# Patient Record
Sex: FEMALE | Race: WHITE | NOT HISPANIC OR LATINO | Employment: FULL TIME | URBAN - METROPOLITAN AREA
[De-identification: names, ages, dates, MRNs, and addresses within clinical notes are randomized per-mention and may not be internally consistent; named-entity substitution may affect disease eponyms.]

---

## 2018-05-16 ENCOUNTER — RECORDS - HEALTHEAST (OUTPATIENT)
Dept: LAB | Facility: CLINIC | Age: 28
End: 2018-05-16

## 2018-05-17 LAB — BACTERIA SPEC CULT: NO GROWTH

## 2019-03-26 ENCOUNTER — RECORDS - HEALTHEAST (OUTPATIENT)
Dept: ADMINISTRATIVE | Facility: OTHER | Age: 29
End: 2019-03-26

## 2019-06-25 ENCOUNTER — RECORDS - HEALTHEAST (OUTPATIENT)
Dept: LAB | Facility: CLINIC | Age: 29
End: 2019-06-25

## 2019-06-25 LAB
HCG SERPL QL: NEGATIVE
TSH SERPL DL<=0.005 MIU/L-ACNC: 1.8 UIU/ML (ref 0.3–5)

## 2019-09-30 ENCOUNTER — RECORDS - HEALTHEAST (OUTPATIENT)
Dept: LAB | Facility: CLINIC | Age: 29
End: 2019-09-30

## 2019-09-30 LAB
FSH SERPL-ACNC: 5.4 MIU/ML
HCG SERPL QL: NEGATIVE
LH SERPL-ACNC: 14.5 MIU/ML

## 2019-10-02 LAB — TESTOST SERPL-MCNC: 50 NG/DL (ref 14–53)

## 2021-05-29 ENCOUNTER — RECORDS - HEALTHEAST (OUTPATIENT)
Dept: ADMINISTRATIVE | Facility: CLINIC | Age: 31
End: 2021-05-29

## 2021-06-01 ENCOUNTER — RECORDS - HEALTHEAST (OUTPATIENT)
Dept: ADMINISTRATIVE | Facility: CLINIC | Age: 31
End: 2021-06-01

## 2021-06-12 ENCOUNTER — RECORDS - HEALTHEAST (OUTPATIENT)
Dept: LAB | Facility: CLINIC | Age: 31
End: 2021-06-12

## 2021-06-13 LAB — BACTERIA SPEC CULT: NORMAL

## 2021-07-15 ENCOUNTER — TRANSFERRED RECORDS (OUTPATIENT)
Dept: HEALTH INFORMATION MANAGEMENT | Facility: CLINIC | Age: 31
End: 2021-07-15

## 2021-08-11 ENCOUNTER — LAB REQUISITION (OUTPATIENT)
Dept: LAB | Facility: CLINIC | Age: 31
End: 2021-08-11

## 2021-08-11 DIAGNOSIS — R35.0 FREQUENCY OF MICTURITION: ICD-10-CM

## 2021-08-11 PROCEDURE — 87086 URINE CULTURE/COLONY COUNT: CPT | Performed by: FAMILY MEDICINE

## 2021-08-13 LAB — BACTERIA UR CULT: NO GROWTH

## 2021-08-23 ENCOUNTER — ANESTHESIA EVENT (OUTPATIENT)
Dept: OBGYN | Facility: CLINIC | Age: 31
End: 2021-08-23
Payer: COMMERCIAL

## 2021-08-23 ENCOUNTER — ANESTHESIA (OUTPATIENT)
Dept: OBGYN | Facility: CLINIC | Age: 31
End: 2021-08-23
Payer: COMMERCIAL

## 2021-08-23 ENCOUNTER — TRANSFERRED RECORDS (OUTPATIENT)
Dept: HEALTH INFORMATION MANAGEMENT | Facility: CLINIC | Age: 31
End: 2021-08-23

## 2021-08-23 ENCOUNTER — HOSPITAL ENCOUNTER (OUTPATIENT)
Facility: CLINIC | Age: 31
Discharge: HOME | End: 2021-08-24
Attending: OBSTETRICS & GYNECOLOGY | Admitting: OBSTETRICS & GYNECOLOGY
Payer: COMMERCIAL

## 2021-08-23 PROBLEM — Z36.89 ENCOUNTER FOR TRIAGE IN PREGNANT PATIENT: Status: ACTIVE | Noted: 2021-08-23

## 2021-08-23 LAB
ABO/RH(D): NORMAL
ALBUMIN UR-MCNC: NEGATIVE MG/DL
ANTIBODY SCREEN: NEGATIVE
APPEARANCE UR: CLEAR
BASOPHILS # BLD AUTO: 0 10E3/UL (ref 0–0.2)
BASOPHILS NFR BLD AUTO: 0 %
BILIRUB UR QL STRIP: NEGATIVE
CLUE CELLS: ABNORMAL
COLOR UR AUTO: ABNORMAL
EOSINOPHIL # BLD AUTO: 0.2 10E3/UL (ref 0–0.7)
EOSINOPHIL NFR BLD AUTO: 2 %
ERYTHROCYTE [DISTWIDTH] IN BLOOD BY AUTOMATED COUNT: 12.5 % (ref 10–15)
GLUCOSE UR STRIP-MCNC: NEGATIVE MG/DL
HCT VFR BLD AUTO: 35.3 % (ref 35–47)
HGB BLD-MCNC: 12.1 G/DL (ref 11.7–15.7)
HGB UR QL STRIP: NEGATIVE
IMM GRANULOCYTES # BLD: 0.1 10E3/UL
IMM GRANULOCYTES NFR BLD: 1 %
KETONES UR STRIP-MCNC: 10 MG/DL
LEUKOCYTE ESTERASE UR QL STRIP: NEGATIVE
LYMPHOCYTES # BLD AUTO: 2.3 10E3/UL (ref 0.8–5.3)
LYMPHOCYTES NFR BLD AUTO: 19 %
MCH RBC QN AUTO: 30.7 PG (ref 26.5–33)
MCHC RBC AUTO-ENTMCNC: 34.3 G/DL (ref 31.5–36.5)
MCV RBC AUTO: 90 FL (ref 78–100)
MONOCYTES # BLD AUTO: 0.8 10E3/UL (ref 0–1.3)
MONOCYTES NFR BLD AUTO: 7 %
NEUTROPHILS # BLD AUTO: 8.8 10E3/UL (ref 1.6–8.3)
NEUTROPHILS NFR BLD AUTO: 71 %
NITRATE UR QL: NEGATIVE
NRBC # BLD AUTO: 0 10E3/UL
NRBC BLD AUTO-RTO: 0 /100
PH UR STRIP: 6.5 [PH] (ref 5–7)
PLATELET # BLD AUTO: 203 10E3/UL (ref 150–450)
RBC # BLD AUTO: 3.94 10E6/UL (ref 3.8–5.2)
SARS-COV-2 RNA RESP QL NAA+PROBE: NEGATIVE
SP GR UR STRIP: 1.02 (ref 1–1.03)
SPECIMEN EXPIRATION DATE: NORMAL
TRICHOMONAS, WET PREP: ABNORMAL
UROBILINOGEN UR STRIP-MCNC: NORMAL MG/DL
WBC # BLD AUTO: 12.3 10E3/UL (ref 4–11)
WBC'S/HIGH POWER FIELD, WET PREP: ABNORMAL
YEAST, WET PREP: ABNORMAL

## 2021-08-23 PROCEDURE — 370N000017 HC ANESTHESIA TECHNICAL FEE, PER MIN: Performed by: OBSTETRICS & GYNECOLOGY

## 2021-08-23 PROCEDURE — 250N000011 HC RX IP 250 OP 636: Performed by: ANESTHESIOLOGY

## 2021-08-23 PROCEDURE — 710N000010 HC RECOVERY PHASE 1, LEVEL 2, PER MIN: Performed by: OBSTETRICS & GYNECOLOGY

## 2021-08-23 PROCEDURE — 250N000011 HC RX IP 250 OP 636: Performed by: NURSE ANESTHETIST, CERTIFIED REGISTERED

## 2021-08-23 PROCEDURE — 87491 CHLMYD TRACH DNA AMP PROBE: CPT | Performed by: STUDENT IN AN ORGANIZED HEALTH CARE EDUCATION/TRAINING PROGRAM

## 2021-08-23 PROCEDURE — 999N000010 HC STATISTIC ANES STAT CODE-CRNA PER MINUTE: Performed by: OBSTETRICS & GYNECOLOGY

## 2021-08-23 PROCEDURE — 85025 COMPLETE CBC W/AUTO DIFF WBC: CPT | Performed by: STUDENT IN AN ORGANIZED HEALTH CARE EDUCATION/TRAINING PROGRAM

## 2021-08-23 PROCEDURE — 36415 COLL VENOUS BLD VENIPUNCTURE: CPT | Performed by: STUDENT IN AN ORGANIZED HEALTH CARE EDUCATION/TRAINING PROGRAM

## 2021-08-23 PROCEDURE — 86900 BLOOD TYPING SEROLOGIC ABO: CPT | Performed by: STUDENT IN AN ORGANIZED HEALTH CARE EDUCATION/TRAINING PROGRAM

## 2021-08-23 PROCEDURE — 360N000074 HC SURGERY LEVEL 1, PER MIN: Performed by: OBSTETRICS & GYNECOLOGY

## 2021-08-23 PROCEDURE — 87591 N.GONORRHOEAE DNA AMP PROB: CPT | Performed by: STUDENT IN AN ORGANIZED HEALTH CARE EDUCATION/TRAINING PROGRAM

## 2021-08-23 PROCEDURE — 87210 SMEAR WET MOUNT SALINE/INK: CPT | Performed by: STUDENT IN AN ORGANIZED HEALTH CARE EDUCATION/TRAINING PROGRAM

## 2021-08-23 PROCEDURE — 250N000011 HC RX IP 250 OP 636: Performed by: STUDENT IN AN ORGANIZED HEALTH CARE EDUCATION/TRAINING PROGRAM

## 2021-08-23 PROCEDURE — 250N000013 HC RX MED GY IP 250 OP 250 PS 637: Performed by: STUDENT IN AN ORGANIZED HEALTH CARE EDUCATION/TRAINING PROGRAM

## 2021-08-23 PROCEDURE — 258N000003 HC RX IP 258 OP 636: Performed by: NURSE ANESTHETIST, CERTIFIED REGISTERED

## 2021-08-23 PROCEDURE — 81003 URINALYSIS AUTO W/O SCOPE: CPT | Performed by: STUDENT IN AN ORGANIZED HEALTH CARE EDUCATION/TRAINING PROGRAM

## 2021-08-23 PROCEDURE — 258N000003 HC RX IP 258 OP 636: Performed by: OBSTETRICS & GYNECOLOGY

## 2021-08-23 PROCEDURE — 99205 OFFICE O/P NEW HI 60 MIN: CPT | Mod: 25 | Performed by: OBSTETRICS & GYNECOLOGY

## 2021-08-23 PROCEDURE — 59320 REVISION OF CERVIX: CPT | Mod: GC | Performed by: STUDENT IN AN ORGANIZED HEALTH CARE EDUCATION/TRAINING PROGRAM

## 2021-08-23 PROCEDURE — 87635 SARS-COV-2 COVID-19 AMP PRB: CPT | Performed by: STUDENT IN AN ORGANIZED HEALTH CARE EDUCATION/TRAINING PROGRAM

## 2021-08-23 PROCEDURE — 272N000001 HC OR GENERAL SUPPLY STERILE: Performed by: OBSTETRICS & GYNECOLOGY

## 2021-08-23 PROCEDURE — 999N000141 HC STATISTIC PRE-PROCEDURE NURSING ASSESSMENT: Performed by: OBSTETRICS & GYNECOLOGY

## 2021-08-23 RX ORDER — ONDANSETRON 4 MG/1
4 TABLET, ORALLY DISINTEGRATING ORAL EVERY 30 MIN PRN
Status: CANCELLED | OUTPATIENT
Start: 2021-08-23

## 2021-08-23 RX ORDER — CLINDAMYCIN PHOSPHATE 600 MG/50ML
600 INJECTION, SOLUTION INTRAVENOUS EVERY 8 HOURS
Status: DISCONTINUED | OUTPATIENT
Start: 2021-08-23 | End: 2021-08-23 | Stop reason: HOSPADM

## 2021-08-23 RX ORDER — SERTRALINE HYDROCHLORIDE 25 MG/1
50 TABLET, FILM COATED ORAL DAILY
COMMUNITY
End: 2021-11-11

## 2021-08-23 RX ORDER — HYDROMORPHONE HCL IN WATER/PF 6 MG/30 ML
0.2 PATIENT CONTROLLED ANALGESIA SYRINGE INTRAVENOUS EVERY 5 MIN PRN
Status: CANCELLED | OUTPATIENT
Start: 2021-08-23

## 2021-08-23 RX ORDER — ONDANSETRON 4 MG/1
4 TABLET, ORALLY DISINTEGRATING ORAL
Status: DISCONTINUED | OUTPATIENT
Start: 2021-08-23 | End: 2021-08-24 | Stop reason: HOSPADM

## 2021-08-23 RX ORDER — PRENATAL VIT/IRON FUM/FOLIC AC 27MG-0.8MG
1 TABLET ORAL DAILY
COMMUNITY
End: 2022-01-07

## 2021-08-23 RX ORDER — LIDOCAINE 40 MG/G
CREAM TOPICAL
Status: DISCONTINUED | OUTPATIENT
Start: 2021-08-23 | End: 2021-08-24 | Stop reason: HOSPADM

## 2021-08-23 RX ORDER — SODIUM CHLORIDE, SODIUM LACTATE, POTASSIUM CHLORIDE, CALCIUM CHLORIDE 600; 310; 30; 20 MG/100ML; MG/100ML; MG/100ML; MG/100ML
INJECTION, SOLUTION INTRAVENOUS CONTINUOUS
Status: CANCELLED | OUTPATIENT
Start: 2021-08-23

## 2021-08-23 RX ORDER — FENTANYL CITRATE 50 UG/ML
25 INJECTION, SOLUTION INTRAMUSCULAR; INTRAVENOUS EVERY 5 MIN PRN
Status: CANCELLED | OUTPATIENT
Start: 2021-08-23

## 2021-08-23 RX ORDER — BUPIVACAINE HYDROCHLORIDE 7.5 MG/ML
INJECTION, SOLUTION INTRASPINAL
Status: DISCONTINUED | OUTPATIENT
Start: 2021-08-23 | End: 2021-08-23

## 2021-08-23 RX ORDER — SODIUM CHLORIDE, SODIUM LACTATE, POTASSIUM CHLORIDE, CALCIUM CHLORIDE 600; 310; 30; 20 MG/100ML; MG/100ML; MG/100ML; MG/100ML
INJECTION, SOLUTION INTRAVENOUS CONTINUOUS
Status: DISCONTINUED | OUTPATIENT
Start: 2021-08-23 | End: 2021-08-24 | Stop reason: HOSPADM

## 2021-08-23 RX ORDER — INDOMETHACIN 50 MG/1
50 CAPSULE ORAL EVERY 8 HOURS
Status: COMPLETED | OUTPATIENT
Start: 2021-08-23 | End: 2021-08-24

## 2021-08-23 RX ORDER — ACETAMINOPHEN 325 MG/1
650 TABLET ORAL
Status: DISCONTINUED | OUTPATIENT
Start: 2021-08-23 | End: 2021-08-24

## 2021-08-23 RX ORDER — DOCUSATE SODIUM 100 MG/1
100 CAPSULE, LIQUID FILLED ORAL 2 TIMES DAILY
Status: DISCONTINUED | OUTPATIENT
Start: 2021-08-23 | End: 2021-08-24 | Stop reason: HOSPADM

## 2021-08-23 RX ORDER — SODIUM CHLORIDE, SODIUM LACTATE, POTASSIUM CHLORIDE, CALCIUM CHLORIDE 600; 310; 30; 20 MG/100ML; MG/100ML; MG/100ML; MG/100ML
INJECTION, SOLUTION INTRAVENOUS CONTINUOUS PRN
Status: DISCONTINUED | OUTPATIENT
Start: 2021-08-23 | End: 2021-08-23

## 2021-08-23 RX ORDER — POLYETHYLENE GLYCOL 3350 17 G/17G
17 POWDER, FOR SOLUTION ORAL DAILY
Status: DISCONTINUED | OUTPATIENT
Start: 2021-08-24 | End: 2021-08-24 | Stop reason: HOSPADM

## 2021-08-23 RX ORDER — OXYCODONE HYDROCHLORIDE 5 MG/1
5 TABLET ORAL EVERY 4 HOURS PRN
Status: CANCELLED | OUTPATIENT
Start: 2021-08-23

## 2021-08-23 RX ORDER — ONDANSETRON 2 MG/ML
4 INJECTION INTRAMUSCULAR; INTRAVENOUS EVERY 30 MIN PRN
Status: CANCELLED | OUTPATIENT
Start: 2021-08-23

## 2021-08-23 RX ORDER — FAMOTIDINE 20 MG/1
20 TABLET, FILM COATED ORAL 2 TIMES DAILY
Status: DISCONTINUED | OUTPATIENT
Start: 2021-08-23 | End: 2021-08-24 | Stop reason: HOSPADM

## 2021-08-23 RX ADMIN — PHENYLEPHRINE HYDROCHLORIDE 100 MCG: 10 INJECTION INTRAVENOUS at 21:18

## 2021-08-23 RX ADMIN — CLINDAMYCIN IN 5 PERCENT DEXTROSE 600 MG: 12 INJECTION, SOLUTION INTRAVENOUS at 21:10

## 2021-08-23 RX ADMIN — INDOMETHACIN 50 MG: 25 CAPSULE ORAL at 22:19

## 2021-08-23 RX ADMIN — SERTRALINE HYDROCHLORIDE 50 MG: 50 TABLET ORAL at 22:54

## 2021-08-23 RX ADMIN — SODIUM CHLORIDE, POTASSIUM CHLORIDE, SODIUM LACTATE AND CALCIUM CHLORIDE: 600; 310; 30; 20 INJECTION, SOLUTION INTRAVENOUS at 23:44

## 2021-08-23 RX ADMIN — SODIUM CHLORIDE, POTASSIUM CHLORIDE, SODIUM LACTATE AND CALCIUM CHLORIDE: 600; 310; 30; 20 INJECTION, SOLUTION INTRAVENOUS at 20:57

## 2021-08-23 RX ADMIN — BUPIVACAINE HYDROCHLORIDE IN DEXTROSE 1.4 ML: 7.5 INJECTION, SOLUTION SUBARACHNOID at 21:04

## 2021-08-23 RX ADMIN — ACETAMINOPHEN 650 MG: 325 TABLET, FILM COATED ORAL at 22:53

## 2021-08-23 RX ADMIN — DOCUSATE SODIUM 100 MG: 100 CAPSULE, LIQUID FILLED ORAL at 22:54

## 2021-08-23 RX ADMIN — PHENYLEPHRINE HYDROCHLORIDE 100 MCG: 10 INJECTION INTRAVENOUS at 21:07

## 2021-08-23 RX ADMIN — FAMOTIDINE 20 MG: 20 TABLET ORAL at 22:19

## 2021-08-23 RX ADMIN — SODIUM CHLORIDE, POTASSIUM CHLORIDE, SODIUM LACTATE AND CALCIUM CHLORIDE: 600; 310; 30; 20 INJECTION, SOLUTION INTRAVENOUS at 20:36

## 2021-08-23 ASSESSMENT — MIFFLIN-ST. JEOR: SCORE: 1663.47

## 2021-08-23 NOTE — H&P
History and Physical     Yue Teran MRN# [id number]   YOB: 1990 Age: 30 year old      Date of Admission: 2021    Primary care provider: Braydon Ramos      Assessment and Plan:       30 year old  at 20w4d by 10w6d US with a history of MDD/PRAMOD here with cervical insufficiency.   Pregnancy has otherwise been complicated by UTI at 9w gestation.      # Cervical insufficiency  - Plan to proceed with exam indicated cerclage today  - Continuous tocometry   - Plan post-op antibiotics and tocolysis x 24 hours.     # FWB:   - Continuous toco - no evidence of contractions  - TID Dopp tones post-op      Patient discussed with Miladys Contreras MD.     Casandra Maddox MD  Obstetrics & Gynecology, PGY-3  2021 6:28 PM      Physician Attestation   I, Miladys Contreras MD, saw this patient with the resident and agree with the resident/fellow's findings and plan of care as documented in the note.      I personally reviewed vital signs, medications, labs, imaging and EFM.    Key findings: In summary, Yue Teran's overall clinical picture is concerning for cervical insufficiency. ?We did also review the other differential diagnoses, including possibility of previable labor/miscarriage, possibility of intrauterine infection, or that the cervical shortening is a risk factor for  delivery, that may not be ameliorated with cerclage placement. ?We had a long discussion regarding the risks, benefits and alternatives of continued expectant management vs proceeding with rescue cerclage placement. Yue opted to proceed with cerclage placement today. ?Informed consent was obtained and will plan to proceed with exam indicated cerclage today.     Miladys Contreras MD  Date of Service (when I saw the patient): 21    Time Spent on this Encounter   I spent 65 minutes on the unit/floor managing the care of Yue Teran. Over 50% of my time was spent on the following:   - Counseling the patient  and/or family regarding: diagnostic results, prognosis, risks and benefits of treatment options and medical compliance  - Coordination of care with the: nurse and patient    In summary, Yue Teran's overall clinical picture is concerning for cervical insufficiency. ?We did also review the other differential diagnoses, including possibility of previable labor/miscarriage, possibility of intrauterine infection, or that the cervical shortening is a risk factor for  delivery, that may not be ameliorated with cerclage placement. ?We had a long discussion regarding the risks, benefits and alternatives of continued expectant management vs proceeding with rescue cerclage placement. Yue opted to proceed with cerclage placement today. ?Informed consent was obtained and will plan to proceed with exam indicated cerclage today.     Miladys Contreras MD            HPI:     Yue Teran is a 30 year old  at 20w4d who presents today from her routine anatomy scan for new finding of cervical dilation. Yue states her pregnancy has been thus far uncomplicated.  However, at her US today, cervical funneling was noted and on exam, Yue was found to be 1-2 cm dilated, prompting her to be transferred here for further assessment.      Denies LOF, vaginal bleeding, or contractions.  Has noted increased discharge over the past week.  She denies fever, chills, SOB, chest pain, palpitations, N/V, LE swelling/tenderness.  No concerns for headache, vision changes, RUQ or epigastric pain        OB History:    OB History    Para Term  AB Living   1 0 0 0 0 0   SAB TAB Ectopic Multiple Live Births   0 0 0 0 0      # Outcome Date GA Lbr Andres/2nd Weight Sex Delivery Anes PTL Lv   1 Current                 Prenatal Lab Results:  No results found for: ABO, RH, AS, HEPBANG, CHPCRT, GCPCRT, TREPAB, RPR, RUBELLAABIGG, HGB, HIV    GBS Status:   No results found for: GBS             Past Medical History:   No past  "medical history on file.          Past Surgical History:   No past surgical history on file.          Social History:     Social History     Tobacco Use     Smoking status: Never Smoker     Smokeless tobacco: Never Used   Substance Use Topics     Alcohol use: Never             Family History:   No family history on file.          Immunizations:     There is no immunization history on file for this patient.         Allergies:     Allergies   Allergen Reactions     Egg [Egg Shells] Unknown     Amoxicillin Rash     Cefzil [Cefprozil] Rash             Medications:     Medications Prior to Admission   Medication Sig Dispense Refill Last Dose     Prenatal Vit-Fe Fumarate-FA (PRENATAL MULTIVITAMIN W/IRON) 27-0.8 MG tablet Take 1 tablet by mouth daily   8/22/2021 at 2100     sertraline (ZOLOFT) 25 MG tablet Take 25 mg by mouth daily   8/22/2021 at 2100             Review of Systems & Physical Exam:     The Review of Systems is negative other than noted in the HPI      /72   Pulse 70   Temp 98.6  F (37  C) (Oral)   Resp 18   Ht 1.702 m (5' 7\")   Wt 90.1 kg (198 lb 9.6 oz)   BMI 31.11 kg/m    Gen: Pt AAOx3, NAD  CV: RRR, nl S1/S2, no murmurs/clicks/gallops  Lungs: CTAB, non-labored breathing  Abd: Gravid, non-tender, non-distended  Ext: No peripheral extremity edema  SSE: Cx 1 cm dilated, membranes visible at external os, cervix ~1 cm in length.     Monitoring External  FHT: present  TOCO 0 contractions in 10 minutes         Data:     Component      Latest Ref Rng & Units 8/23/2021   WBC      4.0 - 11.0 10e3/uL 12.3 (H)   RBC Count      3.80 - 5.20 10e6/uL 3.94   Hemoglobin      11.7 - 15.7 g/dL 12.1   Hematocrit      35.0 - 47.0 % 35.3   MCV      78 - 100 fL 90   MCH      26.5 - 33.0 pg 30.7   MCHC      31.5 - 36.5 g/dL 34.3   RDW      10.0 - 15.0 % 12.5   Platelet Count      150 - 450 10e3/uL 203   % Neutrophils      % 71   % Lymphocytes      % 19   % Monocytes      % 7   % Eosinophils      % 2   % Basophils     "  % 0   % Immature Granulocytes      % 1   NRBCs per 100 WBC      <1 /100 0   Absolute Neutrophils      1.6 - 8.3 10e3/uL 8.8 (H)   Absolute Lymphocytes      0.8 - 5.3 10e3/uL 2.3   Absolute Monocytes      0.0 - 1.3 10e3/uL 0.8   Absolute Eosinophils      0.0 - 0.7 10e3/uL 0.2   Absolute Basophils      0.0 - 0.2 10e3/uL 0.0   Absolute Immature Granulocytes      <=0.0 10e3/uL 0.1 (H)   Absolute NRBCs      10e3/uL 0.0   Trichomonas      Absent Absent   Yeast      Absent Absent   Clue cells      Absent Absent   WBCs/high power field      None 1+ (A)   ABO/Rh(D)       B POS   Antibody Screen      Negative Negative   SPECIMEN EXPIRATION DATE       [id number]   SARS CoV2 PCR      Negative Negative

## 2021-08-24 ENCOUNTER — HOSPITAL ENCOUNTER (OUTPATIENT)
Facility: CLINIC | Age: 31
End: 2021-08-24
Admitting: OBSTETRICS & GYNECOLOGY
Payer: COMMERCIAL

## 2021-08-24 ENCOUNTER — APPOINTMENT (OUTPATIENT)
Dept: ULTRASOUND IMAGING | Facility: CLINIC | Age: 31
End: 2021-08-24
Payer: COMMERCIAL

## 2021-08-24 VITALS
RESPIRATION RATE: 18 BRPM | DIASTOLIC BLOOD PRESSURE: 65 MMHG | TEMPERATURE: 97.6 F | BODY MASS INDEX: 31.17 KG/M2 | WEIGHT: 198.6 LBS | OXYGEN SATURATION: 100 % | HEIGHT: 67 IN | SYSTOLIC BLOOD PRESSURE: 109 MMHG | HEART RATE: 56 BPM

## 2021-08-24 DIAGNOSIS — O34.32 CERVICAL INSUFFICIENCY DURING PREGNANCY IN SECOND TRIMESTER, ANTEPARTUM: Primary | ICD-10-CM

## 2021-08-24 LAB
C TRACH DNA SPEC QL NAA+PROBE: NEGATIVE
HOLD SPECIMEN: NORMAL
HOLD SPECIMEN: NORMAL
N GONORRHOEA DNA SPEC QL NAA+PROBE: NEGATIVE

## 2021-08-24 PROCEDURE — 99214 OFFICE O/P EST MOD 30 MIN: CPT | Mod: 25 | Performed by: OBSTETRICS & GYNECOLOGY

## 2021-08-24 PROCEDURE — 250N000011 HC RX IP 250 OP 636: Performed by: STUDENT IN AN ORGANIZED HEALTH CARE EDUCATION/TRAINING PROGRAM

## 2021-08-24 PROCEDURE — 76815 OB US LIMITED FETUS(S): CPT | Mod: 26 | Performed by: OBSTETRICS & GYNECOLOGY

## 2021-08-24 PROCEDURE — 250N000013 HC RX MED GY IP 250 OP 250 PS 637: Performed by: STUDENT IN AN ORGANIZED HEALTH CARE EDUCATION/TRAINING PROGRAM

## 2021-08-24 PROCEDURE — 76817 TRANSVAGINAL US OBSTETRIC: CPT | Mod: 26 | Performed by: OBSTETRICS & GYNECOLOGY

## 2021-08-24 PROCEDURE — 76815 OB US LIMITED FETUS(S): CPT

## 2021-08-24 RX ORDER — ONDANSETRON 2 MG/ML
4 INJECTION INTRAMUSCULAR; INTRAVENOUS EVERY 6 HOURS PRN
Status: DISCONTINUED | OUTPATIENT
Start: 2021-08-24 | End: 2021-08-24 | Stop reason: HOSPADM

## 2021-08-24 RX ORDER — ACETAMINOPHEN 325 MG/1
650 TABLET ORAL EVERY 6 HOURS PRN
Status: DISCONTINUED | OUTPATIENT
Start: 2021-08-24 | End: 2021-08-24 | Stop reason: HOSPADM

## 2021-08-24 RX ORDER — CLINDAMYCIN PHOSPHATE 600 MG/50ML
600 INJECTION, SOLUTION INTRAVENOUS EVERY 8 HOURS
Status: COMPLETED | OUTPATIENT
Start: 2021-08-24 | End: 2021-08-24

## 2021-08-24 RX ADMIN — INDOMETHACIN 50 MG: 25 CAPSULE ORAL at 14:04

## 2021-08-24 RX ADMIN — CLINDAMYCIN PHOSPHATE 600 MG: 600 INJECTION, SOLUTION INTRAVENOUS at 04:48

## 2021-08-24 RX ADMIN — CLINDAMYCIN PHOSPHATE 600 MG: 600 INJECTION, SOLUTION INTRAVENOUS at 13:18

## 2021-08-24 RX ADMIN — ONDANSETRON 4 MG: 2 INJECTION INTRAMUSCULAR; INTRAVENOUS at 01:31

## 2021-08-24 RX ADMIN — ACETAMINOPHEN 650 MG: 325 TABLET, FILM COATED ORAL at 04:47

## 2021-08-24 RX ADMIN — DOCUSATE SODIUM 100 MG: 100 CAPSULE, LIQUID FILLED ORAL at 07:49

## 2021-08-24 RX ADMIN — FAMOTIDINE 20 MG: 20 TABLET ORAL at 07:48

## 2021-08-24 RX ADMIN — INDOMETHACIN 50 MG: 25 CAPSULE ORAL at 06:01

## 2021-08-24 NOTE — PLAN OF CARE
[facility] for a cerclage evaluation. No contractions noted on toco, patient denies cramping or uterine tenderness. VSS. Patient states that she has had increased discharge in the last week but denies any other symptoms. Dr. Contreras at bedside now for evaluation. Report given to KLEBER RN who assumes care of this patient at this time.

## 2021-08-24 NOTE — OP NOTE
Operative Note: Cervical Cerclage         Pre-Op Diagnosis:   1) Single intrauterine pregnancy at 20w4d by 10w6d US  2) Cervical insufficiency by physical exam indication         Post-Op Diagnosis:   1) Same         Procedure:   1) Olivas cervical cerclage, two sutures (knots at 12 and 1 o'clock positions)         Surgeons:   Attending: Miladys Contreras MD  Fellow: Ubaldo Mcgowan MD, [facility] Fellow  Resident: Casandra Maddox MD PGY3         Anesthesia:   Spinal          Estimated Blood Loss:   10 cc         Findings:   1) Cervix dilated 4 cm with exposed membranes and fetal parts visualized through amniotic sac prior to the procedure. Cervix closed following the procedure  2) Fetal heart tones confirmed by bedside ultrasound following the procedure         Specimens:   1) None         Complications:   1) None apparent          History:     Yue Teran is a 30 year old  at 20w4d by 10w6d US.  She presented for her comprehensive anatomy ultrasound and was incidentally found to have cervical funneling. She underwent speculum examination, which revealed that the cervix was dilated 1-2 cm. Given this and the concern for cervical insufficiency, she presented to L&D for evaluation for cervical cerclage. She did not display any evidence of  labor or intra-amniotic infection on presentation.  After counseling regarding these findings, the patient has opted to proceed with physical exam indicated cervical cerclage.         Details of Procedure:     After administration of spinal anesthesia the patient was placed in the dorsal lithotomy position and prepped and draped in the usual fashion. A surgical time-out was performed. The bladder was straight catheterized.  A weighted speculum was placed into the vagina and zoë retractors were used to visualize the cervix. The cervix was visually dilated 4 cm and the amnionitic sac was visualized at the level of the external os. Fetal parts were visualized through the  amniotic sac. The vagina and cervix were copiously cleansed with betadine solution. Next, a circumferential stay suture was placed at the external cervix with a #2 Ethilon. Next, a circumferential suture of #2 Ethilon was placed in the usual fashion at the cervicovaginal reflection with knot tied at 1 o'clock position after ensuring that all fetal parts had been displaced from the cervix as evidenced by palpation.  A second suture of #2 Ethilon was placed approximately 0.5 cm proximal to the first stitch and tied at the 12 o'clock position.  Both sutures were securely tied down and digital exam confirmed that the cervix was closed.  The stay suture was then removed, the cervix was examined and found to be tightly closed.     The bladder was drained of clear urine and a rectal exam confirmed that no sutures were present in the rectum.  The cervix and vaginal vault were inspected and noted to be free of injury and hemostatic.      The instruments were removed from the vagina. She tolerated her spinal anesthesia well without incident. She was subsequently transferred to the recovery room in satisfactory condition.     Sponge and needle counts were correct at the close of the case x 2.    Ubaldo Mcgowan MD  Maternal-Fetal Medicine Fellow    Physician Attestation   I was present for the entire procedure of exam indicated cerclage placement.    Miladys Contreras  Date of Service (when I saw the patient): 8/23/21

## 2021-08-24 NOTE — PROGRESS NOTES
VSS. Tolerating PO, voiding adequate amounts without difficulty. Reports occasional cramping, declines need for tylenol. OK for discharge. Discharge instructions given, patient verbalizes understanding. Discharge ambulatory.

## 2021-08-24 NOTE — PLAN OF CARE
VSS. Mild abdominal cramping and arching relieved with tylenol and heat pack. IA baseline 145, no ctx on toco since 2300. Adequate urine output. Ambulating independently. Tolerating fluids. Nausea and vomiting x 1, relieved with Zofran. Scant bleeding overnight. Will continue to monitor closely.

## 2021-08-24 NOTE — PLAN OF CARE
MD deems patient a candidate for cerclage placement after speculum exam was performed.  Swabs sent.  Consents signed and patient prepped for procedure.

## 2021-08-24 NOTE — ANESTHESIA POSTPROCEDURE EVALUATION
Patient: Yue Teran    Procedure(s):  CERCLAGE, CERVIX, VAGINAL APPROACH    Diagnosis:* No pre-op diagnosis entered *  Diagnosis Additional Information: No value filed.    Anesthesia Type:  Spinal    Note:  Disposition: Inpatient   Postop Pain Control: Uneventful            Sign Out: Well controlled pain   PONV: No   Neuro/Psych: Uneventful            Sign Out: Acceptable/Baseline neuro status   Airway/Respiratory: Uneventful            Sign Out: Acceptable/Baseline resp. status   CV/Hemodynamics: Uneventful            Sign Out: Acceptable CV status; No obvious hypovolemia; No obvious fluid overload   Other NRE: NONE   DID A NON-ROUTINE EVENT OCCUR? No           Last vitals:  Vitals Value Taken Time   /81 08/23/21 2230   Temp 36.4  C (97.6  F) 08/23/21 2200   Pulse 53 08/23/21 2240   Resp 18 08/23/21 2200   SpO2 100 % 08/23/21 2240   Vitals shown include unvalidated device data.    Electronically Signed By: Debby Sullivan MD  August 23, 2021  10:40 PM

## 2021-08-24 NOTE — DISCHARGE SUMMARY
[facility] Discharge Summary    Yue Teran MRN# [id number]   Age: 30 year old YOB: 1990     Date of Admission:  2021  Date of Discharge:  2021   Admitting Physician:  Miladys Contreras MD  Discharge Physician:  Miladys Contreras MD     Admission Diagnosis:  Single intrauterine pregnancy at 20w4d by 10w6d US  Cervical insufficiency by physical exam indication  Depression/anxiety     Discharge Diagnosis:  Same, s/p Olivas cerclage     Procedures:    Olivas cervical cerclage, two sutures (knots at 12 and 1 o'clock positions)    Consultations:    Anesthesia     Medications prior to admission:  PNV  Sertraline 50 mg     Brief History of Presentation:    Yue Teran is a 30 year old  at 20w4d by 10w6d US.  She presented for her comprehensive anatomy ultrasound and was incidentally found to have cervical funneling. She underwent speculum examination, which revealed that the cervix was dilated 1-2 cm. Given this and the concern for cervical insufficiency, she presented to L&D for evaluation for cervical cerclage. She did not display any evidence of  labor or intra-amniotic infection on presentation.  After counseling regarding these findings, the patient has opted to proceed with physical exam indicated cervical cerclage.    Hospital Course:    The patient was admitted for overnight monitoring and prophylactic tocolysis and antibiotics. She remained stable overnight without any regular contractions, LOF or columba vaginal bleeding. On HD#2 her pain was well controlled, she was voiding spontaneously, she completed her 24 hour course of postoperative prophylaxis and was deemed stable for discharge to home.     Discharge Instructions:  Call or present to labor and delivery if you experience:   -Regular painful contractions concerning for labor   -Leakage of fluid concerning for ruptured membranes   -Decreased fetal movement   -Bright red vaginal bleeding     -Headache, vision changes, upper abdominal pain, significant increase in swelling,   generalized unwell feeling    Follow up:  Follow up in [facility] in 1 week for US assessment.  Continue OB care with [facility].       Discharge Medications:  Current Discharge Medication List      CONTINUE these medications which have NOT CHANGED    Details   Prenatal Vit-Fe Fumarate-FA (PRENATAL MULTIVITAMIN W/IRON) 27-0.8 MG tablet Take 1 tablet by mouth daily      sertraline (ZOLOFT) 25 MG tablet Take 25 mg by mouth daily             Jodi Gutiérrez MD  OB/GYN PGY-4  08/24/21 2:41 PM     Physician Attestation   I, Miladys Contreras, saw and evaluated this patient prior to discharge.  I discussed the patient with the resident/fellow and agree with plan of care as documented in the note.      I personally reviewed vital signs, medications and imaging.    I personally spent 30 minutes on discharge activities.    Miladys Contreras MD  Date of Service (when I saw the patient): 08/24/21

## 2021-08-24 NOTE — PROGRESS NOTES
MFM Antepartum Progress Note    Subjective:   Ms. Teran is feeling well this morning. She denies any contractions, vaginal bleeding or cramping overnight. Did have one episode of VB right after she first stood up to use commode. Also had an episode of wetness where she thinkgs she was incontinent of urine right after procedure when bladder felt full. She has not had any ongoing LOF and no episodes of incontinence since. Denies n/v, chest pain, chills or fevers.     Objective:  Vitals:    21 0000 21 0222 21 0444 21 0600   BP: 135/77 115/53 120/59 109/65   Pulse:       Resp:   15 18   Temp:       TempSrc:       SpO2: 99% 97% 99% 100%   Weight:       Height:           I/O last 3 completed shifts:  In: 0 [P.O.:500; I.V.:1490; IV Piggyback:50]  Out: 1310 [Urine:1000; Emesis/NG output:300; Blood:10]    Gen: Resting comfortably in bed, NAD  CV: RRR, no murmurs  Resp: non labored breathing on room air   Abd: Gravid, non-tender, non-distended  Ext: non-tender, no edema    SSE: deferred - yesterday with cerclage placement visualized with 4cm     US: Cervix closed with cervical length 2.2 cm.  AF MVP 5 cm.    Fetal monitoring:   FHT: doptones appropriate   [place]: quiet, 1-2 contractions overnight     Assessment/Plan:   Yue Teran is a 30 year old  at 20w5d by 10w6d US with a history of MDD/PRAMOD here with cervical insufficiency.   Pregnancy has otherwise been complicated by UTI at 9w gestation.       # Cervical insufficiency  - S/p cerclage placement, SSE 4cm at the time of placement. Cerclage successfully placed   - Continuous tocometry   - Continue post-op antibiotics and tocolysis x 24 hours. As long as toco remains quiet and patient has no contractions/cramping and patient remains stable, she will be appropriate for discharge this afternoon.      # FWB:   - Continuous toco - no evidence of contractions  - TID Dopp tones appropriate  - MFM US performed today and was reassuring. OSCAR within  normal limits. See above      Patient seen and discussed with Miladys Contreras MD.      Page Silva MD, [facility]  OBGYN PGY-3  21 8:55 AM     Physician Attestation   I, Miladys Contreras MD, saw this patient with the resident and agree with the resident/fellow's findings and plan of care as documented in the note.      I personally reviewed vital signs, medications, labs, imaging and EFM.    Key findings: In summary, Yue Teran is a  at 20w5d admitted with cervical insufficiency s/p exam indicated cerclage POD #1.  Both maternal and fetal status are stable and reassuring with no apparent post-operative complication.  As long as Wilder post-op course continues to be uncomplicated, will plan discharge home today.  As she remains high risk for PTB, will recommend close interval surveillance with primary OB as well.  We will see her within 1 week for repeat TV US as well given advanced dilation at time of cerclage placement.      Miladys Contreras MD  Date of Service (when I saw the patient): 21    Time Spent on this Encounter   I spent 30 minutes on the unit/floor managing the care of Yue Teran. Over 50% of my time was spent on the following:   - Counseling the patient and/or family regarding: diagnostic results, prognosis and recommended follow-up  - Coordination of care with the: nurse and patient     In summary, Yue Teran is a  at 20w5d admitted with cervical insufficiency s/p exam indicated cerclage POD #1.  Both maternal and fetal status are stable and reassuring with no apparent post-operative complication.  As long as Wilder post-op course continues to be uncomplicated, will plan discharge home today.  As she remains high risk for PTB, will recommend close interval surveillance with primary OB as well.  We will see her within 1 week for repeat TV US as well given advanced dilation at time of cerclage placement.      Miladys Contreras MD

## 2021-08-24 NOTE — ANESTHESIA CARE TRANSFER NOTE
Patient: Yue Teran    Procedure(s):  CERCLAGE, CERVIX, VAGINAL APPROACH    Diagnosis: * No pre-op diagnosis entered *  Diagnosis Additional Information: No value filed.    Anesthesia Type:   Spinal     Note:    Oropharynx: spontaneously breathing  Level of Consciousness: awake  Oxygen Supplementation: room air    Independent Airway: airway patency satisfactory and stable  Dentition: dentition unchanged  Vital Signs Stable: post-procedure vital signs reviewed and stable  Report to RN Given: handoff report given  Patient transferred to: PACU  Comments: T 97.6.    Handoff Report: Identifed the Patient, Identified the Reponsible Provider, Reviewed the pertinent medical history, Discussed the surgical course, Reviewed Intra-OP anesthesia mangement and issues during anesthesia, Set expectations for post-procedure period and Allowed opportunity for questions and acknowledgement of understanding      Vitals:  Vitals Value Taken Time   BP     Temp     Pulse     Resp     SpO2         Electronically Signed By: KUSHAL Lance CRNA  August 23, 2021  9:54 PM

## 2021-08-24 NOTE — DISCHARGE INSTRUCTIONS
Discharge Instruction for Undelivered Patients      You were seen for: cervical insufficiency   We Consulted: Dr. Contreras  You had (Test or Medicine): Gonsales cerclage     Diet:   Drink 8 to 12 glasses of liquids (milk, juice, water) every day.  You may eat meals and snacks.     Activity:  Call your doctor or nurse midwife if your baby is moving less than usual.     Call your provider if you notice:  Swelling in your face or increased swelling in your hands or legs.  Headaches that are not relieved by Tylenol (acetaminophen).  Changes in your vision (blurring: seeing spots or stars.)  Nausea (sick to your stomach) and vomiting (throwing up).   Weight gain of 5 pounds or more per week.  Heartburn that doesn't go away.  Signs of bladder infection: pain when you urinate (use the toilet), need to go more often and more urgently.  The bag of benjamin (rupture of membranes) breaks, or you notice leaking in your underwear.  Bright red blood in your underwear.  Abdominal (lower belly) or stomach pain.  For first baby: Contractions (tightening) less than 5 minutes apart for one hour or more.  Second (plus) baby: Contractions (tightening) less than 10 minutes apart and getting stronger.  *If less than 34 weeks: Contractions (tightening) more than 6 times in one hour.  Increase or change in vaginal discharge (note the color and amount)    Follow-up:  Maternal Fetal Medicine clinic will call you to schedule follow up appointment.         Recovering at home  Tips for home recovery include:    You may be prescribed medicine to take at home. This may be medicine to relieve pain. It may also be medicine to prevent labor. Take all medicines as prescribed.    Take it easy for 2 to 3 days after the procedure. Plan to have others help you as needed. Unless you are instructed to do so, you don't need to stay in bed.    Don't have sex for at least 7 days after the procedure.    Ask your healthcare provider when you can return to work and  exercise.  Follow-up care  During your follow-up visit, your healthcare provider will check your healing. You can also discuss how your pregnancy is progressing. You'll be told when to schedule an appointment to have the stitch removed.   When to call the healthcare provider  Call your healthcare provider if you notice any of the following:    A fever of 100.4 F (38 C) or higher, or as advised    Pain that doesn't go away even after taking pain medicine    Contractions or stomach cramping    Fluid leaking from the vagina    Bleeding or spotting of blood from the vagina    Bad-smelling drainage from the vagina    Back or stomach pain  Laura last reviewed this educational content on 3/1/2020    9502-5236 [employer]. All rights reserved. This information is not intended as a substitute for professional medical care. Always follow your healthcare professional's instructions.

## 2021-08-24 NOTE — PROVIDER NOTIFICATION
08/24/21 0222   Provider Notification   Provider Name/Title Dr Maddox   Method of Notification At Bedside   Request Evaluate in Person   Notification Reason Maternal Vital Sign Change   pt felt dizzy while sitting on the toilet. Helped back in bed and checked VS. /53, O2 sat >94%, Pulse intermittent drops to high 40s. Pt denies feeling dizzy in bed. She is resting now.  Edit @0230- HR now in 50s, O2 sats >99%.

## 2021-08-24 NOTE — ANESTHESIA PREPROCEDURE EVALUATION
Anesthesia Pre-Procedure Evaluation    Patient: Yue Teran   MRN: [id number] : 1990        Preoperative Diagnosis: * No pre-op diagnosis entered *   Procedure : Procedure(s):  CERCLAGE, CERVIX, VAGINAL APPROACH     No past medical history on file.   No past surgical history on file.   Allergies   Allergen Reactions     Egg [Egg Shells] Unknown     Amoxicillin Rash     Cefzil [Cefprozil] Rash      Social History     Tobacco Use     Smoking status: Never Smoker     Smokeless tobacco: Never Used   Substance Use Topics     Alcohol use: Never      Wt Readings from Last 1 Encounters:   21 90.1 kg (198 lb 9.6 oz)              OUTSIDE LABS:  CBC:   Lab Results   Component Value Date    WBC 12.3 (H) 2021    HGB 12.1 2021    HCT 35.3 2021     2021     BMP: No results found for: NA, POTASSIUM, CHLORIDE, CO2, BUN, CR, GLC  COAGS: No results found for: PTT, INR, FIBR  POC:   Lab Results   Component Value Date    HCGS Negative 2019     HEPATIC: No results found for: ALBUMIN, PROTTOTAL, ALT, AST, GGT, ALKPHOS, BILITOTAL, BILIDIRECT, BAMBI  OTHER:   Lab Results   Component Value Date    TSH 1.80 2019       Anesthesia Plan    ASA Status:  2      Anesthesia Type: Spinal.              Consents    Anesthesia Plan(s) and associated risks, benefits, and realistic alternatives discussed. Questions answered and patient/representative(s) expressed understanding.     - Discussed with:  Patient         Postoperative Care            Comments:    Rescue cerclage             Debby Sullivan MD

## 2021-08-24 NOTE — ANESTHESIA PROCEDURE NOTES
Intrathecal injection Procedure Note  Pre-Procedure   Staff -        Anesthesiologist:  Debby Sullivan MD       Performed By: anesthesiologist       Location: OR       Pre-Anesthestic Checklist: patient identified, IV checked, risks and benefits discussed, informed consent, monitors and equipment checked, pre-op evaluation, at physician/surgeon's request and post-op pain management  Timeout:       Correct Patient: Yes        Correct Procedure: Yes        Correct Site: Yes        Correct Position: Yes   Procedure Documentation  Procedure: intrathecal injection       Patient Position: sitting       Skin prep: Chloraprep       Insertion Site: L3-4. (midline approach).       Needle Gauge: 25.        Needle Length (Inches): 3.5        Spinal Needle Type: Pencan       Introducer used       Introducer: 20 G       # of attempts: 1 and  # of redirects:  0    Assessment/Narrative         Paresthesias: No.       CSF fluid: clear.    Medication(s) Administered   0.75% Hyperbaric Bupivacaine (Intrathecal), 1.4 mL  Medication Administration Time: 8/23/2021 9:04 PM           adequate brachial artery and basilic vein for graft use

## 2021-08-26 ENCOUNTER — PRE VISIT (OUTPATIENT)
Dept: MATERNAL FETAL MEDICINE | Facility: CLINIC | Age: 31
End: 2021-08-26

## 2021-08-28 ENCOUNTER — HOSPITAL ENCOUNTER (OUTPATIENT)
Facility: CLINIC | Age: 31
Discharge: HOME | End: 2021-08-29
Attending: OBSTETRICS & GYNECOLOGY | Admitting: OBSTETRICS & GYNECOLOGY
Payer: COMMERCIAL

## 2021-08-28 VITALS
RESPIRATION RATE: 18 BRPM | BODY MASS INDEX: 31.08 KG/M2 | DIASTOLIC BLOOD PRESSURE: 68 MMHG | HEART RATE: 75 BPM | SYSTOLIC BLOOD PRESSURE: 118 MMHG | TEMPERATURE: 98.1 F | HEIGHT: 67 IN | WEIGHT: 198 LBS

## 2021-08-28 LAB
ALBUMIN UR-MCNC: NEGATIVE MG/DL
APPEARANCE UR: ABNORMAL
BACTERIA #/AREA URNS HPF: ABNORMAL /HPF
BILIRUB UR QL STRIP: NEGATIVE
CLUE CELLS: ABNORMAL
COLOR UR AUTO: ABNORMAL
GLUCOSE UR STRIP-MCNC: NEGATIVE MG/DL
HGB UR QL STRIP: ABNORMAL
KETONES UR STRIP-MCNC: NEGATIVE MG/DL
LEUKOCYTE ESTERASE UR QL STRIP: ABNORMAL
MUCOUS THREADS #/AREA URNS LPF: PRESENT /LPF
NITRATE UR QL: NEGATIVE
PH UR STRIP: 6.5 [PH] (ref 5–7)
RBC URINE: 1 /HPF
SP GR UR STRIP: 1.02 (ref 1–1.03)
SQUAMOUS EPITHELIAL: 9 /HPF
TRICHOMONAS, WET PREP: ABNORMAL
UROBILINOGEN UR STRIP-MCNC: NORMAL MG/DL
WBC URINE: 5 /HPF
WBC'S/HIGH POWER FIELD, WET PREP: ABNORMAL
YEAST, WET PREP: ABNORMAL

## 2021-08-28 PROCEDURE — 81001 URINALYSIS AUTO W/SCOPE: CPT | Performed by: STUDENT IN AN ORGANIZED HEALTH CARE EDUCATION/TRAINING PROGRAM

## 2021-08-28 PROCEDURE — G0463 HOSPITAL OUTPT CLINIC VISIT: HCPCS

## 2021-08-28 PROCEDURE — 87210 SMEAR WET MOUNT SALINE/INK: CPT | Performed by: STUDENT IN AN ORGANIZED HEALTH CARE EDUCATION/TRAINING PROGRAM

## 2021-08-28 PROCEDURE — 85025 COMPLETE CBC W/AUTO DIFF WBC: CPT | Performed by: STUDENT IN AN ORGANIZED HEALTH CARE EDUCATION/TRAINING PROGRAM

## 2021-08-28 PROCEDURE — 87086 URINE CULTURE/COLONY COUNT: CPT | Performed by: STUDENT IN AN ORGANIZED HEALTH CARE EDUCATION/TRAINING PROGRAM

## 2021-08-28 PROCEDURE — 36415 COLL VENOUS BLD VENIPUNCTURE: CPT | Performed by: STUDENT IN AN ORGANIZED HEALTH CARE EDUCATION/TRAINING PROGRAM

## 2021-08-28 PROCEDURE — 87491 CHLMYD TRACH DNA AMP PROBE: CPT | Performed by: STUDENT IN AN ORGANIZED HEALTH CARE EDUCATION/TRAINING PROGRAM

## 2021-08-28 RX ORDER — LIDOCAINE 40 MG/G
CREAM TOPICAL
Status: DISCONTINUED | OUTPATIENT
Start: 2021-08-28 | End: 2021-08-29 | Stop reason: HOSPADM

## 2021-08-28 RX ORDER — DOCUSATE SODIUM 100 MG/1
100 CAPSULE, LIQUID FILLED ORAL DAILY
COMMUNITY
End: 2022-01-07

## 2021-08-28 ASSESSMENT — MIFFLIN-ST. JEOR: SCORE: 1660.75

## 2021-08-29 LAB
BASOPHILS # BLD AUTO: 0.1 10E3/UL (ref 0–0.2)
BASOPHILS NFR BLD AUTO: 0 %
C TRACH DNA SPEC QL PROBE+SIG AMP: NEGATIVE
EOSINOPHIL # BLD AUTO: 0.2 10E3/UL (ref 0–0.7)
EOSINOPHIL NFR BLD AUTO: 2 %
ERYTHROCYTE [DISTWIDTH] IN BLOOD BY AUTOMATED COUNT: 12.2 % (ref 10–15)
HCT VFR BLD AUTO: 33 % (ref 35–47)
HGB BLD-MCNC: 11.4 G/DL (ref 11.7–15.7)
IMM GRANULOCYTES # BLD: 0.1 10E3/UL
IMM GRANULOCYTES NFR BLD: 1 %
LYMPHOCYTES # BLD AUTO: 2.8 10E3/UL (ref 0.8–5.3)
LYMPHOCYTES NFR BLD AUTO: 23 %
MCH RBC QN AUTO: 30.7 PG (ref 26.5–33)
MCHC RBC AUTO-ENTMCNC: 34.5 G/DL (ref 31.5–36.5)
MCV RBC AUTO: 89 FL (ref 78–100)
MONOCYTES # BLD AUTO: 1 10E3/UL (ref 0–1.3)
MONOCYTES NFR BLD AUTO: 8 %
N GONORRHOEA DNA SPEC QL NAA+PROBE: NEGATIVE
NEUTROPHILS # BLD AUTO: 8.1 10E3/UL (ref 1.6–8.3)
NEUTROPHILS NFR BLD AUTO: 66 %
NRBC # BLD AUTO: 0 10E3/UL
NRBC BLD AUTO-RTO: 0 /100
PLATELET # BLD AUTO: 204 10E3/UL (ref 150–450)
RBC # BLD AUTO: 3.71 10E6/UL (ref 3.8–5.2)
WBC # BLD AUTO: 12.3 10E3/UL (ref 4–11)

## 2021-08-29 PROCEDURE — G0463 HOSPITAL OUTPT CLINIC VISIT: HCPCS

## 2021-08-29 NOTE — PROVIDER NOTIFICATION
At bedside to do OSCAR check, unchanged from prior value. OK with lab results and quiet TOCO. Plan to discharge pt to home. Pt agreeable with plan. Verbal order accepted for discharge order.

## 2021-08-29 NOTE — DISCHARGE INSTRUCTIONS
Discharge Instruction for Undelivered Patients      You were seen for: Membrane Assessment  We Consulted: Dr Silva and Dr Contreras  You had (Test or Medicine): Speculum Exam and Labwork     Diet:   Drink 8 to 12 glasses of liquids (milk, juice, water) every day.     Activity:  Rest the pelvic area. No sex. Do not stimulate breasts or nipples.     Call your provider if you notice:  Swelling in your face or increased swelling in your hands or legs.  Headaches that are not relieved by Tylenol (acetaminophen).  Changes in your vision (blurring: seeing spots or stars.)  Nausea (sick to your stomach) and vomiting (throwing up).   Weight gain of 5 pounds or more per week.  Heartburn that doesn't go away.  Signs of bladder infection: pain when you urinate (use the toilet), need to go more often and more urgently.  The bag of benjamin (rupture of membranes) breaks, or you notice leaking in your underwear.  Bright red blood in your underwear.  Abdominal (lower belly) or stomach pain.  For first baby: Contractions (tightening) less than 5 minutes apart for one hour or more.  *If less than 34 weeks: Contractions (tightening) more than 6 times in one hour.  Increase or change in vaginal discharge (note the color and amount)    Follow-up:  As scheduled in the clinic

## 2021-08-29 NOTE — PROVIDER NOTIFICATION
Discussed plan with provider and Dr. Contreras on phone. Will await lab results. Noted 1 contraction on monitor with some irritability, pt stated felt intermittent cramping during day and did feel the noted contraction matched to monitor. Plan to continue to observe to see if contractions continue or mannie.

## 2021-08-29 NOTE — PROGRESS NOTES
Data: [facility] .   Reason for maternal/fetal assessment per patient is Rule out rupture of membranes  . Patient is a . Prenatal record reviewed.      OB History    Para Term  AB Living   1 0 0 0 0 0   SAB TAB Ectopic Multiple Live Births   0 0 0 0 0      # Outcome Date GA Lbr Andres/2nd Weight Sex Delivery Anes PTL Lv   1 Current               Medical History: History reviewed. No pertinent past medical history.. Gestational Age 21w3d. VSS. Cervix: closed.  Fetal movement present. Patient denies backache, pelvic pressure, UTI symptoms, GI problems, bloody show, vaginal bleeding, edema, headache, visual disturbances, epigastric or URQ pain, abdominal pain. Pt states a few episodes of cramping today, coupled with 4 episodes of increased discharge. Pt unsure if ROM. States color of discharge x2 is similar to betadine, otherwise clear. Support person present.  Action: Verbal consent for EFM. Triage assessment completed. Doptones completed, WNL. Uterine assessment done via TOCO, noted some irritability with 1 notable contraction. Patient education pamphlets given on fetal movement counts and when to call provider. Patient instructed to report change in fetal movement, vaginal leaking of fluid or bleeding, abdominal pain, or any concerns related to the pregnancy to her nurse/physician.   Response: Dr. Silva and Dr Contreras aware of pt. Labwork WNL and TOCO quiet. Plan per provider is discharge pt to home. Patient verbalized understanding of education and verbalized agreement with plan. Discharged ambulatory at 0130.

## 2021-08-29 NOTE — PROGRESS NOTES
"L&D Triage Progress Note     HPI: Yue Teran is a 30 year old  at 21w2d by 10w6d US, here for rule out previable PPROM. She notes over the course of the day she has had 4 discrete episodes of \"betadine colored\" discharge and some clear fluid. No ongoing leaking but did note a small amount of discharge on underwear. Did not really have contractions today. Did note one in triage which was seen on the monitor. No ongoing leaking, no urinary pain.      Pregnancy notable for:  Cervical insufficiency by physical exam indication  Depression/anxiety   Olivas cervical cerclage, two sutures (knots at 12 and 1 o'clock positions), placed on  at 20w4d    She states that she is feeling well today otherwise.  + FM/flutters. She denies fever, chills, HA, nausea, vomiting, constipation, diarrhea, and acute swelling. Did have some constipation right after procedure but that has since resolved and has had BM since cerclage placement. One yesterday.     Lab Results   Component Value Date    AS Negative 2021    CHPCRT Negative 2021    GCPCRT Negative 2021    HGB 12.1 2021       GBS Status:   No results found for: GBS          OBHX:  OB History    Para Term  AB Living   1 0 0 0 0 0   SAB TAB Ectopic Multiple Live Births   0 0 0 0 0      # Outcome Date GA Lbr Andres/2nd Weight Sex Delivery Anes PTL Lv   1 Current                MedicalHX:  History reviewed. No pertinent past medical history.    SurgicalHX:  Past Surgical History:   Procedure Laterality Date     APPENDECTOMY  2011     CERCLAGE CERVICAL N/A 2021    Procedure: CERCLAGE, CERVIX, VAGINAL APPROACH;  Surgeon: Miladys Contreras MD;  [facility]     TONSILLECTOMY  2009     WISDOM TOOTH EXTRACTION         Medications:  No current facility-administered medications on file prior to encounter.  docusate sodium (COLACE) 100 MG capsule, Take 100 mg by mouth daily  Prenatal Vit-Fe Fumarate-FA (PRENATAL MULTIVITAMIN W/IRON) " "27-0.8 MG tablet, Take 1 tablet by mouth daily  sertraline (ZOLOFT) 25 MG tablet, Take 25 mg by mouth daily        Allergies:  Allergies   Allergen Reactions     Egg [Egg Shells] Unknown     Amoxicillin Rash     Cefzil [Cefprozil] Rash       FamilyHX:    History reviewed. No pertinent family history.    SocialHX:  Social History     Socioeconomic History     Marital status:      Spouse name: None     Number of children: None     Years of education: None     Highest education level: None   Occupational History     None   Tobacco Use     Smoking status: Never Smoker     Smokeless tobacco: Never Used   Substance and Sexual Activity     Alcohol use: Never     Drug use: Never     Sexual activity: Not Currently     Partners: Male   Other Topics Concern     None   Social History Narrative     None     Social Determinants of Health     Financial Resource Strain:      Difficulty of Paying Living Expenses:    Food Insecurity:      Worried About Running Out of Food in the Last Year:      Ran Out of Food in the Last Year:    Transportation Needs:      Lack of Transportation (Medical):      Lack of Transportation (Non-Medical):    Physical Activity:      Days of Exercise per Week:      Minutes of Exercise per Session:    Stress:      Feeling of Stress :    Social Connections:      Frequency of Communication with Friends and Family:      Frequency of Social Gatherings with Friends and Family:      Attends [employer] Services:      Active Member of Clubs or Organizations:      Attends Club or Organization Meetings:      Marital Status:    Intimate Partner Violence:      Fear of Current or Ex-Partner:      Emotionally Abused:      Physically Abused:      Sexually Abused:        ROS: 10-point ROS negative except as in HPI.    Physical Exam:  Vitals:    08/28/21 2136   BP: 118/68   Pulse: 75   Resp: 18   Temp: 98.1  F (36.7  C)   TempSrc: Oral   Weight: 89.8 kg (198 lb)   Height: 1.702 m (5' 7\")     GEN: resting comfortably in " bed, NAD   CV: Regular rate, well perfused  PULM: On room air, no increased work of breathing  ABD: soft, gravid, non-tender, non-distended  EXT: trace edema  CVX:   - SSE: negative pooling, no blood in vault, no  cervix closed, sutures visualized x2  Presentation: variable by BSUS  MVP 5.23 by BSUS        Doptones :145-150  TOCO: 2-3 >1hr / uterine irritability     A/P: 30 year old  at 21w2d, here for rule out previable PPROM with Olivas cervical cerclage, two sutures (knots at 12 and 1 o'clock positions).      # Rule out previable PPROM   - Cervical os appears closed and two knots visualized and palpated. No undue tension on suture.   - Membranes: intact by BSUS, with MVP consistent with what it was last imaging,   - Consider other sources for patient's contractions/cramping/abdominal pain   - Labs: UA reflex UC, GC/C, Wet Prep, GBS collected  - Encouraged Hydration  - Discussed at this GA, if she were ruptutred that the pregnancy is non viable, but that if she has changed some we would admit her to antepartum vs manage closely in the outpatient setting  - patient has an appontment on Monday, lives about 15 min away too. appropraite for discharge to home  - her PO hysration does seem to help    Dispo: today    Patient discussed with Dr. Diane Silva MD, [facility] - PGY-3  2021,  11:45 PM

## 2021-08-30 ENCOUNTER — ANCILLARY PROCEDURE (OUTPATIENT)
Dept: ULTRASOUND IMAGING | Facility: HOSPITAL | Age: 31
End: 2021-08-30
Attending: OBSTETRICS & GYNECOLOGY
Payer: COMMERCIAL

## 2021-08-30 ENCOUNTER — OFFICE VISIT (OUTPATIENT)
Dept: MATERNAL FETAL MEDICINE | Facility: HOSPITAL | Age: 31
End: 2021-08-30
Attending: OBSTETRICS & GYNECOLOGY
Payer: COMMERCIAL

## 2021-08-30 DIAGNOSIS — O34.32 CERVICAL INSUFFICIENCY DURING PREGNANCY IN SECOND TRIMESTER, ANTEPARTUM: ICD-10-CM

## 2021-08-30 DIAGNOSIS — O35.9XX0 SUSPECTED FETAL ANOMALY, ANTEPARTUM, SINGLE OR UNSPECIFIED FETUS: ICD-10-CM

## 2021-08-30 DIAGNOSIS — O34.32 CERVICAL INSUFFICIENCY DURING PREGNANCY IN SECOND TRIMESTER, ANTEPARTUM: Primary | ICD-10-CM

## 2021-08-30 LAB — BACTERIA UR CULT: NORMAL

## 2021-08-30 PROCEDURE — 99207 PR NO CHARGE LOS: CPT | Performed by: OBSTETRICS & GYNECOLOGY

## 2021-08-30 PROCEDURE — 76811 OB US DETAILED SNGL FETUS: CPT | Mod: 26 | Performed by: OBSTETRICS & GYNECOLOGY

## 2021-08-30 PROCEDURE — 76811 OB US DETAILED SNGL FETUS: CPT

## 2021-08-30 NOTE — PROGRESS NOTES
"Please see \"Imaging\" tab under Chart Review for full details.    Ariella Coronado MD  Maternal Fetal Medicine    "

## 2021-09-02 ENCOUNTER — HOSPITAL ENCOUNTER (OUTPATIENT)
Facility: CLINIC | Age: 31
Discharge: HOME | End: 2021-09-02
Attending: OBSTETRICS & GYNECOLOGY | Admitting: OBSTETRICS & GYNECOLOGY
Payer: COMMERCIAL

## 2021-09-02 ENCOUNTER — APPOINTMENT (OUTPATIENT)
Dept: ULTRASOUND IMAGING | Facility: CLINIC | Age: 31
End: 2021-09-02
Payer: COMMERCIAL

## 2021-09-02 VITALS
RESPIRATION RATE: 18 BRPM | TEMPERATURE: 97.7 F | DIASTOLIC BLOOD PRESSURE: 67 MMHG | HEART RATE: 63 BPM | SYSTOLIC BLOOD PRESSURE: 114 MMHG

## 2021-09-02 DIAGNOSIS — Z36.89 ENCOUNTER FOR TRIAGE IN PREGNANT PATIENT: Primary | ICD-10-CM

## 2021-09-02 PROCEDURE — 93971 EXTREMITY STUDY: CPT | Mod: 26 | Performed by: RADIOLOGY

## 2021-09-02 PROCEDURE — G0463 HOSPITAL OUTPT CLINIC VISIT: HCPCS | Mod: 25

## 2021-09-02 PROCEDURE — 93971 EXTREMITY STUDY: CPT | Mod: LT

## 2021-09-02 PROCEDURE — 59025 FETAL NON-STRESS TEST: CPT

## 2021-09-02 RX ORDER — HYDROXYZINE PAMOATE 50 MG/1
50 CAPSULE ORAL 3 TIMES DAILY PRN
Qty: 20 CAPSULE | Refills: 0 | Status: SHIPPED | OUTPATIENT
Start: 2021-09-02 | End: 2021-11-11

## 2021-09-02 RX ORDER — LIDOCAINE 40 MG/G
CREAM TOPICAL
Status: DISCONTINUED | OUTPATIENT
Start: 2021-09-02 | End: 2021-09-02 | Stop reason: HOSPADM

## 2021-09-02 NOTE — PROGRESS NOTES
"L&D Triage Progress Note     HPI: Yue Teran is a 30 year old  at 22w0d by 10w6d US, here for evaluation of LLE pain and contractions x2 overnight.    Ms. Teran called in this morning as she had a contraction lasting 4 min that woke her up from sleep. She noted her abdomen was hard like the table. It then happened again about 4-5 min later and again lasted ~3-4 min. She has not experienced any more contractions since then. This was at about 0400 this AM. Minimal cramping, but that has been happening since the cecrlage placement and there is no increase in strength or frequency of this. No LOF, no leaking, no VB. Some discharge, not malodorous.  +FM. No vaginal pain. Nothing in the vagina. No concern for STI. Last BM yesterday, taking daily colace. Stools are not too hard.     She states that she is feeling well today.  She denies fever, chills, nausea, vomiting, constipation, diarrhea, dysuria, and acute swelling.    Regarding leg pain this has been an issue for about a day. Does not remember any injury or trama to the area. Looks up sxs online and was concerned - \"even though I did not have all of the symptoms\"; no warmth, no redness, no swelling on either leg. No R leg pain. No personal hx of clots. No dyspnea, chest pain or palpitations.  She is unsure if her dad has had clots, he has had \"some heart issues\".      Pregnancy notable for:  - Cervical insufficiency by physical exam   - Depression/anxiety   - Olivas cervical cerclage, two sutures (knots at 12 and 1 o'clock positions), placed on  at 20w4d  - LLE pain - dopplers neg      Lab Results   Component Value Date    AS Negative 2021    CHPCRT Negative 2021    GCPCRT Negative 2021    HGB 11.4 (L) 2021       GBS Status:   No results found for: GBS          OBHX:  OB History    Para Term  AB Living   1 0 0 0 0 0   SAB TAB Ectopic Multiple Live Births   0 0 0 0 0      # Outcome Date GA Lbr Andres/2nd Weight Sex " Delivery Anes PTL Lv   1 Current                MedicalHX:  No past medical history on file.    SurgicalHX:  Past Surgical History:   Procedure Laterality Date     APPENDECTOMY  2011     CERCLAGE CERVICAL N/A 8/23/2021    Procedure: CERCLAGE, CERVIX, VAGINAL APPROACH;  Surgeon: Miladys Contreras MD;  [facility]     TONSILLECTOMY  2009     WISDOM TOOTH EXTRACTION  2008       Medications:  No current facility-administered medications on file prior to encounter.  docusate sodium (COLACE) 100 MG capsule, Take 100 mg by mouth daily  Prenatal Vit-Fe Fumarate-FA (PRENATAL MULTIVITAMIN W/IRON) 27-0.8 MG tablet, Take 1 tablet by mouth daily  sertraline (ZOLOFT) 25 MG tablet, Take 25 mg by mouth daily        Allergies:  Allergies   Allergen Reactions     Egg [Egg Shells] Unknown     Amoxicillin Rash     Cefzil [Cefprozil] Rash       FamilyHX:    No family history on file.    SocialHX:  Social History     Socioeconomic History     Marital status:      Spouse name: Not on file     Number of children: Not on file     Years of education: Not on file     Highest education level: Not on file   Occupational History     Not on file   Tobacco Use     Smoking status: Never Smoker     Smokeless tobacco: Never Used   Substance and Sexual Activity     Alcohol use: Never     Drug use: Never     Sexual activity: Not Currently     Partners: Male   Other Topics Concern     Not on file   Social History Narrative     Not on file     Social Determinants of Health     Financial Resource Strain:      Difficulty of Paying Living Expenses:    Food Insecurity:      Worried About Running Out of Food in the Last Year:      Ran Out of Food in the Last Year:    Transportation Needs:      Lack of Transportation (Medical):      Lack of Transportation (Non-Medical):    Physical Activity:      Days of Exercise per Week:      Minutes of Exercise per Session:    Stress:      Feeling of Stress :    Social Connections:      Frequency of  Communication with Friends and Family:      Frequency of Social Gatherings with Friends and Family:      Attends Confucianist Services:      Active Member of Clubs or Organizations:      Attends Club or Organization Meetings:      Marital Status:    Intimate Partner Violence:      Fear of Current or Ex-Partner:      Emotionally Abused:      Physically Abused:      Sexually Abused:        ROS: 10-point ROS negative except as in HPI.    Physical Exam:  Vitals:    09/02/21 1243   BP: 114/67   Pulse: 63   Resp: 18   Temp: 97.7  F (36.5  C)   TempSrc: Oral     GEN: resting comfortably in bed, NAD   CV: Regular rate, well perfused. No murmur   PULM: On room air, no increased work of breathing; CTAB anterior lung fields. Sating appropriately on RA   ABD: soft, gravid, non-tender, non-distended  EXT: no edema, non tender to palpation, no erythema, no edema, negative Homans sign  CVX: deferred  Presentation: breech by US on 8/30    Doptones : 150s  TOCO: no contractions on toco     US on 8/30 at 21+4:      GENERAL EVALUATION  ---------------------------------------------------------------------------------------------------------  Cardiac activity present.  bpm.  Fetal movements present.  Presentation breech.  Placenta Placental site: posterior, No Previa, > 2 cm from internal os.  Umbilical cord 3 vessel cord.  Amniotic fluid Amount of AF: normal. MVP 4.2 cm.  IMPRESSION  ---------------------------------------------------------------------------------------------------------  1) Wallis intrauterine pregnancy at 21w 4d gestational age.  2) None of the anomalies commonly detected by ultrasound were evident in the detailed fetal anatomic survey, however some views were suboptimal, as described above.  3) Growth parameters and estimated fetal weight were consistent with established dates.  4) The amniotic fluid volume appeared normal.  5) Normal fetal activity for gestational age.  6) On transabdominal imaging the  "cervix appears closed. Cervical cerclage is noted in good position.      LE Doppler US:   HISTORY: LLE pain     TECHNIQUE:  Gray-scale evaluation with compression, spectral flow, and  color Doppler assessment of the deep venous system of the left leg  from groin to knee, and then at the ankle.     FINDINGS:  In the left lower extremity, the common femoral, femoral, popliteal  and posterior tibial veins demonstrate normal compressibility and  blood flow. Targeted scan of the area where patient has pain was  unremarkable.                                                                    IMPRESSION:  1.  No evidence left lower extremity deep venous thrombosis.     A/P: 30 year old  at 22w0d by 10w6d US here for rule out previable PTL with Olivas cervical cerclage, two sutures (knots at 12 and 1 o'clock positions) and LLE DVT.      # Rule out previable labor   - No contractions on monitor. Abdomen non-tender, no uterine tenderness.   - Last evaluated in triage () and infectious workup was negative for UTI (UCx neg), GC/Chlam, wet prep neg. CBC nl. Remains Afebrile. Pt has no new symptoms today.   - At the time, noted: \"Cervical os appears closed and two knots visualized and palpated. No undue tension on suture.\"  - Discussed we could repeat cervical exam now to check her cerclage and cervix, but pt prefers not to do exam and does not think it is necessary. She only has had two long contractions at 0400 which (coupled w LLE pain - see below) prompted her visit to triage.    - Discussed that labor contractions are usually more intermittent and do not last as long as 4 min, typically they last ~1min and slowly build up then dissipate. If they last an hr or more, she should call the careline  - Membranes: no ongoing leaking fluid  - Encouraged Hydration and taking daily colace, pt aware to do so so as not to strain w defecation w cerclage in place.  - Rx vistaril, can try tylenol, gentle stretching, hot packs. " Also dicussed importance of hydration    - lives about 15 min away; reviewd signs/sxs to call/return to triage and Yue is in touch with her symptoms and appreciate how well she expresses any new concerns  - Appropraite for discharge to home  - Patient would like to transfer her care to here - appointment scheduled for her w Dr Vasquez on 9/9 at 1300, informed pt  - Has an US appt on 9/20 with Dr Coronado     # Fetal Well-Being  - Appropriate dop tones for GA  - Patient endorses active FM    # LLE Pain  - No erythema or edema, negative Rabia's sign.   - Discussed symptomatic cares: tylenol, heat, stretching   - Counseled on hydration, increased potassium diet  - CV and Pulm exam wnl; asymptomatic for PE    Dispo: to home    Patient seen and discussed with Dr. Kaelyn Silva MD, [facility] Obstetrics and Gynecology - PGY-3  9/2/2021,  2:13 PM    I saw the patient and agree with the above physical exam, assessment and management.   Stacy Art DO FACOG  Maternal Fetal Medicine Specialist  Pager: [phone]  Mobile: [phone]

## 2021-09-02 NOTE — PLAN OF CARE
"Data: [facility] at 1218.   Reason for maternal/fetal assessment per patient is Leg Pain  . Pt c/o LLE pain, no edema or erythema. Pt also noted a few contractions overnight and occasional tightening today. Patient is a . Prenatal record reviewed.      OB History    Para Term  AB Living   1 0 0 0 0 0   SAB TAB Ectopic Multiple Live Births   0 0 0 0 0      # Outcome Date GA Lbr Andres/2nd Weight Sex Delivery Anes PTL Lv   1 Current               Medical History: No past medical history on file.. Gestational Age 22w0d. VSS. Cervix: not examined.  Fetal movement present. Patient denies pelvic pressure, UTI symptoms, GI problems, bloody show, vaginal bleeding, edema, headache, visual disturbances, epigastric or URQ pain, rupture of membranes. Support person,  Red, present.  Action: Verbal consent for EFM. Triage assessment completed. EFM applied upon arrival. Uterine assessment showed no contractions. Fetal assessment: Presumed adequate fetal oxygenation documented (see flow record). Patient education pamphlets given on fetal movement counts. Patient instructed to report change in fetal movement, vaginal leaking of fluid or bleeding, abdominal pain, or any concerns related to the pregnancy to her nurse/physician.   Response: Dr. Silva informed of patients arrival. Provider ordered LLE ultrasound and assessed patient. See ultrasound report for details, \"unremarkable,\" Plan per provider is to discharge patient home with prescription for vistaril. Patient verbalized understanding of education and verbalized agreement with plan. Discharged ambulatory at 1510.    "

## 2021-09-02 NOTE — DISCHARGE INSTRUCTIONS
"Discharge Instruction for Undelivered Patients      You were seen for: Lower left leg pain and contractions  We Consulted: Dr. Art and Dr. Silva  You had (Test or Medicine): Ultrasound of lower left extremity - Result \"Unremarkable\"     Diet:   Drink 8 to 12 glasses of liquids (milk, juice, water) every day.     Activity:  Rest the pelvic area. No sex. Do not stimulate breasts or nipples.     Call your provider if you notice:  Swelling in your face or increased swelling in your hands or legs.  Headaches that are not relieved by Tylenol (acetaminophen).  Changes in your vision (blurring: seeing spots or stars.)  Nausea (sick to your stomach) and vomiting (throwing up).   Weight gain of 5 pounds or more per week.  Heartburn that doesn't go away.  Signs of bladder infection: pain when you urinate (use the toilet), need to go more often and more urgently.  The bag of benjamin (rupture of membranes) breaks, or you notice leaking in your underwear.  Bright red blood in your underwear.  Abdominal (lower belly) or stomach pain.  *If less than 34 weeks: Contractions (tightening) more than 6 times in one hour.  Increase or change in vaginal discharge (note the color and amount)    Follow-up:  Appointment with Dr. Vasquez at [facility] on Thursday, September 9th at 1pm        "

## 2021-09-09 ENCOUNTER — OFFICE VISIT (OUTPATIENT)
Dept: OBGYN | Facility: CLINIC | Age: 31
End: 2021-09-09
Attending: OBSTETRICS & GYNECOLOGY
Payer: COMMERCIAL

## 2021-09-09 VITALS
HEART RATE: 80 BPM | DIASTOLIC BLOOD PRESSURE: 75 MMHG | RESPIRATION RATE: 18 BRPM | HEIGHT: 67 IN | WEIGHT: 203.5 LBS | SYSTOLIC BLOOD PRESSURE: 123 MMHG | BODY MASS INDEX: 31.94 KG/M2

## 2021-09-09 DIAGNOSIS — O34.30 CERVICAL INSUFFICIENCY DURING PREGNANCY, ANTEPARTUM: ICD-10-CM

## 2021-09-09 DIAGNOSIS — O09.92 HIGH-RISK PREGNANCY IN SECOND TRIMESTER: Primary | ICD-10-CM

## 2021-09-09 PROBLEM — O09.90 HRP (HIGH RISK PREGNANCY): Status: ACTIVE | Noted: 2021-09-09

## 2021-09-09 PROBLEM — Z36.89 ENCOUNTER FOR TRIAGE IN PREGNANT PATIENT: Status: RESOLVED | Noted: 2021-08-23 | Resolved: 2021-09-09

## 2021-09-09 PROCEDURE — G0463 HOSPITAL OUTPT CLINIC VISIT: HCPCS

## 2021-09-09 PROCEDURE — 99202 OFFICE O/P NEW SF 15 MIN: CPT | Performed by: OBSTETRICS & GYNECOLOGY

## 2021-09-09 ASSESSMENT — PAIN SCALES - GENERAL: PAINLEVEL: NO PAIN (0)

## 2021-09-09 ASSESSMENT — MIFFLIN-ST. JEOR: SCORE: 1685.7

## 2021-09-09 NOTE — LETTER
"2021       RE: Yue Teran  [address] [place] [address]     Dear Colleague,    Thank you for referring your patient, Yue Teran, to the [facility]. Please see a copy of my visit note below.    OB ECHO VISIT    S: 30 year old  @23w0d by 8w1d US not c/w LMP here for ECHO visit. Pregnancy complicated by cervical insufficiency s/p rescue cerclage at 20w4d- Deisy with 2 sutures. Overall doing well since cerclage placement. Last US residual cervical length 2.76cm. Denies vaginal bleeding or cramping.     Notes good fetal movement. Denies other concerns today. Transferring care to deliver at [facility].     OBHx: Primigravid  Past Medical History:   Diagnosis Date     Anxiety      Depressive disorder      Past Surgical History:   Procedure Laterality Date     APPENDECTOMY  2011     CERCLAGE CERVICAL N/A 2021    Procedure: CERCLAGE, CERVIX, VAGINAL APPROACH;  Surgeon: Miladys Contreras MD;  Location: [facility] L[facility]     CERCLAGE CERVICAL  2021     TONSILLECTOMY  2009     TONSILLECTOMY       WISDOM TOOTH EXTRACTION       O: /75 (BP Location: Left arm, Cuff Size: Adult Regular)   Pulse 80   Resp 18   Ht 1.702 m (5' 7\")   Wt 92.3 kg (203 lb 8 oz)   LMP 2021   BMI 31.87 kg/m      A/P: 30 year old  @23w0d pregnancy complicated by cervical insufficiency s/p rescue cerclage.     Reviewed prenatal care at our practice and role of learners.   Reviewed precautions to monitor for cervical insufficiency and  labor.   EOB next visit  US follow up with MFM on .   Discussed cerclage removal at 36wks.   RTC 4 weeks  SUMAYA signed today for prenatal records.     Lorena Vasquez MD    "

## 2021-09-09 NOTE — PROGRESS NOTES
"OB ECHO VISIT    S: 30 year old  @23w0d by 8w1d US not c/w LMP here for ECHO visit. Pregnancy complicated by cervical insufficiency s/p rescue cerclage at 20w4d- Deisy with 2 sutures. Overall doing well since cerclage placement. Last US residual cervical length 2.76cm. Denies vaginal bleeding or cramping.     Notes good fetal movement. Denies other concerns today. Transferring care to deliver at [facility].     OBHx: Primigravid  Past Medical History:   Diagnosis Date     Anxiety      Depressive disorder      Past Surgical History:   Procedure Laterality Date     APPENDECTOMY  2011     CERCLAGE CERVICAL N/A 2021    Procedure: CERCLAGE, CERVIX, VAGINAL APPROACH;  Surgeon: Miladys Contreras MD;  [facility]     CERCLAGE CERVICAL  2021     TONSILLECTOMY  2009     TONSILLECTOMY       WISDOM TOOTH EXTRACTION       O: /75 (BP Location: Left arm, Cuff Size: Adult Regular)   Pulse 80   Resp 18   Ht 1.702 m (5' 7\")   Wt 92.3 kg (203 lb 8 oz)   LMP 2021   BMI 31.87 kg/m      A/P: 30 year old  @23w0d pregnancy complicated by cervical insufficiency s/p rescue cerclage.     Reviewed prenatal care at our practice and role of learners.   Reviewed precautions to monitor for cervical insufficiency and  labor.   EOB next visit  US follow up with MFM on .   Discussed cerclage removal at 36wks.   RTC 4 weeks  SUMAYA signed today for prenatal records.     Lorena Vasquez MD      "

## 2021-09-20 ENCOUNTER — OFFICE VISIT (OUTPATIENT)
Dept: MATERNAL FETAL MEDICINE | Facility: HOSPITAL | Age: 31
End: 2021-09-20
Attending: OBSTETRICS & GYNECOLOGY
Payer: COMMERCIAL

## 2021-09-20 ENCOUNTER — ANCILLARY PROCEDURE (OUTPATIENT)
Dept: ULTRASOUND IMAGING | Facility: HOSPITAL | Age: 31
End: 2021-09-20
Attending: OBSTETRICS & GYNECOLOGY
Payer: COMMERCIAL

## 2021-09-20 DIAGNOSIS — O34.32 CERVICAL INSUFFICIENCY DURING PREGNANCY IN SECOND TRIMESTER, ANTEPARTUM: Primary | ICD-10-CM

## 2021-09-20 DIAGNOSIS — O35.9XX0 SUSPECTED FETAL ANOMALY, ANTEPARTUM, SINGLE OR UNSPECIFIED FETUS: ICD-10-CM

## 2021-09-20 DIAGNOSIS — O34.32 CERVICAL INSUFFICIENCY DURING PREGNANCY IN SECOND TRIMESTER, ANTEPARTUM: ICD-10-CM

## 2021-09-20 PROCEDURE — 76816 OB US FOLLOW-UP PER FETUS: CPT

## 2021-09-20 PROCEDURE — 99207 PR NO CHARGE LOS: CPT | Performed by: OBSTETRICS & GYNECOLOGY

## 2021-10-06 NOTE — PROGRESS NOTES
" 30 year old, , 27w0d,  No vaginal bleeding,  leakage of fluid, or change in vaginal discharge.  Occs mild cramping. Good fetal movement.       No HA, visual changes, RUQ or epigastric pain.     The patient presents with the following concerns:   - Had some pain this past week. Occasional lower pelvic cramp, low back pain, and \"pain in cervix.\" Mostly felt after exercise. Having some urinary urgency, no dysuria. Discussed UA/UC, pt agreeable.   - Mood- slightly anxious, but is now feeling better than right after cerclage.   - Rescue cerclage at 20 weeks. Removal planned for 36 weeks. Transferred care at 23 weeks to delivery at [facility].     PHQ-9 SCORE 10/7/2021   PHQ-9 Total Score 2     Education completed today includes breast feeding, [facility] hand out, contraception, counting movements, signs of pre-term labor, when to present to birthplace, post partum depression, GBS, getting enough iron and labor induction.    Birth preferences reviewed: likely wants an epidural.     Labor support: , maybe sister      Feeding plans :     Contraception planned:  Considering postpartum tubal. Wants to discuss with  and then review consent at next visit. States this pregnancy has been hard and they had started thinking they wouldn't be able to have kids since it took 3 years to acheive this pregnancy.     Reviewed hospital admission labs, IV placement, AMTSL. Pt agreeable    Reviewed vit K injection for baby after birth. Pt agreeable    The following labs were ordered today:       GCT, CBC w platelets, Vitamin D and Anti-treponema     Water birth consent form was not given.    Blood type:   Antibody Screen   Date Value Ref Range Status   2021 Negative Negative Final   Rhogam  was notgiven.  TDAP  wasgiven.  A/P:  Encounter Diagnoses   Name Primary?     High-risk pregnancy in second trimester Yes     Cervical insufficiency during pregnancy, antepartum      Pelvic pain in female  "     Urinary urgency      Orders Placed This Encounter   Procedures     INFLUENZA VACCINE IM >6 MO VALENT IIV4 (AFLURIA/FLUZONE)     TDAP VACCINE (Adacel, Boostrix)  [[id number]]     Glucose tolerance gest screen 1 hour     CBC with platelets     Treponema Abs w Reflex to RPR and Titer     Vitamin D Deficiency     Routine UA with Microscopic     Reinforced daily fetal movement counts.  Reviewed why/how to contact provider if headache/visual changes/RUQ or epigastric pain, decreased fetal movement, vaginal bleeding, leakage of fluid.    Discuss postpartum tubal at next visit  Continue scheduled prenatal care    KUSHAL BettencourtM

## 2021-10-07 ENCOUNTER — OFFICE VISIT (OUTPATIENT)
Dept: OBGYN | Facility: CLINIC | Age: 31
End: 2021-10-07
Attending: MIDWIFE
Payer: COMMERCIAL

## 2021-10-07 ENCOUNTER — APPOINTMENT (OUTPATIENT)
Dept: LAB | Facility: CLINIC | Age: 31
End: 2021-10-07
Attending: MIDWIFE
Payer: COMMERCIAL

## 2021-10-07 VITALS
SYSTOLIC BLOOD PRESSURE: 133 MMHG | DIASTOLIC BLOOD PRESSURE: 83 MMHG | WEIGHT: 214 LBS | HEART RATE: 60 BPM | BODY MASS INDEX: 33.52 KG/M2

## 2021-10-07 DIAGNOSIS — O09.92 HIGH-RISK PREGNANCY IN SECOND TRIMESTER: Primary | ICD-10-CM

## 2021-10-07 DIAGNOSIS — R10.2 PELVIC PAIN IN FEMALE: ICD-10-CM

## 2021-10-07 DIAGNOSIS — R39.15 URINARY URGENCY: ICD-10-CM

## 2021-10-07 DIAGNOSIS — O34.30 CERVICAL INSUFFICIENCY DURING PREGNANCY, ANTEPARTUM: ICD-10-CM

## 2021-10-07 LAB
ALBUMIN UR-MCNC: NEGATIVE MG/DL
APPEARANCE UR: CLEAR
BACTERIA #/AREA URNS HPF: ABNORMAL /HPF
BILIRUB UR QL STRIP: NEGATIVE
COLOR UR AUTO: ABNORMAL
DEPRECATED CALCIDIOL+CALCIFEROL SERPL-MC: 28 UG/L (ref 20–75)
ERYTHROCYTE [DISTWIDTH] IN BLOOD BY AUTOMATED COUNT: 12.4 % (ref 10–15)
GLUCOSE 1H P 50 G GLC PO SERPL-MCNC: 145 MG/DL (ref 70–129)
GLUCOSE UR STRIP-MCNC: 500 MG/DL
HCT VFR BLD AUTO: 35.2 % (ref 35–47)
HGB BLD-MCNC: 11.7 G/DL (ref 11.7–15.7)
HGB UR QL STRIP: NEGATIVE
KETONES UR STRIP-MCNC: NEGATIVE MG/DL
LEUKOCYTE ESTERASE UR QL STRIP: NEGATIVE
MCH RBC QN AUTO: 30.1 PG (ref 26.5–33)
MCHC RBC AUTO-ENTMCNC: 33.2 G/DL (ref 31.5–36.5)
MCV RBC AUTO: 91 FL (ref 78–100)
MUCOUS THREADS #/AREA URNS LPF: PRESENT /LPF
NITRATE UR QL: NEGATIVE
PH UR STRIP: 6.5 [PH] (ref 5–7)
PLATELET # BLD AUTO: 187 10E3/UL (ref 150–450)
RBC # BLD AUTO: 3.89 10E6/UL (ref 3.8–5.2)
RBC URINE: 0 /HPF
SP GR UR STRIP: 1.02 (ref 1–1.03)
SQUAMOUS EPITHELIAL: 2 /HPF
T PALLIDUM AB SER QL: NONREACTIVE
UROBILINOGEN UR STRIP-MCNC: NORMAL MG/DL
WBC # BLD AUTO: 9.4 10E3/UL (ref 4–11)
WBC URINE: 2 /HPF

## 2021-10-07 PROCEDURE — 90686 IIV4 VACC NO PRSV 0.5 ML IM: CPT

## 2021-10-07 PROCEDURE — 36415 COLL VENOUS BLD VENIPUNCTURE: CPT | Performed by: MIDWIFE

## 2021-10-07 PROCEDURE — 82306 VITAMIN D 25 HYDROXY: CPT | Performed by: MIDWIFE

## 2021-10-07 PROCEDURE — 86780 TREPONEMA PALLIDUM: CPT | Performed by: MIDWIFE

## 2021-10-07 PROCEDURE — 81001 URINALYSIS AUTO W/SCOPE: CPT | Performed by: MIDWIFE

## 2021-10-07 PROCEDURE — G0008 ADMIN INFLUENZA VIRUS VAC: HCPCS

## 2021-10-07 PROCEDURE — G0463 HOSPITAL OUTPT CLINIC VISIT: HCPCS

## 2021-10-07 PROCEDURE — 250N000011 HC RX IP 250 OP 636

## 2021-10-07 PROCEDURE — 90715 TDAP VACCINE 7 YRS/> IM: CPT

## 2021-10-07 PROCEDURE — 82952 GTT-ADDED SAMPLES: CPT | Performed by: MIDWIFE

## 2021-10-07 PROCEDURE — 99212 OFFICE O/P EST SF 10 MIN: CPT | Performed by: MIDWIFE

## 2021-10-07 PROCEDURE — 85027 COMPLETE CBC AUTOMATED: CPT | Performed by: MIDWIFE

## 2021-10-07 PROCEDURE — 90472 IMMUNIZATION ADMIN EACH ADD: CPT

## 2021-10-07 ASSESSMENT — PATIENT HEALTH QUESTIONNAIRE - PHQ9
SUM OF ALL RESPONSES TO PHQ QUESTIONS 1-9: 2
5. POOR APPETITE OR OVEREATING: NOT AT ALL

## 2021-10-07 ASSESSMENT — ANXIETY QUESTIONNAIRES
2. NOT BEING ABLE TO STOP OR CONTROL WORRYING: NOT AT ALL
6. BECOMING EASILY ANNOYED OR IRRITABLE: NOT AT ALL
1. FEELING NERVOUS, ANXIOUS, OR ON EDGE: NOT AT ALL
5. BEING SO RESTLESS THAT IT IS HARD TO SIT STILL: NOT AT ALL
3. WORRYING TOO MUCH ABOUT DIFFERENT THINGS: NOT AT ALL
GAD7 TOTAL SCORE: 0
7. FEELING AFRAID AS IF SOMETHING AWFUL MIGHT HAPPEN: NOT AT ALL

## 2021-10-07 NOTE — LETTER
"10/7/2021       RE: Yue Teran  [address] [place] [address]     Dear Colleague,    Thank you for referring your patient, Yue Teran, to the [facility] [place] at [facility]. Please see a copy of my visit note below.     30 year old, , 27w0d,  No vaginal bleeding,  leakage of fluid, or change in vaginal discharge.  Occs mild cramping. Good fetal movement.       No HA, visual changes, RUQ or epigastric pain.     The patient presents with the following concerns:   - Had some pain this past week. Occasional lower pelvic cramp, low back pain, and \"pain in cervix.\" Mostly felt after exercise. Having some urinary urgency, no dysuria. Discussed UA/UC, pt agreeable.   - Mood- slightly anxious, but is now feeling better than right after cerclage.   - Rescue cerclage at 20 weeks. Removal planned for 36 weeks. Transferred care at 23 weeks to delivery at [facility].     PHQ-9 SCORE 10/7/2021   PHQ-9 Total Score 2     Education completed today includes breast feeding, [facility] hand out, contraception, counting movements, signs of pre-term labor, when to present to birthplace, post partum depression, GBS, getting enough iron and labor induction.    Birth preferences reviewed: likely wants an epidural.     Labor support: , maybe sister     [place] Feeding plans :     Contraception planned:  Considering postpartum tubal. Wants to discuss with  and then review consent at next visit. States this pregnancy has been hard and they had started thinking they wouldn't be able to have kids since it took 3 years to acheive this pregnancy.     Reviewed hospital admission labs, IV placement, AMTSL. Pt agreeable    Reviewed vit K injection for baby after birth. Pt agreeable    The following labs were ordered today:       GCT, CBC w platelets, Vitamin D and Anti-treponema     Water birth consent form was not given.    Blood type: "   Antibody Screen   Date Value Ref Range Status   08/23/2021 Negative Negative Final   Rhogam  was notgiven.  TDAP  wasgiven.  A/P:  Encounter Diagnoses   Name Primary?     High-risk pregnancy in second trimester Yes     Cervical insufficiency during pregnancy, antepartum      Pelvic pain in female      Urinary urgency      Orders Placed This Encounter   Procedures     INFLUENZA VACCINE IM >6 MO VALENT IIV4 (AFLURIA/FLUZONE)     TDAP VACCINE (Adacel, Boostrix)  [[id number]]     Glucose tolerance gest screen 1 hour     CBC with platelets     Treponema Abs w Reflex to RPR and Titer     Vitamin D Deficiency     Routine UA with Microscopic     Reinforced daily fetal movement counts.  Reviewed why/how to contact provider if headache/visual changes/RUQ or epigastric pain, decreased fetal movement, vaginal bleeding, leakage of fluid.    Discuss postpartum tubal at next visit  Continue scheduled prenatal care    KUSHAL Bettencourt CNM          Records received from [facility] [phone].   Entered labs and sent to scanning

## 2021-10-07 NOTE — RESULT ENCOUNTER NOTE
Called and spoke with patient. Reviewed results and instructions for completing 3 hour GTT. Pathful message sent as well with instructions. Pt verbalized understanding and agreement, denied further questions/concerns.

## 2021-10-08 ASSESSMENT — ANXIETY QUESTIONNAIRES: GAD7 TOTAL SCORE: 0

## 2021-10-11 ENCOUNTER — LAB (OUTPATIENT)
Dept: LAB | Facility: CLINIC | Age: 31
End: 2021-10-11
Payer: COMMERCIAL

## 2021-10-11 DIAGNOSIS — O09.92 HIGH-RISK PREGNANCY IN SECOND TRIMESTER: ICD-10-CM

## 2021-10-11 LAB
GESTATIONAL GTT 1 HR POST DOSE: 182 MG/DL (ref 60–179)
GESTATIONAL GTT 2 HR POST DOSE: 132 MG/DL (ref 60–154)
GESTATIONAL GTT 3 HR POST DOSE: 111 MG/DL (ref 60–139)
GLUCOSE P FAST SERPL-MCNC: 83 MG/DL (ref 60–94)

## 2021-10-11 PROCEDURE — 36415 COLL VENOUS BLD VENIPUNCTURE: CPT

## 2021-10-11 PROCEDURE — 82947 ASSAY GLUCOSE BLOOD QUANT: CPT

## 2021-10-11 PROCEDURE — 82950 GLUCOSE TEST: CPT

## 2021-10-11 PROCEDURE — 82951 GLUCOSE TOLERANCE TEST (GTT): CPT

## 2021-10-16 ENCOUNTER — HEALTH MAINTENANCE LETTER (OUTPATIENT)
Age: 31
End: 2021-10-16

## 2021-10-18 ENCOUNTER — TELEPHONE (OUTPATIENT)
Dept: OBGYN | Facility: CLINIC | Age: 31
End: 2021-10-18

## 2021-10-18 NOTE — TELEPHONE ENCOUNTER
----- Message from KUSHAL Bettencourt CNM sent at 10/17/2021  2:04 PM CDT -----  Regarding: Call pt to notify her of lab error  Can you please call this patient to see if she is having any continued cramping or any pain with urination? The lab did the urine analysis (10/7), which did not look concerning for a UTI, but they accidentally discarded her urine instead of completing the culture as well. If she is having any symptoms she should come leave another sample.  Thanks,  Janessa

## 2021-10-18 NOTE — TELEPHONE ENCOUNTER
Tried to reach Yue, but received voicemail.  Left message to call back if ongoing symptoms for UC order.  Will need to re-collect if she is still having concerns.

## 2021-10-22 LAB
ABO (EXTERNAL): NORMAL
HEMOGLOBIN (EXTERNAL): 12.1 G/DL (ref ?–15.5)
HEPATITIS B SURFACE ANTIGEN (EXTERNAL): NONREACTIVE
HIV1+2 AB SERPL QL IA: NEGATIVE
PLATELET COUNT (EXTERNAL): 201 10E3/UL (ref 140–400)
RH (EXTERNAL): POSITIVE
RUBELLA ANTIBODY IGG (EXTERNAL): NORMAL
TREPONEMA PALLIDUM ANTIBODY (EXTERNAL): NONREACTIVE
URINE CULTURE OB NURSE INTERPRETATION (EXTERNAL RESULT): NEGATIVE
URINE CULTURE OB NURSE INTERPRETATION (EXTERNAL RESULT): NEGATIVE

## 2021-10-22 NOTE — PROGRESS NOTES
Records received from [facility] [phone].   Entered labs and sent to scanning

## 2021-10-28 ENCOUNTER — OFFICE VISIT (OUTPATIENT)
Dept: OBGYN | Facility: CLINIC | Age: 31
End: 2021-10-28
Attending: OBSTETRICS & GYNECOLOGY
Payer: COMMERCIAL

## 2021-10-28 VITALS
HEIGHT: 67 IN | SYSTOLIC BLOOD PRESSURE: 131 MMHG | BODY MASS INDEX: 34.51 KG/M2 | HEART RATE: 83 BPM | WEIGHT: 219.9 LBS | DIASTOLIC BLOOD PRESSURE: 82 MMHG

## 2021-10-28 DIAGNOSIS — R39.15 URINARY URGENCY: ICD-10-CM

## 2021-10-28 DIAGNOSIS — O09.92 HIGH-RISK PREGNANCY IN SECOND TRIMESTER: Primary | ICD-10-CM

## 2021-10-28 LAB
ALBUMIN UR-MCNC: 20 MG/DL
APPEARANCE UR: ABNORMAL
BACTERIA #/AREA URNS HPF: ABNORMAL /HPF
BILIRUB UR QL STRIP: NEGATIVE
CAOX CRY #/AREA URNS HPF: ABNORMAL /HPF
COLOR UR AUTO: YELLOW
GLUCOSE UR STRIP-MCNC: 30 MG/DL
HGB UR QL STRIP: NEGATIVE
KETONES UR STRIP-MCNC: NEGATIVE MG/DL
LEUKOCYTE ESTERASE UR QL STRIP: ABNORMAL
MUCOUS THREADS #/AREA URNS LPF: PRESENT /LPF
NITRATE UR QL: NEGATIVE
PH UR STRIP: 5.5 [PH] (ref 5–7)
RBC URINE: 7 /HPF
SP GR UR STRIP: 1.02 (ref 1–1.03)
SQUAMOUS EPITHELIAL: 15 /HPF
TRANSITIONAL EPI: 1 /HPF
UROBILINOGEN UR STRIP-MCNC: NORMAL MG/DL
WBC URINE: 25 /HPF

## 2021-10-28 PROCEDURE — G0463 HOSPITAL OUTPT CLINIC VISIT: HCPCS

## 2021-10-28 PROCEDURE — 81003 URINALYSIS AUTO W/O SCOPE: CPT | Performed by: OBSTETRICS & GYNECOLOGY

## 2021-10-28 PROCEDURE — 99214 OFFICE O/P EST MOD 30 MIN: CPT | Mod: GC | Performed by: OBSTETRICS & GYNECOLOGY

## 2021-10-28 PROCEDURE — 87086 URINE CULTURE/COLONY COUNT: CPT | Performed by: OBSTETRICS & GYNECOLOGY

## 2021-10-28 ASSESSMENT — MIFFLIN-ST. JEOR: SCORE: 1755.09

## 2021-10-28 NOTE — PROGRESS NOTES
"[facility]  [facility]   Return OB Visit    SUBJECTIVE      Started feeling round ligament pain about 3 weeks right > left side, describing this as a burning pain that radiates from her lower pubic area up the side of her stomach, most notable after you have she has been walking.      Also notes that since her cerclage placement at 20 weeks she has been leaking fluid every single time she goes to the bathroom. Feels like that has increased in recent weeks. Has been wearing panty liners every day due to how often this is occurring. Denies having any event that feels like her water has broken but unsure of what could be causing this. Denies any vaginal bleeding or spotting.    Reports her mood has improved significantly and feels like her anxiety is not as bad as several weeks ago. Appetite has been good. Denies any nausea or vomiting, or pre-eclampsia symptoms.  Reports good fetal movement.     She would like to sign federal tubal papers today. She reports her and her partner have discussed this decision       Pregnancy complicated by:  - Cervical insufficiency s/p Olivas Cerclage placement at 20w4d  - Anxiety/depression       OBJECTIVE   /82   Pulse 83   Ht 1.702 m (5' 7\")   Wt 99.7 kg (219 lb 14.4 oz)   LMP 2021   BMI 34.44 kg/m    Weight gain:     Gen: Well-appearing, NAD    Fundal Height:  30 cm  FHR: 150 bpm    SSE: Cervix closed, minimal white discharge. No bleeding, no tension viewed on stitch.     See OB Flowsheet for additional information     ASSESSMENT & PLAN   31 year old  at 30w0d  by 8w1d US, returns for routine OB visit  Today.    # Cervical Insufficiency s/p Olivas Cerclage at 20w4d  - speculum exam today given patient increased discharge. cerclage stitches visualized in correct location. Cervix closed  - Patient would like to have cerclage removed at 36w under spinal anesthesia, due to discomfort with exam in clinic- orders " placed  - provided information re: yuliya counts     # Satisfied Parity  - The permanent nature of the tubal ligation was discussed at length. The patient is certain that she wants no future children. The risks of the procedure were discussed, including: Regret, failure rate (approximately 1-2:1000 risk of pregnancy), increased risk of ectopic gestation should pregnancy occur, bleeding, infection, and injury to other organs. Other forms of non-permanent contraception (ie. IUD and implanon) and other methods for permanent sterilization (ie. Vasectomy, laparoscopic tubal ligation) were discussed. The patient was given time to ask questions and stated that they are certain they wanted to proceed with the procedure.   - Federal tubal papers were verified to be accurate and signed today (10/28/21).  - After discussing the procedure the patient was informed that if they felt uncertain about proceeding they could decline the procedure at any time.    # Anxiety/Depression   - Continue on 50mg Sertraline. Mood stable     # PNC:   - Its a Girl!   - Prenatal labs, Rh positive, Rubella immune, antibody negative, all other infectious labs wnl.  - Genetics:   - Imaging:Level II with suboptimal views of kidneys, repeat imaging has been wnl  - Immunizations: s/p flu (10/7/21)    # Delivery Planning  - Plans epidural for pain management during labor  - Feeding: plans on breast feeding  - Contraception: bilateral tubal ligation following delivery    Return to Clinic in 2 weeks    Staffed with Dr. Christina Noonan DO, MS  OBGYN Resident, PGY1  [facility]  10/28/2021 3:21 PM    Appreciate note by Dr. Noonan. Patient has been seen and examined by me with the resident, agree with above note.     Lorena Vasquez MD

## 2021-10-28 NOTE — LETTER
"10/28/2021       RE: Yue Teran  [address] [place] [address]     Dear Colleague,    Thank you for referring your patient, Yue Teran, to the [facility] [place] [facility]. Please see a copy of my visit note below.    [facility]  [facility]   Return OB Visit    SUBJECTIVE      Started feeling round ligament pain about 3 weeks right > left side, describing this as a burning pain that radiates from her lower pubic area up the side of her stomach, most notable after you have she has been walking.      Also notes that since her cerclage placement at 20 weeks she has been leaking fluid every single time she goes to the bathroom. Feels like that has increased in recent weeks. Has been wearing panty liners every day due to how often this is occurring. Denies having any event that feels like her water has broken but unsure of what could be causing this. Denies any vaginal bleeding or spotting.    Reports her mood has improved significantly and feels like her anxiety is not as bad as several weeks ago. Appetite has been good. Denies any nausea or vomiting, or pre-eclampsia symptoms.  Reports good fetal movement.     She would like to sign federal tubal papers today. She reports her and her partner have discussed this decision       Pregnancy complicated by:  - Cervical insufficiency s/p Olivas Cerclage placement at 20w4d  - Anxiety/depression       OBJECTIVE   /82   Pulse 83   Ht 1.702 m (5' 7\")   Wt 99.7 kg (219 lb 14.4 oz)   LMP 2021   BMI 34.44 kg/m    Weight gain:     Gen: Well-appearing, NAD    Fundal Height:  30 cm  FHR: 150 bpm    SSE: Cervix closed, minimal white discharge. No bleeding, no tension viewed on stitch.     See OB Flowsheet for additional information     ASSESSMENT & PLAN   31 year old  at 30w0d  by 8w1d US, returns for routine OB visit  Today.    # " Cervical Insufficiency s/p Olivas Cerclage at 20w4d  - speculum exam today given patient increased discharge. cerclage stitches visualized in correct location. Cervix closed  - Patient would like to have cerclage removed at 36w under spinal anesthesia, due to discomfort with exam in clinic- orders placed  - provided information re: yuliya morgan     # Satisfied Parity  - The permanent nature of the tubal ligation was discussed at length. The patient is certain that she wants no future children. The risks of the procedure were discussed, including: Regret, failure rate (approximately 1-2:1000 risk of pregnancy), increased risk of ectopic gestation should pregnancy occur, bleeding, infection, and injury to other organs. Other forms of non-permanent contraception (ie. IUD and implanon) and other methods for permanent sterilization (ie. Vasectomy, laparoscopic tubal ligation) were discussed. The patient was given time to ask questions and stated that they are certain they wanted to proceed with the procedure.   - Federal tubal papers were verified to be accurate and signed today (10/28/21).  - After discussing the procedure the patient was informed that if they felt uncertain about proceeding they could decline the procedure at any time.    # Anxiety/Depression   - Continue on 50mg Sertraline. Mood stable     # PNC:   - Its a Girl!   - Prenatal labs, Rh positive, Rubella immune, antibody negative, all other infectious labs wnl.  - Genetics:   - Imaging:Level II with suboptimal views of kidneys, repeat imaging has been wnl  - Immunizations: s/p flu (10/7/21)    # Delivery Planning  - Plans epidural for pain management during labor  - Feeding: plans on breast feeding  - Contraception: bilateral tubal ligation following delivery    Return to Clinic in 2 weeks    Staffed with Dr. Christina Noonan DO, MS  OBGYN Resident, PGY1  [facility]  10/28/2021 3:21 PM    Appreciate note by   Shaista. Patient has been seen and examined by me with the resident, agree with above note.     Lorena Vasquez MD

## 2021-10-29 LAB — BACTERIA UR CULT: NORMAL

## 2021-11-04 ENCOUNTER — TELEPHONE (OUTPATIENT)
Dept: OBGYN | Facility: CLINIC | Age: 31
End: 2021-11-04

## 2021-11-04 DIAGNOSIS — Z11.52 ENCOUNTER FOR SCREENING FOR COVID-19: Primary | ICD-10-CM

## 2021-11-04 NOTE — TELEPHONE ENCOUNTER
Confirmed surgery date, time and location (L&D), arrival time at 10:30a.m with nothing to eat eight hours before scheduled surgery time, clear liquids up to two hours before, COVID testing 96 hours prior.

## 2021-11-04 NOTE — TELEPHONE ENCOUNTER
----- Message from Edson Toribio sent at 11/4/2021  1:56 PM CDT -----  Regarding: COVID order  Please place COVID order, patient scheduled for surgery 12/9/21 with Dr. Lurdes Thao.  Thanks.

## 2021-11-11 ENCOUNTER — OFFICE VISIT (OUTPATIENT)
Dept: OBGYN | Facility: CLINIC | Age: 31
End: 2021-11-11
Attending: MIDWIFE
Payer: COMMERCIAL

## 2021-11-11 VITALS
DIASTOLIC BLOOD PRESSURE: 80 MMHG | HEART RATE: 109 BPM | BODY MASS INDEX: 35.4 KG/M2 | WEIGHT: 226 LBS | SYSTOLIC BLOOD PRESSURE: 136 MMHG

## 2021-11-11 DIAGNOSIS — O09.93 HIGH-RISK PREGNANCY IN THIRD TRIMESTER: Primary | ICD-10-CM

## 2021-11-11 DIAGNOSIS — O34.30 CERVICAL INSUFFICIENCY DURING PREGNANCY, ANTEPARTUM: ICD-10-CM

## 2021-11-11 PROCEDURE — 99213 OFFICE O/P EST LOW 20 MIN: CPT | Performed by: MIDWIFE

## 2021-11-11 PROCEDURE — G0463 HOSPITAL OUTPT CLINIC VISIT: HCPCS

## 2021-11-11 ASSESSMENT — PAIN SCALES - GENERAL: PAINLEVEL: NO PAIN (0)

## 2021-11-11 NOTE — PROGRESS NOTES
Subjective:      31 year old  at 32w0d presentst for a routine prenatal appointment.    No vaginal bleeding or leakage of fluid.  No contractions. Good fetal movement.       No HA, visual changes, RUQ or epigastric pain.   Patient concerns: Feeling well overall.  - Swelling in feet +2 and +1 ankles. Reviewed compression socks, elevating feet, hydration, and movement.        - Pt feeling concerned about PTL with cerclage in place. Reviewed signs of  labor in detail.   - Reviewed EOB labs with patient.   - Cerclage removal scheduled for 21  - TDAP previously received.    Objective:  Vitals:    21 1516   BP: 136/80   Pulse: 109   Weight: 102.5 kg (226 lb)   , see ob flowsheet    Assessment/Plan     Encounter Diagnoses   Name Primary?     High-risk pregnancy in third trimester Yes     Cervical insufficiency during pregnancy, antepartum      No orders of the defined types were placed in this encounter.    Orders Placed This Encounter   Medications     sertraline (ZOLOFT) 50 MG tablet     B positive  Antibody Screen   Date Value Ref Range Status   2021 Negative Negative Final   Rhogam was not given.      - Reviewed total weight gain, encouraged continued healthy diet and exercise.    - Reviewed importance of daily fetal kick count and why/how to contact provider.  - Reviewed why/how to contact provider if headache/visual changes/RUQ or epigastric pain, decreased fetal movement, vaginal bleeding, leakage of fluid or more than 4 contractions in an hour.  - Reinforced COVID 19 recommendations including covid vaccine, social distancing, hand hygiene, report any known or suspected exposure or s/s promptly. Reviewed most common symptoms of fever, cough, and shortness of breath. Discussed hospital restrictions- limit to 2 visitor, visitor must remain in room and can not switch visitors. Doulas are excluded from this restriction.      Patient education/orders or handouts today:  PTL  signs/symptoms  Plan for GBS screening at 36-37 wks.    Next visit in 2 weeks   Call prn if questions or concerns.     KUSHAL Bettencourt, MALUM

## 2021-11-11 NOTE — LETTER
2021       RE: Yue Teran  [address] [place] [address]     Dear Colleague,    Thank you for referring your patient, Yue Teran, to the [facility] [place] at [facility]. Please see a copy of my visit note below.    Subjective:      31 year old  at 32w0d presentst for a routine prenatal appointment.    No vaginal bleeding or leakage of fluid.  No contractions. Good fetal movement.       No HA, visual changes, RUQ or epigastric pain.   Patient concerns: Feeling well overall.  - Swelling in feet +2 and +1 ankles. Reviewed compression socks, elevating feet, hydration, and movement.        - Pt feeling concerned about PTL with cerclage in place. Reviewed signs of  labor in detail.   - Reviewed EOB labs with patient.   - Cerclage removal scheduled for 21  - TDAP previously received.    Objective:  Vitals:    21 1516   BP: 136/80   Pulse: 109   Weight: 102.5 kg (226 lb)   , see ob flowsheet    Assessment/Plan     Encounter Diagnoses   Name Primary?     High-risk pregnancy in third trimester Yes     Cervical insufficiency during pregnancy, antepartum      No orders of the defined types were placed in this encounter.    Orders Placed This Encounter   Medications     sertraline (ZOLOFT) 50 MG tablet     B positive  Antibody Screen   Date Value Ref Range Status   2021 Negative Negative Final   Rhogam was not given.      - Reviewed total weight gain, encouraged continued healthy diet and exercise.    - Reviewed importance of daily fetal kick count and why/how to contact provider.  - Reviewed why/how to contact provider if headache/visual changes/RUQ or epigastric pain, decreased fetal movement, vaginal bleeding, leakage of fluid or more than 4 contractions in an hour.  - Reinforced COVID 19 recommendations including covid vaccine, social distancing, hand hygiene, report any known or suspected exposure or s/s  promptly. Reviewed most common symptoms of fever, cough, and shortness of breath. Discussed hospital restrictions- limit to 2 visitor, visitor must remain in room and can not switch visitors. Doulas are excluded from this restriction.      Patient education/orders or handouts today:  PTL signs/symptoms  Plan for GBS screening at 36-37 wks.    Next visit in 2 weeks   Call prn if questions or concerns.     KUSHAL Bettencourt, MALUM

## 2021-11-13 DIAGNOSIS — Z11.59 ENCOUNTER FOR SCREENING FOR OTHER VIRAL DISEASES: ICD-10-CM

## 2021-11-23 ENCOUNTER — OFFICE VISIT (OUTPATIENT)
Dept: OBGYN | Facility: CLINIC | Age: 31
End: 2021-11-23
Attending: MIDWIFE
Payer: COMMERCIAL

## 2021-11-23 VITALS
BODY MASS INDEX: 35.87 KG/M2 | WEIGHT: 229 LBS | DIASTOLIC BLOOD PRESSURE: 83 MMHG | HEART RATE: 71 BPM | SYSTOLIC BLOOD PRESSURE: 124 MMHG

## 2021-11-23 DIAGNOSIS — O09.93 HIGH-RISK PREGNANCY IN THIRD TRIMESTER: Primary | ICD-10-CM

## 2021-11-23 PROCEDURE — 99214 OFFICE O/P EST MOD 30 MIN: CPT | Performed by: OBSTETRICS & GYNECOLOGY

## 2021-11-23 ASSESSMENT — PAIN SCALES - GENERAL: PAINLEVEL: MILD PAIN (3)

## 2021-11-23 NOTE — NURSING NOTE
Chief Complaint   Patient presents with     Prenatal Care     33 weeks 5 days     Needs refill on Zoloft

## 2021-11-23 NOTE — PROGRESS NOTES
[facility]  [facility]  Return OB Visit     SUBJECTIVE     31 year old  at 33w5d persenting for routine prenatal appointment. She has a Olivas cerclage and complains about feeling increasing pelvic pressure and pain at the site of stiches over the past 2 weeks. Denies any recent fall or trauma. Has ongoing vaginal discharge which has been present since cerclage placement.  Notes intermittent contractions, at times every 15 minutes. Reports good fetal movement. Denies vaginal bleeding.    Since her last clinic visit, her  has also noticed that she appears to be having some sleep apnea reporting that she has periods of time at night where it appears that she stops breathing/gasping for air. She has been sleeping poorly for 5-6 hrs per night. She reports CAMARILLO but no shortness of breath at rest.     Pregnancy notable for  - cervical insufficiency with Olivas cerclage x2 placed at 20w4d  - Anxiety/Depression    OBJECTIVE   /83   Pulse 71   Wt 103.9 kg (229 lb)   LMP 2021   BMI 35.87 kg/m      Weight gain:   Wt Readings from Last 5 Encounters:   21 103.9 kg (229 lb)   21 102.5 kg (226 lb)   10/28/21 99.7 kg (219 lb 14.4 oz)   10/07/21 97.1 kg (214 lb)   21 92.3 kg (203 lb 8 oz)     Gen: Well-appearing, NAD  Cervix: On speculum examination: Cerclage in place, sutures intact, no signs of tension on suture or cervical trauma. Vaginal exam confirms closed cervix.     Fundal Height:  33 cm  FHR: 143    Labs:   Microscopic wet-mount exam shows lactobacilli and squamous cells.      See OB Flowsheet for additional information     ASSESSMENT & PLAN   31 year old  at 33w5d LMP by 8w1d, PAU. She is here today with worsening cervical pain associated with cerclage.     # Cervical Insufficiency s/p Olivas Cerclage at 20w4d  - Exam today  Without evidence of tension on cerclage. Cervix is closed. Wet prep with lactobacilli and  squamous cells  -  Cerclage removal scheduled for 12/9/21a  - Provided information on indications for returning to clinic, including bleeding and increased pain  - Has removal scheduled in OR at 36 weeks    # Anxiety/ Depression  -  Sertraline 50mg medication refill provided today    # Satisfied parity  - FTP signed 10/28/21    # PNC:   - Routine labs reviewed and discussed with patient.   - Cerclage removal scheduled for 12/9  - GBS to be collected at time of cerclage removal    - Prenatal labs, Rh positive, Rubella immune, antibody negative, all other infectious labs wnl.  - Genetics:   - Imaging: Level II with sub optimal views - resolved on repeat imaging  - Immunizations:  s/p flu (10/7/21), s/p Tdap (8/23/21)    # Musculoskeletal pains of pregnancy   - Discussed with patient options for pain management including trial of Hydroxyzine.    # Delivery Planning  - Plans epidural for pain management during labor  - Feeding: plans on breast feeding  - Contraception: bilateral tubal ligation following delivery (FTP signed 10/28/21)    #Concern for Sleep Apnea   - Discussed plan to refer patient to sleep center    Return to clinic in on 12/9 for cerclage removal. Discussed Return Precautions    Staffed with Dr. Christina Orantes, MS3    Physician Attestation   I, Lorena Vasquez, was present with the medical/[place] student who participated in the service and in the documentation of the note.  I have verified the history and personally performed the physical exam and medical decision making.  I agree with the assessment and plan of care as documented in the note.      Items personally reviewed: vitals.    Lorena Vasquez MD

## 2021-11-23 NOTE — LETTER
2021       RE: Yue Teran  [address] [place] [address]     Dear Colleague,    Thank you for referring your patient, Yue Teran, to the [facility] [place] at [facility]. Please see a copy of my visit note below.    [facility]  [facility]  Return OB Visit     SUBJECTIVE     31 year old  at 33w5d persenting for routine prenatal appointment. She has a Olivas cerclage and complains about feeling increasing pelvic pressure and pain at the site of stiches over the past 2 weeks. Denies any recent fall or trauma. Has ongoing vaginal discharge which has been present since cerclage placement.  Notes intermittent contractions, at times every 15 minutes. Reports good fetal movement. Denies vaginal bleeding.    Since her last clinic visit, her  has also noticed that she appears to be having some sleep apnea reporting that she has periods of time at night where it appears that she stops breathing/gasping for air. She has been sleeping poorly for 5-6 hrs per night. She reports CAMARILLO but no shortness of breath at rest.     Pregnancy notable for  - cervical insufficiency with Olivas cerclage x2 placed at 20w4d  - Anxiety/Depression    OBJECTIVE   /83   Pulse 71   Wt 103.9 kg (229 lb)   LMP 2021   BMI 35.87 kg/m      Weight gain:   Wt Readings from Last 5 Encounters:   21 103.9 kg (229 lb)   21 102.5 kg (226 lb)   10/28/21 99.7 kg (219 lb 14.4 oz)   10/07/21 97.1 kg (214 lb)   21 92.3 kg (203 lb 8 oz)     Gen: Well-appearing, NAD  Cervix: On speculum examination: Cerclage in place, sutures intact, no signs of tension on suture or cervical trauma. Vaginal exam confirms closed cervix.     Fundal Height:  33 cm  FHR: 143    Labs:   Microscopic wet-mount exam shows lactobacilli and squamous cells.      See OB Flowsheet for additional information      ASSESSMENT & PLAN   31 year old  at 33w5d LMP by 8w1d, PAU. She is here today with worsening cervical pain associated with cerclage.     # Cervical Insufficiency s/p Olivas Cerclage at 20w4d  - Exam today  Without evidence of tension on cerclage. Cervix is closed. Wet prep with lactobacilli and squamous cells  -  Cerclage removal scheduled for 21a  - Provided information on indications for returning to clinic, including bleeding and increased pain  - Has removal scheduled in OR at 36 weeks    # Anxiety/ Depression  -  Sertraline 50mg medication refill provided today    # Satisfied parity  - FTP signed 10/28/21    # PNC:   - Routine labs reviewed and discussed with patient.   - Cerclage removal scheduled for   - GBS to be collected at time of cerclage removal    - Prenatal labs, Rh positive, Rubella immune, antibody negative, all other infectious labs wnl.  - Genetics:   - Imaging: Level II with sub optimal views - resolved on repeat imaging  - Immunizations:  s/p flu (10/7/21), s/p Tdap (21)    # Musculoskeletal pains of pregnancy   - Discussed with patient options for pain management including trial of Hydroxyzine.    # Delivery Planning  - Plans epidural for pain management during labor  - Feeding: plans on breast feeding  - Contraception: bilateral tubal ligation following delivery (FTP signed 10/28/21)    #Concern for Sleep Apnea   - Discussed plan to refer patient to sleep center    Return to clinic in on  for cerclage removal. Discussed Return Precautions    Staffed with Dr. Christina Orantes, MS3    Physician Attestation   I, Lorena Vasquez, was present with the medical/[place] student who participated in the service and in the documentation of the note.  I have verified the history and personally performed the physical exam and medical decision making.  I agree with the assessment and plan of care as documented in the note.      Items personally reviewed:  kingas.    Lorena Vasquez MD

## 2021-12-04 ENCOUNTER — HOSPITAL ENCOUNTER (OUTPATIENT)
Facility: CLINIC | Age: 31
Discharge: HOME | End: 2021-12-04
Attending: ADVANCED PRACTICE MIDWIFE | Admitting: ADVANCED PRACTICE MIDWIFE
Payer: COMMERCIAL

## 2021-12-04 VITALS
HEART RATE: 78 BPM | HEIGHT: 67 IN | WEIGHT: 223 LBS | SYSTOLIC BLOOD PRESSURE: 140 MMHG | TEMPERATURE: 98.2 F | BODY MASS INDEX: 35 KG/M2 | DIASTOLIC BLOOD PRESSURE: 81 MMHG | RESPIRATION RATE: 18 BRPM

## 2021-12-04 LAB
ALBUMIN SERPL-MCNC: 2.4 G/DL (ref 3.4–5)
ALBUMIN UR-MCNC: 20 MG/DL
ALP SERPL-CCNC: 166 U/L (ref 40–150)
ALT SERPL W P-5'-P-CCNC: 26 U/L (ref 0–50)
ANION GAP SERPL CALCULATED.3IONS-SCNC: 10 MMOL/L (ref 3–14)
APPEARANCE UR: ABNORMAL
AST SERPL W P-5'-P-CCNC: 28 U/L (ref 0–45)
BACTERIA #/AREA URNS HPF: ABNORMAL /HPF
BILIRUB SERPL-MCNC: 0.2 MG/DL (ref 0.2–1.3)
BILIRUB UR QL STRIP: NEGATIVE
BUN SERPL-MCNC: 11 MG/DL (ref 7–30)
CALCIUM SERPL-MCNC: 9.3 MG/DL (ref 8.5–10.1)
CAOX CRY #/AREA URNS HPF: ABNORMAL /HPF
CHLORIDE BLD-SCNC: 106 MMOL/L (ref 94–109)
CO2 SERPL-SCNC: 22 MMOL/L (ref 20–32)
COLOR UR AUTO: YELLOW
CREAT SERPL-MCNC: 0.6 MG/DL (ref 0.52–1.04)
CREAT UR-MCNC: 216 MG/DL
ERYTHROCYTE [DISTWIDTH] IN BLOOD BY AUTOMATED COUNT: 13.3 % (ref 10–15)
GFR SERPL CREATININE-BSD FRML MDRD: >90 ML/MIN/1.73M2
GLUCOSE BLD-MCNC: 94 MG/DL (ref 70–99)
GLUCOSE UR STRIP-MCNC: NEGATIVE MG/DL
HCT VFR BLD AUTO: 35 % (ref 35–47)
HGB BLD-MCNC: 11.9 G/DL (ref 11.7–15.7)
HGB UR QL STRIP: NEGATIVE
HOLD SPECIMEN: NORMAL
HOLD SPECIMEN: NORMAL
KETONES UR STRIP-MCNC: NEGATIVE MG/DL
LEUKOCYTE ESTERASE UR QL STRIP: NEGATIVE
MCH RBC QN AUTO: 30.1 PG (ref 26.5–33)
MCHC RBC AUTO-ENTMCNC: 34 G/DL (ref 31.5–36.5)
MCV RBC AUTO: 88 FL (ref 78–100)
MUCOUS THREADS #/AREA URNS LPF: PRESENT /LPF
NITRATE UR QL: NEGATIVE
PH UR STRIP: 5.5 [PH] (ref 5–7)
PLATELET # BLD AUTO: 151 10E3/UL (ref 150–450)
POTASSIUM BLD-SCNC: 3.4 MMOL/L (ref 3.4–5.3)
PROT SERPL-MCNC: 6 G/DL (ref 6.8–8.8)
PROT UR-MCNC: 0.25 G/L
PROT/CREAT 24H UR: 0.12 G/G CR (ref 0–0.2)
RBC # BLD AUTO: 3.96 10E6/UL (ref 3.8–5.2)
RBC URINE: 0 /HPF
SODIUM SERPL-SCNC: 138 MMOL/L (ref 133–144)
SP GR UR STRIP: 1.03 (ref 1–1.03)
SQUAMOUS EPITHELIAL: 7 /HPF
UROBILINOGEN UR STRIP-MCNC: NORMAL MG/DL
WBC # BLD AUTO: 10.6 10E3/UL (ref 4–11)
WBC URINE: 4 /HPF

## 2021-12-04 PROCEDURE — 96360 HYDRATION IV INFUSION INIT: CPT

## 2021-12-04 PROCEDURE — 85027 COMPLETE CBC AUTOMATED: CPT | Performed by: STUDENT IN AN ORGANIZED HEALTH CARE EDUCATION/TRAINING PROGRAM

## 2021-12-04 PROCEDURE — 96361 HYDRATE IV INFUSION ADD-ON: CPT

## 2021-12-04 PROCEDURE — 84156 ASSAY OF PROTEIN URINE: CPT | Performed by: STUDENT IN AN ORGANIZED HEALTH CARE EDUCATION/TRAINING PROGRAM

## 2021-12-04 PROCEDURE — G0463 HOSPITAL OUTPT CLINIC VISIT: HCPCS | Mod: 25

## 2021-12-04 PROCEDURE — 81001 URINALYSIS AUTO W/SCOPE: CPT | Performed by: STUDENT IN AN ORGANIZED HEALTH CARE EDUCATION/TRAINING PROGRAM

## 2021-12-04 PROCEDURE — 258N000003 HC RX IP 258 OP 636: Performed by: STUDENT IN AN ORGANIZED HEALTH CARE EDUCATION/TRAINING PROGRAM

## 2021-12-04 PROCEDURE — 59025 FETAL NON-STRESS TEST: CPT

## 2021-12-04 PROCEDURE — 80053 COMPREHEN METABOLIC PANEL: CPT | Performed by: STUDENT IN AN ORGANIZED HEALTH CARE EDUCATION/TRAINING PROGRAM

## 2021-12-04 RX ADMIN — SODIUM CHLORIDE, POTASSIUM CHLORIDE, SODIUM LACTATE AND CALCIUM CHLORIDE 1000 ML: 600; 310; 30; 20 INJECTION, SOLUTION INTRAVENOUS at 02:26

## 2021-12-04 ASSESSMENT — MIFFLIN-ST. JEOR: SCORE: 1769.15

## 2021-12-04 NOTE — PROGRESS NOTES
Data: [facility] the [facility] at 0113.   Reason for maternal/fetal assessment per patient is Contractions  . Patient is a . Prenatal record reviewed.      OB History    Para Term  AB Living   1 0 0 0 0 0   SAB IAB Ectopic Multiple Live Births   0 0 0 0 0      # Outcome Date GA Lbr Andres/2nd Weight Sex Delivery Anes PTL Lv   1 Current               Medical History:   Past Medical History:   Diagnosis Date     Anxiety      Depressive disorder    . Gestational Age 35w2d. VSS. Cervix: closed.  Fetal movement present. Patient denies backache, vaginal discharge,UTI symptoms, GI problems, bloody show, vaginal bleeding, edema, headache, visual disturbances, epigastric or URQ pain, rupture of membranes. Support person is present.  Action: Verbal consent for EFM. Triage assessment completed.  Fetal assessment: Presumed adequate fetal oxygenation documented (see flow record). Patient education pamphlets given. Patient instructed to report change in fetal movement, vaginal leaking of fluid or bleeding, abdominal pain, or any concerns related to the pregnancy to her nurse/physician.   Response: Dr. Morris informed of arrival and complaints. Plan per provider is discharge home. Patient verbalized understanding of education and verbalized agreement with plan. Discharged ambulatory at 0430.    IV hydration for contractions.  IV start time: 0225  IV stop time: 0400

## 2021-12-04 NOTE — DISCHARGE INSTRUCTIONS
Discharge Instruction for Undelivered Patients      You were seen for: Labor Assessment    Diet:   Drink 8 to 12 glasses of liquids (milk, juice, water) every day.     Activity:  Call your doctor or nurse midwife if your baby is moving less than usual.     Call your provider if you notice:  Swelling in your face or increased swelling in your hands or legs.  Headaches that are not relieved by Tylenol (acetaminophen).  Changes in your vision (blurring: seeing spots or stars.)  Nausea (sick to your stomach) and vomiting (throwing up).   Weight gain of 5 pounds or more per week.  Heartburn that doesn't go away.  Signs of bladder infection: pain when you urinate (use the toilet), need to go more often and more urgently.  The bag of benjamin (rupture of membranes) breaks, or you notice leaking in your underwear.  Bright red blood in your underwear.  Abdominal (lower belly) or stomach pain.  For first baby: Contractions (tightening) less than 5 minutes apart for one hour or more.  Second (plus) baby: Contractions (tightening) less than 10 minutes apart and getting stronger.      Follow-up:  As scheduled in the clinic

## 2021-12-04 NOTE — PROGRESS NOTES
"[facility] Obstetrics Triage Note  DOS: 2021      HPI:  Yue Teran is a 31 year old  at 35w2d presenting to triage with a chief complaint of pelvic pressure. Has moved around at work a lot more than usual past 2 days, also not sleeping well. Contractions were worse before arrival, 6 in 10 mins at peak. She has been feeling cerclage pressure over past several weeks.      Endorses good fetal movement.  She states that she has otherwise been feeling well.   She denies fever, N/V, chest pain, SOB, abdominal pain, vaginal bleeding, foul smelling or green/yellow vaginal discharge, dysuria.  Pregnancy otherwise complicated by:  - gonzalez cerclage in place  - desires permanent sterilization  - anxiety/depression    ROS:  Negative except as mentioned in HPI.    PMH:  Past Medical History:   Diagnosis Date     Anxiety      Depressive disorder        PSH:  Past Surgical History:   Procedure Laterality Date     APPENDECTOMY  2011     CERCLAGE CERVICAL N/A 2021    Procedure: CERCLAGE, CERVIX, VAGINAL APPROACH;  Surgeon: Miladys Contreras MD;  Location: [facility] L+D     CERCLAGE CERVICAL  2021     TONSILLECTOMY  2009     TONSILLECTOMY       WISDOM TOOTH EXTRACTION         Medications:  Current Outpatient Medications   Medication Instructions     docusate sodium (COLACE) 100 mg, Oral, DAILY     Prenatal Vit-Fe Fumarate-FA (PRENATAL MULTIVITAMIN W/IRON) 27-0.8 MG tablet 1 tablet, Oral, DAILY     sertraline (ZOLOFT) 50 mg, Oral, DAILY         Allergies:     Allergies   Allergen Reactions     Egg [Egg Shells] Unknown     Amoxicillin Rash     Cefzil [Cefprozil] Rash       Physical Exam:   Vitals:    21 0127 21 0135   BP: (!) 153/90 (!) 140/81   BP Location: Right arm    Pulse: 78    Resp: 18    Temp: 98.2  F (36.8  C)    TempSrc: Oral    Weight: 101.2 kg (223 lb)    Height: 1.702 m (5' 7\")         Gen:  resting comfortably, in NAD  CV: Regular rate, well-perfused  Pulm: nonlabored on room air, normal " inspiratory effort  Abd:  soft, gravid, non-tender, non-distended    SVE: closed, no tension on cerclage    FHT: Baseline 125, mod variability, present accels, absent decels  [place]:  1-2 contractions in 10 mins    Labs: Results for YUE TERAN (MRN [id number]) as of 2021 04:21  Sodium  138   Potassium  3.4   Chloride  106   Carbon Dioxide  22   Urea Nitrogen  11   Creatinine  0.60   GFR Estimate  >90   Calcium  9.3   Anion Gap  10   Albumin  2.4 (L)   Protein Total  6.0 (L)   Bilirubin Total  0.2   Alkaline Phosphatase  166 (H)   ALT  26   AST  28   Creatinine Urine 216    Protein Random Urine 0.25    Protein Total Urine g/gr Creatinine 0.12    Glucose  94   WBC  10.6   Hemoglobin  11.9   Hematocrit  35.0   Platelet Count  151   RBC Count  3.96   MCV  88   MCH  30.1   MCHC  34.0   RDW  13.3   Color Urine Yellow    Appearance Urine Slightly Cloudy (A)    Glucose Urine Negative    Bilirubin Urine Negative    Ketones Urine Negative    Specific Gravity Urine 1.027    pH Urine 5.5    Protein Albumin Urine 20 (A)    Urobilinogen mg/dL Normal    Nitrite Urine Negative    Blood Urine Negative    Leukocyte Esterase Urine Negative    WBC Urine 4    RBC Urine 0    Bacteria Urine Many (A)    Squamous Epithelial /HPF Urine 7 (H)    Mucus Urine Present (A)    Calcium Oxalate Many (A)        Assessment and Plan: Yue Teran is a 31 year old female  at 35w2d by 8w1d US not c/w LMP, presenting for rule out  labor, Olivas cerclage in place. Elevated blood pressures on arrival, asymptomatic.    Rule out  labor:  -SVE: closed, no tension on cerclage palpated. Declined repeat exam  -Contractions: 1-2 in 10 on toco, improved with fluids per patient  -Membranes: intact  -UA neg for nitrites, neg for leukocyte esterase    Elevated BP< 4 hours apart  -HELLP wnl, UPC 0.12  -asymptomatic  -f/u in clinic    Fetal well being:  -Appropriate for gestational age, reactive and reassuring    Prenatal care:  -Rh pos, GBS  unknown with plan to collect at time of cerclage removal, rubella immune, placenta posterior, failed GCT passed GTT    Dispo: to home with follow up for cerclage removal on 12/9, with appropriate return precautions discussed    Patient discussed with Dr Otero who is in agreement with plan    Amparo Morris MD  Obstetrics and Gynecology PGY-3  4:19 AM 12/4/2021      The patient was reviewed with Dr. Morris.  I agree with the above assessment and plan of care.     Janessa Otero MD, FACOG

## 2021-12-06 ENCOUNTER — HOSPITAL ENCOUNTER (OUTPATIENT)
Facility: CLINIC | Age: 31
Discharge: HOME | End: 2021-12-06
Attending: MIDWIFE | Admitting: MIDWIFE
Payer: COMMERCIAL

## 2021-12-06 ENCOUNTER — TELEPHONE (OUTPATIENT)
Dept: OBGYN | Facility: CLINIC | Age: 31
End: 2021-12-06
Payer: COMMERCIAL

## 2021-12-06 VITALS
DIASTOLIC BLOOD PRESSURE: 81 MMHG | TEMPERATURE: 98.2 F | HEART RATE: 75 BPM | RESPIRATION RATE: 18 BRPM | SYSTOLIC BLOOD PRESSURE: 135 MMHG

## 2021-12-06 PROBLEM — Z36.89 ENCOUNTER FOR TRIAGE IN PREGNANT PATIENT: Status: ACTIVE | Noted: 2021-12-06

## 2021-12-06 LAB — RUPTURE OF FETAL MEMBRANES BY ROM PLUS: NEGATIVE

## 2021-12-06 PROCEDURE — 84112 EVAL AMNIOTIC FLUID PROTEIN: CPT | Performed by: MIDWIFE

## 2021-12-06 PROCEDURE — 59025 FETAL NON-STRESS TEST: CPT

## 2021-12-06 PROCEDURE — G0463 HOSPITAL OUTPT CLINIC VISIT: HCPCS

## 2021-12-06 PROCEDURE — 999N000105 HC STATISTIC NO DOCUMENTATION TO SUPPORT CHARGE

## 2021-12-06 RX ORDER — LIDOCAINE 40 MG/G
CREAM TOPICAL
Status: DISCONTINUED | OUTPATIENT
Start: 2021-12-06 | End: 2021-12-06 | Stop reason: HOSPADM

## 2021-12-06 NOTE — TELEPHONE ENCOUNTER
----- Message from Vibha Issa RN sent at 2021  1:28 PM CST -----  Regardin+4 cerclage - contrx & lost mucous plug

## 2021-12-06 NOTE — TELEPHONE ENCOUNTER
Called pt to discuss s/s.  Pt states that she was see in the birthplace on Friday and given a liter of fluid.  Pt has been having contractions since being discharged.  Pt had 2 or 3 contractions that woke her from pain last night.  Pt has had minimal contractions today but the few are more painful.  This writer discussed with Dr Hahn.  Dr Hahn requested that the pt go in to the birthplace.  Pt was informed to go to the birthplace.  Pt agreed to the plan and would be getting to Birthplace around 6 pm. Discussed with Yue - ritesh RN at [facility].

## 2021-12-07 ENCOUNTER — MYC MEDICAL ADVICE (OUTPATIENT)
Dept: OBGYN | Facility: CLINIC | Age: 31
End: 2021-12-07
Payer: COMMERCIAL

## 2021-12-07 NOTE — PLAN OF CARE
Data: Patient presented to the [facility]place at 1801.   Reason for maternal/fetal assessment per patient is  Labor  . Patient is a . Prenatal record reviewed.      OB History    Para Term  AB Living   1 0 0 0 0 0   SAB IAB Ectopic Multiple Live Births   0 0 0 0 0      # Outcome Date GA Lbr Andres/2nd Weight Sex Delivery Anes PTL Lv   1 Current               Medical History:   Past Medical History:   Diagnosis Date     Anxiety      Depressive disorder    . Gestational Age 35w4d. VSS. Cervix: closed.  Fetal movement present. Patient denies cramping, backache, vaginal discharge, pelvic pressure, UTI symptoms, GI problems, bloody show, vaginal bleeding, edema, headache, visual disturbances, epigastric or URQ pain, abdominal pain, rupture of membranes. Support person present.  Action: Verbal consent for EFM. Triage assessment completed. EFM applied for NST. Uterine assessment irregular. Fetal assessment: Presumed adequate fetal oxygenation documented (see flow record). Patient education pamphlets given on fetal movement counts and ROM. Patient instructed to report change in fetal movement, vaginal leaking of fluid or bleeding, abdominal pain, or any concerns related to the pregnancy to her nurse/physician.   Response: Dr. Rico informed of arrival and pt status. Plan per provider is discharge after negative speculum and ROM+. Patient verbalized understanding of education and verbalized agreement with plan. Discharged ambulatory at .

## 2021-12-07 NOTE — PROGRESS NOTES
Obstetrics Triage Note    Yue Teran  : 1990  MRN: [id number]    CC: Contractions, leakage of fluid    HPI:  Yue Teran is a 31 year old  female at 35w4d by US who presents for evaluation of possible pre-term labor. She has had a Olivas cerclage in place since 20w4d. Patient states that she has had low level vaginal fluid leakage for an extended time during her pregnancy, but that for the last couple of days it has seemed to significantly increase. Yesterday, she had two instances of fluid leakage following urination that she estimates to be about 0.5 cup in volume. Possible that it was urine, though patient had already urinated and the leakage felt different than urination to her. Since these 2 episodes, patient has had fairly continuous but low-volume leakage requiring constant use of pad. Patient additionally believes that she lost her mucous plug yesterday afternoon and that her water broke. Patient reports contractions in increasing frequency since Friday. These have been between 40-90 seconds and they are present both with rest and activity. Denies vaginal bleeding. Reports + fetal movement.Patient having headaches that have been worse this past week, possibly 2/2 to dehydration. No changes in vision, RUQ pain or worsening edema. She denies fevers/chills, nausea/vomiting, shortness or breath, chest pain, dysuria or other systemic symptoms. Patient does not report any other complications with her pregnancy other than those listed above.     Pregnancy is complicated by:  - olivas cerclage in place () - plans removal  in OR  - desires permanent sterilization  - anxiety/depression - zoloft    OB History    Para Term  AB Living   1 0 0 0 0 0   SAB IAB Ectopic Multiple Live Births   0 0 0 0 0      # Outcome Date GA Lbr Andres/2nd Weight Sex Delivery Anes PTL Lv   1 Current                ROS:  Negative except as mentioned in HPI.    PMH:  Past Medical History:   Diagnosis  Date    Anxiety     Depressive disorder        PSHx:  Past Surgical History:   Procedure Laterality Date    APPENDECTOMY  2011    CERCLAGE CERVICAL N/A 8/23/2021    Procedure: CERCLAGE, CERVIX, VAGINAL APPROACH;  Surgeon: Miladys Contreras MD;  [facility]: [facility] L[facility]    CERCLAGE CERVICAL  08/23/2021    TONSILLECTOMY  2009    TONSILLECTOMY      WISDOM TOOTH EXTRACTION  2008       Medications:  Medications Prior to Admission   Medication Sig Dispense Refill Last Dose    docusate sodium (COLACE) 100 MG capsule Take 100 mg by mouth daily   12/5/2021 at 2300    Prenatal Vit-Fe Fumarate-FA (PRENATAL MULTIVITAMIN W/IRON) 27-0.8 MG tablet Take 1 tablet by mouth daily   12/5/2021 at 2300    sertraline (ZOLOFT) 50 MG tablet Take 1 tablet (50 mg) by mouth daily 90 tablet 3 12/5/2021 at 2300       Allergies:  Allergies   Allergen Reactions    Egg [Egg Shells] Unknown    Amoxicillin Rash    Cefzil [Cefprozil] Rash       Physical Exam:   Patient Vitals for the past 24 hrs:   BP Temp Temp src Pulse Resp   12/06/21 1815 135/81 98.2  F (36.8  C) Oral 75 18       Gen: resting comfortably, in NAD  CV: Well-perfused  Pulm: Breathing comfortably on room air  Abd: soft, gravid, non-tender, non-distended    Pelvic Exam:  Vulva: No external lesions, normal hair distribution  Vagina: Moist, pink, moderate white discharge, well rugated, no lesions  Cervix: smooth, pink, no visible lesions, cervix is visually closed, cerclage in place with no apparent tension, anterior stitch visible, no bleeding. Negative pooling.  SVE: Closed, no tension on cerclage      FHT: Baseline 125, moderate variability, + accels, No decels  [place]: <1 ctx q10 min    Labs:  Results for orders placed or performed during the hospital encounter of 12/06/21   Rupture of Fetal Membranes by ROM Plus     Status: Normal   Result Value Ref Range    Rupture of Fetal Membranes by ROM Plus Negative Negative, Invalid, Suggest Repeat    Narrative    It is recommended that the tests to  detect rupture of the amniotic membranes should not be used without other clinical assessments to make clinical patient management decision.       Assessment/Plan:   Yue Teran is a 31 year old female  at 35w4d by US with Olivas cerclage in place here for evaluation of possible  labor. As patient has closed cervix with no tension on cerclage and ROM plus negative, low concern for  labor at this time.    #Rule out  labor  #R/o PPROM  -SVE: closed, no tension on cerclage palpated  -ROM plus negative, membranes intact  -Contractions: <1 on toco    #Anxiety/Contractions  -Vistaril offered to help with anxiety with contractions; patient has some at home that she may try to help with sleep    - Dispo: to home with close follow-up and planned cerclage removal in the OR on    - Discussed labor warning signs and indication to return to care.    Staffed with Dr. Loera.    Megha Mason, MS3    I was present with the medical student who participated in the service and in the documentation of this note.  I have verified the history and personally performed the physical exam and medical decision making, and have verified the content of the note, which accurately reflect my assessment of the patient and the plan of care.    Esther Rico MD  OB/GYN, PGY-2  2021, 8:34 PM     I examined Yue Teran on 2021 with Dr. Rcio and agree with the presentation, exam and plan of care documented in this note with edits by me.   Guadalupe Loera MD MPH

## 2021-12-07 NOTE — DISCHARGE INSTRUCTIONS
Discharge Instruction for Undelivered Patients      You were seen for: Labor Assessment and Membrane Assessment  We Consulted: Dr Rico and Evaristo  You had (Test or Medicine):Fetal monitoring, Rupture of Membrane test, speculum exam and cervical exam     Diet:   Drink 8 to 12 glasses of liquids (milk, juice, water) every day.  You may eat meals and snacks.     Activity:  Rest the pelvic area. No sex. Do not stimulate breasts or nipples.  Count fetal kicks everyday (see handout)  Call your doctor or nurse midwife if your baby is moving less than usual.     Call your provider if you notice:  Swelling in your face or increased swelling in your hands or legs.  Headaches that are not relieved by Tylenol (acetaminophen).  Changes in your vision (blurring: seeing spots or stars.)  Nausea (sick to your stomach) and vomiting (throwing up).   Weight gain of 5 pounds or more per week.  Heartburn that doesn't go away.  Signs of bladder infection: pain when you urinate (use the toilet), need to go more often and more urgently.  The bag of benjamin (rupture of membranes) breaks, or you notice leaking in your underwear.  Bright red blood in your underwear.  Abdominal (lower belly) or stomach pain.  For first baby: Contractions (tightening) less than 5 minutes apart for one hour or more.  Increase or change in vaginal discharge (note the color and amount)      Follow-up:  As scheduled in the clinic

## 2021-12-07 NOTE — H&P (VIEW-ONLY)
Obstetrics Triage Note    Yue Teran  : 1990  MRN: [id number]    CC: Contractions, leakage of fluid    HPI:  Yue Teran is a 31 year old  female at 35w4d by US who presents for evaluation of possible pre-term labor. She has had a Olivas cerclage in place since 20w4d. Patient states that she has had low level vaginal fluid leakage for an extended time during her pregnancy, but that for the last couple of days it has seemed to significantly increase. Yesterday, she had two instances of fluid leakage following urination that she estimates to be about 0.5 cup in volume. Possible that it was urine, though patient had already urinated and the leakage felt different than urination to her. Since these 2 episodes, patient has had fairly continuous but low-volume leakage requiring constant use of pad. Patient additionally believes that she lost her mucous plug yesterday afternoon and that her water broke. Patient reports contractions in increasing frequency since Friday. These have been between 40-90 seconds and they are present both with rest and activity. Denies vaginal bleeding. Reports + fetal movement.Patient having headaches that have been worse this past week, possibly 2/2 to dehydration. No changes in vision, RUQ pain or worsening edema. She denies fevers/chills, nausea/vomiting, shortness or breath, chest pain, dysuria or other systemic symptoms. Patient does not report any other complications with her pregnancy other than those listed above.     Pregnancy is complicated by:  - olivas cerclage in place () - plans removal  in OR  - desires permanent sterilization  - anxiety/depression - zoloft    OB History    Para Term  AB Living   1 0 0 0 0 0   SAB IAB Ectopic Multiple Live Births   0 0 0 0 0      # Outcome Date GA Lbr Andres/2nd Weight Sex Delivery Anes PTL Lv   1 Current                ROS:  Negative except as mentioned in HPI.    PMH:  Past Medical History:   Diagnosis  Date    Anxiety     Depressive disorder        PSHx:  Past Surgical History:   Procedure Laterality Date    APPENDECTOMY  2011    CERCLAGE CERVICAL N/A 8/23/2021    Procedure: CERCLAGE, CERVIX, VAGINAL APPROACH;  Surgeon: Miladys Contreras MD;  [facility]: [facility]    CERCLAGE CERVICAL  08/23/2021    TONSILLECTOMY  2009    TONSILLECTOMY      WISDOM TOOTH EXTRACTION  2008       Medications:  Medications Prior to Admission   Medication Sig Dispense Refill Last Dose    docusate sodium (COLACE) 100 MG capsule Take 100 mg by mouth daily   12/5/2021 at 2300    Prenatal Vit-Fe Fumarate-FA (PRENATAL MULTIVITAMIN W/IRON) 27-0.8 MG tablet Take 1 tablet by mouth daily   12/5/2021 at 2300    sertraline (ZOLOFT) 50 MG tablet Take 1 tablet (50 mg) by mouth daily 90 tablet 3 12/5/2021 at 2300       Allergies:  Allergies   Allergen Reactions    Egg [Egg Shells] Unknown    Amoxicillin Rash    Cefzil [Cefprozil] Rash       Physical Exam:   Patient Vitals for the past 24 hrs:   BP Temp Temp src Pulse Resp   12/06/21 1815 135/81 98.2  F (36.8  C) Oral 75 18       Gen: resting comfortably, in NAD  CV: Well-perfused  Pulm: Breathing comfortably on room air  Abd: soft, gravid, non-tender, non-distended    Pelvic Exam:  Vulva: No external lesions, normal hair distribution  Vagina: Moist, pink, moderate white discharge, well rugated, no lesions  Cervix: smooth, pink, no visible lesions, cervix is visually closed, cerclage in place with no apparent tension, anterior stitch visible, no bleeding. Negative pooling.  SVE: Closed, no tension on cerclage      FHT: Baseline 125, moderate variability, + accels, No decels  [place]: <1 ctx q10 min    Labs:  Results for orders placed or performed during the hospital encounter of 12/06/21   Rupture of Fetal Membranes by ROM Plus     Status: Normal   Result Value Ref Range    Rupture of Fetal Membranes by ROM Plus Negative Negative, Invalid, Suggest Repeat    Narrative    It is recommended that the tests to  detect rupture of the amniotic membranes should not be used without other clinical assessments to make clinical patient management decision.       Assessment/Plan:   Yue Teran is a 31 year old female  at 35w4d by US with Olivas cerclage in place here for evaluation of possible  labor. As patient has closed cervix with no tension on cerclage and ROM plus negative, low concern for  labor at this time.    #Rule out  labor  #R/o PPROM  -SVE: closed, no tension on cerclage palpated  -ROM plus negative, membranes intact  -Contractions: <1 on toco    #Anxiety/Contractions  -Vistaril offered to help with anxiety with contractions; patient has some at home that she may try to help with sleep    - Dispo: to home with close follow-up and planned cerclage removal in the OR on    - Discussed labor warning signs and indication to return to care.    Staffed with Dr. Loera.    Megha Mason, MS3    I was present with the medical student who participated in the service and in the documentation of this note.  I have verified the history and personally performed the physical exam and medical decision making, and have verified the content of the note, which accurately reflect my assessment of the patient and the plan of care.    Esther Rico MD  OB/GYN, PGY-2  2021, 8:34 PM     I examined Yue Teran on 2021 with Dr. Rico and agree with the presentation, exam and plan of care documented in this note with edits by me.   Guadalupe Loera MD MPH

## 2021-12-07 NOTE — PROVIDER NOTIFICATION
12/06/21 1955   Provider Notification   Provider Name/Title Dr Loera/Trisha   Method of Notification At Bedside   Request Evaluate in Person   Notification Reason SVE   No sign of ROM or cervical dilation with speculum exam and SVE. Cerclage in place. Plan is to await ROM+ result and for pt to hydrate to help with ctx discomfort. Pt prefers to orally hydrate instead of IV hydration.

## 2021-12-07 NOTE — PROVIDER NOTIFICATION
21 1847   Provider Notification   Provider Name/Title Dr. Rico   Method of Notification Electronic Page   Request Evaluate in Person   Notification Reason Patient Arrived   FYI pt arrival. Pt feeling LOF since  and having irregular painful contractions.  35.4. Cerclage in place

## 2021-12-08 ENCOUNTER — ANESTHESIA EVENT (OUTPATIENT)
Dept: OBGYN | Facility: CLINIC | Age: 31
End: 2021-12-08
Payer: COMMERCIAL

## 2021-12-08 NOTE — ANESTHESIA PREPROCEDURE EVALUATION
Anesthesia Pre-Procedure Evaluation    Patient: Yue Teran   MRN: [id number] : 1990        Preoperative Diagnosis: High-risk pregnancy in second trimester [O09.92]    Procedure : Procedure(s):  REMOVAL, CERCLAGE SUTURE          Past Medical History:   Diagnosis Date     Anxiety      Depressive disorder       Past Surgical History:   Procedure Laterality Date     APPENDECTOMY  2011     CERCLAGE CERVICAL N/A 2021    Procedure: CERCLAGE, CERVIX, VAGINAL APPROACH;  Surgeon: Miladys Contreras MD;  [facility]     CERCLAGE CERVICAL  2021     TONSILLECTOMY  2009     TONSILLECTOMY       WISDOM TOOTH EXTRACTION  2008      Allergies   Allergen Reactions     Egg [Egg Shells] Unknown     Amoxicillin Rash     Cefzil [Cefprozil] Rash      Social History     Tobacco Use     Smoking status: Never Smoker     Smokeless tobacco: Never Used   Substance Use Topics     Alcohol use: Never      Wt Readings from Last 1 Encounters:   21 101.2 kg (223 lb)        Anesthesia Evaluation   Pt has had prior anesthetic. Type: Regional.    No history of anesthetic complications       ROS/MED HX  ENT/Pulmonary:  - neg pulmonary ROS     Neurologic:     (+) peripheral neuropathy,  : L sciatica to knee.   Cardiovascular:  - neg cardiovascular ROS     METS/Exercise Tolerance: >4 METS    Hematologic:  - neg hematologic  ROS     Musculoskeletal:  - neg musculoskeletal ROS     GI/Hepatic:  - neg GI/hepatic ROS     Renal/Genitourinary:  - neg Renal ROS     Endo:  - neg endo ROS     Psychiatric/Substance Use:     (+) psychiatric history anxiety and depression     Infectious Disease:  - neg infectious disease ROS     Malignancy:  - neg malignancy ROS     Other:            Physical Exam    Airway  airway exam normal      Mallampati: III   TM distance: > 3 FB   Neck ROM: full   Mouth opening: > 3 cm    Respiratory Devices and Support         Dental  no notable dental history         Cardiovascular   cardiovascular exam normal           Pulmonary   pulmonary exam normal                OUTSIDE LABS:  CBC:   Lab Results   Component Value Date    WBC 10.6 2021    WBC 9.4 10/07/2021    HGB 11.9 2021    HGB 11.7 10/07/2021    HCT 35.0 2021    HCT 35.2 10/07/2021     2021     10/07/2021     BMP:   Lab Results   Component Value Date     2021    POTASSIUM 3.4 2021    CHLORIDE 106 2021    CO2 22 2021    BUN 11 2021    CR 0.60 2021    GLC 94 2021     COAGS: No results found for: PTT, INR, FIBR  POC:   Lab Results   Component Value Date    HCGS Negative 2019     HEPATIC:   Lab Results   Component Value Date    ALBUMIN 2.4 (L) 2021    PROTTOTAL 6.0 (L) 2021    ALT 26 2021    AST 28 2021    ALKPHOS 166 (H) 2021    BILITOTAL 0.2 2021     OTHER:   Lab Results   Component Value Date    CAROLINE 9.3 2021    TSH 1.80 2019       Anesthesia Plan    ASA Status:  2   NPO Status:  ELEVATED Aspiration Risk/Unknown    Anesthesia Type: Spinal.              Consents    Anesthesia Plan(s) and associated risks, benefits, and realistic alternatives discussed. Questions answered and patient/representative(s) expressed understanding.    - Discussed: Risks, Benefits and Alternatives for BOTH SEDATION and the PROCEDURE were discussed     - Discussed with:          Postoperative Care    Pain management: Oral pain medications, IV analgesics, Multi-modal analgesia.   PONV prophylaxis: Ondansetron (or other 5HT-3)     Comments:    Other Comments: 31 year old  with Olivas cerclage, presenting today for removal.    - chloroprocaine 2.5 ml (75mg)            Johnnie Cruz MD

## 2021-12-09 ENCOUNTER — ANESTHESIA (OUTPATIENT)
Dept: OBGYN | Facility: CLINIC | Age: 31
End: 2021-12-09
Payer: COMMERCIAL

## 2021-12-09 ENCOUNTER — HOSPITAL ENCOUNTER (OUTPATIENT)
Facility: CLINIC | Age: 31
Discharge: HOME | End: 2021-12-09
Attending: OBSTETRICS & GYNECOLOGY | Admitting: OBSTETRICS & GYNECOLOGY
Payer: COMMERCIAL

## 2021-12-09 ENCOUNTER — HOSPITAL ENCOUNTER (OUTPATIENT)
Facility: CLINIC | Age: 31
End: 2021-12-09
Admitting: OBSTETRICS & GYNECOLOGY
Payer: COMMERCIAL

## 2021-12-09 VITALS
RESPIRATION RATE: 12 BRPM | TEMPERATURE: 98 F | SYSTOLIC BLOOD PRESSURE: 142 MMHG | HEART RATE: 88 BPM | DIASTOLIC BLOOD PRESSURE: 97 MMHG | OXYGEN SATURATION: 100 %

## 2021-12-09 DIAGNOSIS — O09.92 HIGH-RISK PREGNANCY IN SECOND TRIMESTER: ICD-10-CM

## 2021-12-09 LAB
ANION GAP SERPL CALCULATED.3IONS-SCNC: 5 MMOL/L (ref 3–14)
BUN SERPL-MCNC: 11 MG/DL (ref 7–30)
CALCIUM SERPL-MCNC: 8.6 MG/DL (ref 8.5–10.1)
CHLORIDE BLD-SCNC: 109 MMOL/L (ref 94–109)
CO2 SERPL-SCNC: 25 MMOL/L (ref 20–32)
CREAT SERPL-MCNC: 0.7 MG/DL (ref 0.52–1.04)
GFR SERPL CREATININE-BSD FRML MDRD: >90 ML/MIN/1.73M2
GLUCOSE BLD-MCNC: 76 MG/DL (ref 70–99)
MAGNESIUM SERPL-MCNC: 1.5 MG/DL (ref 1.6–2.3)
PHOSPHATE SERPL-MCNC: 3.9 MG/DL (ref 2.5–4.5)
POTASSIUM BLD-SCNC: 3.8 MMOL/L (ref 3.4–5.3)
SARS-COV-2 RNA RESP QL NAA+PROBE: NEGATIVE
SODIUM SERPL-SCNC: 139 MMOL/L (ref 133–144)

## 2021-12-09 PROCEDURE — 250N000009 HC RX 250

## 2021-12-09 PROCEDURE — 250N000013 HC RX MED GY IP 250 OP 250 PS 637: Performed by: STUDENT IN AN ORGANIZED HEALTH CARE EDUCATION/TRAINING PROGRAM

## 2021-12-09 PROCEDURE — 250N000011 HC RX IP 250 OP 636

## 2021-12-09 PROCEDURE — 258N000003 HC RX IP 258 OP 636

## 2021-12-09 PROCEDURE — 84100 ASSAY OF PHOSPHORUS: CPT

## 2021-12-09 PROCEDURE — 93005 ELECTROCARDIOGRAM TRACING: CPT

## 2021-12-09 PROCEDURE — 80048 BASIC METABOLIC PNL TOTAL CA: CPT

## 2021-12-09 PROCEDURE — 710N000010 HC RECOVERY PHASE 1, LEVEL 2, PER MIN: Performed by: OBSTETRICS & GYNECOLOGY

## 2021-12-09 PROCEDURE — 87653 STREP B DNA AMP PROBE: CPT | Performed by: STUDENT IN AN ORGANIZED HEALTH CARE EDUCATION/TRAINING PROGRAM

## 2021-12-09 PROCEDURE — 272N000001 HC OR GENERAL SUPPLY STERILE: Performed by: OBSTETRICS & GYNECOLOGY

## 2021-12-09 PROCEDURE — 36415 COLL VENOUS BLD VENIPUNCTURE: CPT

## 2021-12-09 PROCEDURE — 999N000141 HC STATISTIC PRE-PROCEDURE NURSING ASSESSMENT: Performed by: OBSTETRICS & GYNECOLOGY

## 2021-12-09 PROCEDURE — 360N000074 HC SURGERY LEVEL 1, PER MIN: Performed by: OBSTETRICS & GYNECOLOGY

## 2021-12-09 PROCEDURE — 59871 REMOVE CERCLAGE SUTURE: CPT | Mod: GC | Performed by: OBSTETRICS & GYNECOLOGY

## 2021-12-09 PROCEDURE — 87635 SARS-COV-2 COVID-19 AMP PRB: CPT | Performed by: OBSTETRICS & GYNECOLOGY

## 2021-12-09 PROCEDURE — 370N000017 HC ANESTHESIA TECHNICAL FEE, PER MIN: Performed by: OBSTETRICS & GYNECOLOGY

## 2021-12-09 PROCEDURE — 83735 ASSAY OF MAGNESIUM: CPT

## 2021-12-09 RX ORDER — NALOXONE HYDROCHLORIDE 0.4 MG/ML
0.2 INJECTION, SOLUTION INTRAMUSCULAR; INTRAVENOUS; SUBCUTANEOUS
Status: DISCONTINUED | OUTPATIENT
Start: 2021-12-09 | End: 2021-12-09 | Stop reason: HOSPADM

## 2021-12-09 RX ORDER — ONDANSETRON 2 MG/ML
4 INJECTION INTRAMUSCULAR; INTRAVENOUS EVERY 30 MIN PRN
Status: DISCONTINUED | OUTPATIENT
Start: 2021-12-09 | End: 2021-12-09 | Stop reason: HOSPADM

## 2021-12-09 RX ORDER — ONDANSETRON 4 MG/1
4 TABLET, ORALLY DISINTEGRATING ORAL EVERY 30 MIN PRN
Status: DISCONTINUED | OUTPATIENT
Start: 2021-12-09 | End: 2021-12-09 | Stop reason: HOSPADM

## 2021-12-09 RX ORDER — SODIUM CHLORIDE, SODIUM LACTATE, POTASSIUM CHLORIDE, CALCIUM CHLORIDE 600; 310; 30; 20 MG/100ML; MG/100ML; MG/100ML; MG/100ML
INJECTION, SOLUTION INTRAVENOUS CONTINUOUS
Status: DISCONTINUED | OUTPATIENT
Start: 2021-12-09 | End: 2021-12-09 | Stop reason: HOSPADM

## 2021-12-09 RX ORDER — NALOXONE HYDROCHLORIDE 0.4 MG/ML
0.4 INJECTION, SOLUTION INTRAMUSCULAR; INTRAVENOUS; SUBCUTANEOUS
Status: DISCONTINUED | OUTPATIENT
Start: 2021-12-09 | End: 2021-12-09 | Stop reason: HOSPADM

## 2021-12-09 RX ORDER — LIDOCAINE 40 MG/G
CREAM TOPICAL
Status: DISCONTINUED | OUTPATIENT
Start: 2021-12-09 | End: 2021-12-09 | Stop reason: HOSPADM

## 2021-12-09 RX ORDER — ACETAMINOPHEN 325 MG/1
975 TABLET ORAL ONCE
Status: COMPLETED | OUTPATIENT
Start: 2021-12-09 | End: 2021-12-09

## 2021-12-09 RX ORDER — LABETALOL HYDROCHLORIDE 5 MG/ML
10 INJECTION, SOLUTION INTRAVENOUS
Status: DISCONTINUED | OUTPATIENT
Start: 2021-12-09 | End: 2021-12-09 | Stop reason: HOSPADM

## 2021-12-09 RX ORDER — CHLOROPROCAINE HYDROCHLORIDE 30 MG/ML
INJECTION, SOLUTION EPIDURAL; INFILTRATION; INTRACAUDAL; PERINEURAL PRN
Status: DISCONTINUED | OUTPATIENT
Start: 2021-12-09 | End: 2021-12-09

## 2021-12-09 RX ORDER — EPHEDRINE SULFATE 50 MG/ML
INJECTION, SOLUTION INTRAMUSCULAR; INTRAVENOUS; SUBCUTANEOUS PRN
Status: DISCONTINUED | OUTPATIENT
Start: 2021-12-09 | End: 2021-12-09

## 2021-12-09 RX ORDER — CHLOROPROCAINE HYDROCHLORIDE 20 MG/ML
INJECTION, SOLUTION EPIDURAL; INFILTRATION; INTRACAUDAL; PERINEURAL
Status: COMPLETED | OUTPATIENT
Start: 2021-12-09 | End: 2021-12-09

## 2021-12-09 RX ORDER — OXYCODONE HYDROCHLORIDE 5 MG/1
5 TABLET ORAL EVERY 4 HOURS PRN
Status: DISCONTINUED | OUTPATIENT
Start: 2021-12-09 | End: 2021-12-09 | Stop reason: HOSPADM

## 2021-12-09 RX ORDER — FENTANYL CITRATE-0.9 % NACL/PF 10 MCG/ML
PLASTIC BAG, INJECTION (ML) INTRAVENOUS CONTINUOUS PRN
Status: DISCONTINUED | OUTPATIENT
Start: 2021-12-09 | End: 2021-12-09

## 2021-12-09 RX ORDER — MEPERIDINE HYDROCHLORIDE 25 MG/ML
12.5 INJECTION INTRAMUSCULAR; INTRAVENOUS; SUBCUTANEOUS
Status: DISCONTINUED | OUTPATIENT
Start: 2021-12-09 | End: 2021-12-09 | Stop reason: HOSPADM

## 2021-12-09 RX ADMIN — Medication 50 MCG/MIN: at 14:20

## 2021-12-09 RX ADMIN — ONDANSETRON 4 MG: 2 INJECTION INTRAMUSCULAR; INTRAVENOUS at 14:52

## 2021-12-09 RX ADMIN — CHLOROPROCAINE HYDROCHLORIDE 2.5 ML: 30 INJECTION, SOLUTION EPIDURAL; INFILTRATION; INTRACAUDAL; PERINEURAL at 14:50

## 2021-12-09 RX ADMIN — ACETAMINOPHEN 975 MG: 325 TABLET, FILM COATED ORAL at 12:36

## 2021-12-09 RX ADMIN — SODIUM CHLORIDE, POTASSIUM CHLORIDE, SODIUM LACTATE AND CALCIUM CHLORIDE: 600; 310; 30; 20 INJECTION, SOLUTION INTRAVENOUS at 12:34

## 2021-12-09 RX ADMIN — CHLOROPROCAINE HYDROCHLORIDE 75 MG: 20 INJECTION, SOLUTION EPIDURAL; INFILTRATION; INTRACAUDAL; PERINEURAL at 14:50

## 2021-12-09 RX ADMIN — Medication 10 MG: at 14:54

## 2021-12-09 NOTE — DISCHARGE INSTRUCTIONS
Discharge Instruction for Undelivered Patients      You were seen for: cerclage removal  We Consulted: Dr Thao  You had (Test or Medicine): fetal monitoring, spinal anesthesia, cerclage removal, recovery     Diet:   Drink 8 to 12 glasses of liquids (milk, juice, water) every day.  You may eat meals and snacks.     Activity:  Call your doctor or nurse midwife if your baby is moving less than usual.     Call your provider if you notice:  Swelling in your face or increased swelling in your hands or legs.  Headaches that are not relieved by Tylenol (acetaminophen).  Changes in your vision (blurring: seeing spots or stars.)  Nausea (sick to your stomach) and vomiting (throwing up).   Weight gain of 5 pounds or more per week.  Heartburn that doesn't go away.    Signs of bladder infection: pain when you urinate (use the toilet), need to go more often and more urgently.  The bag of benjamin (rupture of membranes) breaks, or you notice leaking in your underwear.  Bright red blood in your underwear.  Abdominal (lower belly) or stomach pain.  For first baby: Contractions (tightening) less than 5 minutes apart for one hour or more.  Increase or change in vaginal discharge (note the color and amount).     Follow-up:  As scheduled in the clinic

## 2021-12-09 NOTE — OR NURSING
Data: Pt to OB PACU at 1530 via cart. PIV infusing without complications, pt denies any pain, denies any nausea and vomiting.  Interventions: IV to pump, monitors and alarms on, SCD on.  Response: stable.  Plan: Patient instructed to notify RN for pain or nausea, routine post op cares.

## 2021-12-09 NOTE — ANESTHESIA CARE TRANSFER NOTE
Patient: Yue Teran    Procedure: Procedure(s):  REMOVAL, CERCLAGE SUTURE       Diagnosis: High-risk pregnancy in second trimester [O09.92]  Diagnosis Additional Information: No value filed.    Anesthesia Type:   Spinal     Note:    Oropharynx: oropharynx clear of all foreign objects and spontaneously breathing  Level of Consciousness: awake  Oxygen Supplementation: face mask  Level of Supplemental Oxygen (L/min / FiO2): 6  Independent Airway: airway patency satisfactory and stable  Dentition: dentition unchanged    Report to RN Given: handoff report given  Patient transferred to: PACU    Handoff Report: Identifed the Patient, Identified the Reponsible Provider, Reviewed the pertinent medical history, Discussed the surgical course, Reviewed Intra-OP anesthesia mangement and issues during anesthesia, Set expectations for post-procedure period and Allowed opportunity for questions and acknowledgement of understanding      Vitals:  Vitals Value Taken Time   BP     Temp     Pulse     Resp     SpO2         Electronically Signed By: Johnnie Cruz MD  December 9, 2021  3:33 PM

## 2021-12-09 NOTE — ANESTHESIA PROCEDURE NOTES
Intrathecal catheter Procedure Note    Pre-Procedure   Staff -        Performed By: with residents       Procedure performed by resident/fellow/CRNA in presence of a teaching physician.         Location: OB       Procedure Start/Stop Times: 12/9/2021 2:30 PM and 12/9/2021 2:51 PM       Pre-Anesthestic Checklist: patient identified, IV checked, risks and benefits discussed, informed consent, monitors and equipment checked, pre-op evaluation, at physician/surgeon's request and post-op pain management  Timeout:       Correct Patient: Yes        Correct Procedure: Yes        Correct Site: Yes        Correct Position: Yes   Procedure Documentation  Procedure: intrathecal catheter       Patient Position: sitting       Skin prep: Chloraprep       Insertion Site: L3-4. (midline approach).       Needle Gauge: 24.        Needle Length (Inches): 3.5        Spinal Needle Type: Pencan       Introducer used       Introducer: 20 G       # of attempts: 2 and  # of redirects:  3    Assessment/Narrative         Paresthesias: No.       Sensory Level: T6       CSF fluid: clear.    Medication(s) Administered   2% Chloroprocaine PF (Intrathecal), 75 mg  Medication Administration Time: 12/9/2021 2:50 PM

## 2021-12-09 NOTE — ANESTHESIA POSTPROCEDURE EVALUATION
Patient: Yue Teran    Procedure: Procedure(s):  REMOVAL, CERCLAGE SUTURE       Diagnosis:High-risk pregnancy in second trimester [O09.92]  Diagnosis Additional Information: No value filed.    Anesthesia Type:  Spinal    Note:  Disposition: Outpatient   Postop Pain Control: Uneventful            Sign Out: Well controlled pain   PONV: No   Neuro/Psych: Uneventful            Sign Out: Acceptable/Baseline neuro status   Airway/Respiratory: Uneventful            Sign Out: Acceptable/Baseline resp. status   CV/Hemodynamics: Uneventful            Sign Out: Acceptable CV status; No obvious hypovolemia; No obvious fluid overload   Other NRE: NONE   DID A NON-ROUTINE EVENT OCCUR? No           Last vitals:  Vitals Value Taken Time   /97 12/09/21 1635   Temp     Pulse 88 12/09/21 1637   Resp 12 12/09/21 1637   SpO2 89 % 12/09/21 1636   Vitals shown include unvalidated device data.    Electronically Signed By: Ariella Simms MD  December 9, 2021  4:45 PM

## 2021-12-09 NOTE — OP NOTE
OBGYN OPERATIVE NOTE    Date of Service: 2021  Patient Name: Yue Teran  MRN: [id number]      Pre-operative diagnosis:  1. Cervical insufficiency w/ rescue Olivas cerclage at 20w     Post-operative diagnosis:  1. same as above - s/p cerclage removal     Procedure:   Olivas cerclage removal      Surgeon: Lurdes Thao MD  Assistants:   - Kathi Suazo MD PGY3     Anesthesia: Spinal     EBL: 5 mL  Urine: Bladder not drained      Specimens:   1. GBS collection     Complications: None     Findings: normal appearing external female genitalia, on bimanual exam, cervix with 2 stitches, intact.  Cervix hemostatic at end of procedure.  Cervix 3/80/-2 after cerclage removed.     Indications: Yue Teran is a 31 year old  at 36w0d who was found to have cervical insufficiency and received rescue Olivas cerclage at 20 weeks presented today for cerclage removal. The patient's questions were answered, understanding confirmed, and the patient signed written informed consent.    Procedure details: Spinal anesthesia was administered. GBS swab collected. Patient was prepped and draped in the usual fashion.  Speculum placed, anterior of the cervix grasped with ring forceps.  Suture of the knot was grasped with a packing forceps and stitches were cut and removed easily and completely. All instruments and were accounted for x2. Dr. Thao was present for the entire procedure. The patient tolerated the procedure well and transferred to the PACU in stable condition.    Kathi Suazo MD MSc  OBGYN Resident, PGY3  2021, 3:36 PM    I was present and scrubbed throughout the procedure,  I agree with the note above  Lurdes Thao MD

## 2021-12-10 LAB
GP B STREP DNA SPEC QL NAA+PROBE: NEGATIVE
PATIENT PENICILLIN, AMOXICILLIN, CEPHALOSPORINS ALLERGY: YES

## 2021-12-15 ENCOUNTER — MYC MEDICAL ADVICE (OUTPATIENT)
Dept: OBGYN | Facility: CLINIC | Age: 31
End: 2021-12-15
Payer: COMMERCIAL

## 2021-12-15 NOTE — TELEPHONE ENCOUNTER
Sent pt message via My Chart with kick count info. Pt stated that baby still moving but not as much. This writer called the pt to discuss fetal movements. Pt was advised to sit and put feet up and try to feel baby movements.  Pt was told that any movement in 1 hour would count towards a movement.  There should be 10 movements within 1 hour.  If 10 movements were not felt then drink something sweet or cold and try for another hour for 10 movements.  Pt verbalized understanding and agreed to the plan.

## 2021-12-16 ENCOUNTER — HOSPITAL ENCOUNTER (INPATIENT)
Facility: CLINIC | Age: 31
LOS: 5 days | Discharge: HOME HEALTH | End: 2021-12-21
Attending: OBSTETRICS & GYNECOLOGY | Admitting: OBSTETRICS & GYNECOLOGY
Payer: COMMERCIAL

## 2021-12-16 ENCOUNTER — OFFICE VISIT (OUTPATIENT)
Dept: OBGYN | Facility: CLINIC | Age: 31
End: 2021-12-16
Attending: MIDWIFE
Payer: COMMERCIAL

## 2021-12-16 VITALS
HEART RATE: 98 BPM | DIASTOLIC BLOOD PRESSURE: 103 MMHG | HEIGHT: 67 IN | BODY MASS INDEX: 39.18 KG/M2 | WEIGHT: 249.6 LBS | SYSTOLIC BLOOD PRESSURE: 154 MMHG

## 2021-12-16 DIAGNOSIS — O09.93 HIGH-RISK PREGNANCY IN THIRD TRIMESTER: Primary | ICD-10-CM

## 2021-12-16 DIAGNOSIS — Z30.2 STERILIZATION: ICD-10-CM

## 2021-12-16 DIAGNOSIS — O13.3 GESTATIONAL HYPERTENSION, THIRD TRIMESTER: ICD-10-CM

## 2021-12-16 DIAGNOSIS — O34.30 CERVICAL INSUFFICIENCY DURING PREGNANCY, ANTEPARTUM: ICD-10-CM

## 2021-12-16 PROBLEM — O13.9 GESTATIONAL HYPERTENSION: Status: ACTIVE | Noted: 2021-12-16

## 2021-12-16 LAB
ABO/RH(D): NORMAL
ALT SERPL W P-5'-P-CCNC: 29 U/L (ref 0–50)
ANTIBODY SCREEN: NEGATIVE
AST SERPL W P-5'-P-CCNC: 29 U/L (ref 0–45)
CREAT SERPL-MCNC: 0.6 MG/DL (ref 0.52–1.04)
CREAT UR-MCNC: 142 MG/DL
ERYTHROCYTE [DISTWIDTH] IN BLOOD BY AUTOMATED COUNT: 13.4 % (ref 10–15)
GFR SERPL CREATININE-BSD FRML MDRD: >90 ML/MIN/1.73M2
HCT VFR BLD AUTO: 33.2 % (ref 35–47)
HGB BLD-MCNC: 11.6 G/DL (ref 11.7–15.7)
MCH RBC QN AUTO: 30.1 PG (ref 26.5–33)
MCHC RBC AUTO-ENTMCNC: 34.9 G/DL (ref 31.5–36.5)
MCV RBC AUTO: 86 FL (ref 78–100)
PLATELET # BLD AUTO: 165 10E3/UL (ref 150–450)
PROT UR-MCNC: 0.25 G/L
PROT/CREAT 24H UR: 0.18 G/G CR (ref 0–0.2)
RBC # BLD AUTO: 3.85 10E6/UL (ref 3.8–5.2)
SARS-COV-2 RNA RESP QL NAA+PROBE: NEGATIVE
SPECIMEN EXPIRATION DATE: NORMAL
WBC # BLD AUTO: 10.1 10E3/UL (ref 4–11)

## 2021-12-16 PROCEDURE — 86901 BLOOD TYPING SEROLOGIC RH(D): CPT | Performed by: STUDENT IN AN ORGANIZED HEALTH CARE EDUCATION/TRAINING PROGRAM

## 2021-12-16 PROCEDURE — U0005 INFEC AGEN DETEC AMPLI PROBE: HCPCS | Performed by: STUDENT IN AN ORGANIZED HEALTH CARE EDUCATION/TRAINING PROGRAM

## 2021-12-16 PROCEDURE — 86850 RBC ANTIBODY SCREEN: CPT | Performed by: STUDENT IN AN ORGANIZED HEALTH CARE EDUCATION/TRAINING PROGRAM

## 2021-12-16 PROCEDURE — 250N000009 HC RX 250: Performed by: STUDENT IN AN ORGANIZED HEALTH CARE EDUCATION/TRAINING PROGRAM

## 2021-12-16 PROCEDURE — 250N000011 HC RX IP 250 OP 636: Performed by: STUDENT IN AN ORGANIZED HEALTH CARE EDUCATION/TRAINING PROGRAM

## 2021-12-16 PROCEDURE — 86780 TREPONEMA PALLIDUM: CPT | Performed by: STUDENT IN AN ORGANIZED HEALTH CARE EDUCATION/TRAINING PROGRAM

## 2021-12-16 PROCEDURE — 250N000013 HC RX MED GY IP 250 OP 250 PS 637: Performed by: STUDENT IN AN ORGANIZED HEALTH CARE EDUCATION/TRAINING PROGRAM

## 2021-12-16 PROCEDURE — 84450 TRANSFERASE (AST) (SGOT): CPT | Performed by: STUDENT IN AN ORGANIZED HEALTH CARE EDUCATION/TRAINING PROGRAM

## 2021-12-16 PROCEDURE — 84460 ALANINE AMINO (ALT) (SGPT): CPT | Performed by: STUDENT IN AN ORGANIZED HEALTH CARE EDUCATION/TRAINING PROGRAM

## 2021-12-16 PROCEDURE — 85014 HEMATOCRIT: CPT | Performed by: STUDENT IN AN ORGANIZED HEALTH CARE EDUCATION/TRAINING PROGRAM

## 2021-12-16 PROCEDURE — G0463 HOSPITAL OUTPT CLINIC VISIT: HCPCS

## 2021-12-16 PROCEDURE — 258N000003 HC RX IP 258 OP 636: Performed by: STUDENT IN AN ORGANIZED HEALTH CARE EDUCATION/TRAINING PROGRAM

## 2021-12-16 PROCEDURE — 120N000002 HC R&B MED SURG/OB UMMC

## 2021-12-16 PROCEDURE — 84156 ASSAY OF PROTEIN URINE: CPT | Performed by: STUDENT IN AN ORGANIZED HEALTH CARE EDUCATION/TRAINING PROGRAM

## 2021-12-16 PROCEDURE — 250N000013 HC RX MED GY IP 250 OP 250 PS 637

## 2021-12-16 PROCEDURE — 99212 OFFICE O/P EST SF 10 MIN: CPT | Performed by: MIDWIFE

## 2021-12-16 PROCEDURE — 82565 ASSAY OF CREATININE: CPT | Performed by: STUDENT IN AN ORGANIZED HEALTH CARE EDUCATION/TRAINING PROGRAM

## 2021-12-16 RX ORDER — LORAZEPAM 2 MG/ML
2 INJECTION INTRAMUSCULAR
Status: DISCONTINUED | OUTPATIENT
Start: 2021-12-16 | End: 2021-12-21 | Stop reason: HOSPADM

## 2021-12-16 RX ORDER — METOCLOPRAMIDE 10 MG/1
10 TABLET ORAL EVERY 6 HOURS PRN
Status: DISCONTINUED | OUTPATIENT
Start: 2021-12-16 | End: 2021-12-17

## 2021-12-16 RX ORDER — ONDANSETRON 4 MG/1
4 TABLET, ORALLY DISINTEGRATING ORAL EVERY 6 HOURS PRN
Status: DISCONTINUED | OUTPATIENT
Start: 2021-12-16 | End: 2021-12-17

## 2021-12-16 RX ORDER — MISOPROSTOL 200 UG/1
400 TABLET ORAL
Status: DISCONTINUED | OUTPATIENT
Start: 2021-12-16 | End: 2021-12-17

## 2021-12-16 RX ORDER — CARBOPROST TROMETHAMINE 250 UG/ML
250 INJECTION, SOLUTION INTRAMUSCULAR
Status: DISCONTINUED | OUTPATIENT
Start: 2021-12-16 | End: 2021-12-17

## 2021-12-16 RX ORDER — IBUPROFEN 600 MG/1
600 TABLET, FILM COATED ORAL
Status: DISCONTINUED | OUTPATIENT
Start: 2021-12-16 | End: 2021-12-17

## 2021-12-16 RX ORDER — LIDOCAINE 40 MG/G
CREAM TOPICAL
Status: DISCONTINUED | OUTPATIENT
Start: 2021-12-16 | End: 2021-12-17

## 2021-12-16 RX ORDER — TERBUTALINE SULFATE 1 MG/ML
0.25 INJECTION, SOLUTION SUBCUTANEOUS
Status: DISCONTINUED | OUTPATIENT
Start: 2021-12-16 | End: 2021-12-17

## 2021-12-16 RX ORDER — METOCLOPRAMIDE HYDROCHLORIDE 5 MG/ML
10 INJECTION INTRAMUSCULAR; INTRAVENOUS EVERY 6 HOURS PRN
Status: DISCONTINUED | OUTPATIENT
Start: 2021-12-16 | End: 2021-12-16

## 2021-12-16 RX ORDER — NALOXONE HYDROCHLORIDE 0.4 MG/ML
0.4 INJECTION, SOLUTION INTRAMUSCULAR; INTRAVENOUS; SUBCUTANEOUS
Status: DISCONTINUED | OUTPATIENT
Start: 2021-12-16 | End: 2021-12-17

## 2021-12-16 RX ORDER — CALCIUM GLUCONATE 94 MG/ML
1 INJECTION, SOLUTION INTRAVENOUS
Status: DISCONTINUED | OUTPATIENT
Start: 2021-12-16 | End: 2021-12-21 | Stop reason: HOSPADM

## 2021-12-16 RX ORDER — PROCHLORPERAZINE 25 MG
25 SUPPOSITORY, RECTAL RECTAL EVERY 12 HOURS PRN
Status: DISCONTINUED | OUTPATIENT
Start: 2021-12-16 | End: 2021-12-17

## 2021-12-16 RX ORDER — OXYTOCIN 10 [USP'U]/ML
10 INJECTION, SOLUTION INTRAMUSCULAR; INTRAVENOUS
Status: DISCONTINUED | OUTPATIENT
Start: 2021-12-16 | End: 2021-12-17

## 2021-12-16 RX ORDER — KETOROLAC TROMETHAMINE 30 MG/ML
30 INJECTION, SOLUTION INTRAMUSCULAR; INTRAVENOUS
Status: DISCONTINUED | OUTPATIENT
Start: 2021-12-16 | End: 2021-12-17

## 2021-12-16 RX ORDER — OXYTOCIN/0.9 % SODIUM CHLORIDE 30/500 ML
340 PLASTIC BAG, INJECTION (ML) INTRAVENOUS CONTINUOUS PRN
Status: COMPLETED | OUTPATIENT
Start: 2021-12-16 | End: 2021-12-17

## 2021-12-16 RX ORDER — DIPHENHYDRAMINE HCL 25 MG
25 CAPSULE ORAL EVERY 6 HOURS PRN
Status: DISCONTINUED | OUTPATIENT
Start: 2021-12-16 | End: 2021-12-17

## 2021-12-16 RX ORDER — ACETAMINOPHEN 325 MG/1
650 TABLET ORAL EVERY 4 HOURS PRN
Status: DISCONTINUED | OUTPATIENT
Start: 2021-12-16 | End: 2021-12-17

## 2021-12-16 RX ORDER — MAGNESIUM SULFATE IN WATER 40 MG/ML
2 INJECTION, SOLUTION INTRAVENOUS CONTINUOUS
Status: DISPENSED | OUTPATIENT
Start: 2021-12-16 | End: 2021-12-18

## 2021-12-16 RX ORDER — MISOPROSTOL 200 UG/1
800 TABLET ORAL
Status: DISCONTINUED | OUTPATIENT
Start: 2021-12-16 | End: 2021-12-17

## 2021-12-16 RX ORDER — FENTANYL CITRATE 50 UG/ML
50-100 INJECTION, SOLUTION INTRAMUSCULAR; INTRAVENOUS
Status: DISCONTINUED | OUTPATIENT
Start: 2021-12-16 | End: 2021-12-17

## 2021-12-16 RX ORDER — SODIUM CHLORIDE, SODIUM LACTATE, POTASSIUM CHLORIDE, CALCIUM CHLORIDE 600; 310; 30; 20 MG/100ML; MG/100ML; MG/100ML; MG/100ML
INJECTION, SOLUTION INTRAVENOUS CONTINUOUS
Status: DISCONTINUED | OUTPATIENT
Start: 2021-12-16 | End: 2021-12-16

## 2021-12-16 RX ORDER — DIPHENHYDRAMINE HYDROCHLORIDE 50 MG/ML
25 INJECTION INTRAMUSCULAR; INTRAVENOUS EVERY 6 HOURS PRN
Status: DISCONTINUED | OUTPATIENT
Start: 2021-12-16 | End: 2021-12-17

## 2021-12-16 RX ORDER — METOCLOPRAMIDE HYDROCHLORIDE 5 MG/ML
10 INJECTION INTRAMUSCULAR; INTRAVENOUS EVERY 6 HOURS PRN
Status: DISCONTINUED | OUTPATIENT
Start: 2021-12-16 | End: 2021-12-17

## 2021-12-16 RX ORDER — SODIUM CHLORIDE, SODIUM LACTATE, POTASSIUM CHLORIDE, CALCIUM CHLORIDE 600; 310; 30; 20 MG/100ML; MG/100ML; MG/100ML; MG/100ML
10-125 INJECTION, SOLUTION INTRAVENOUS CONTINUOUS
Status: DISCONTINUED | OUTPATIENT
Start: 2021-12-16 | End: 2021-12-21 | Stop reason: HOSPADM

## 2021-12-16 RX ORDER — LABETALOL HYDROCHLORIDE 5 MG/ML
20-80 INJECTION, SOLUTION INTRAVENOUS EVERY 10 MIN PRN
Status: DISCONTINUED | OUTPATIENT
Start: 2021-12-16 | End: 2021-12-21 | Stop reason: HOSPADM

## 2021-12-16 RX ORDER — NIFEDIPINE 10 MG/1
CAPSULE ORAL
Status: COMPLETED
Start: 2021-12-16 | End: 2021-12-16

## 2021-12-16 RX ORDER — OXYTOCIN/0.9 % SODIUM CHLORIDE 30/500 ML
100-340 PLASTIC BAG, INJECTION (ML) INTRAVENOUS CONTINUOUS PRN
Status: DISCONTINUED | OUTPATIENT
Start: 2021-12-16 | End: 2021-12-17

## 2021-12-16 RX ORDER — METOCLOPRAMIDE 10 MG/1
10 TABLET ORAL EVERY 6 HOURS PRN
Status: DISCONTINUED | OUTPATIENT
Start: 2021-12-16 | End: 2021-12-16

## 2021-12-16 RX ORDER — TRANEXAMIC ACID 10 MG/ML
1 INJECTION, SOLUTION INTRAVENOUS EVERY 30 MIN PRN
Status: DISCONTINUED | OUTPATIENT
Start: 2021-12-16 | End: 2021-12-17

## 2021-12-16 RX ORDER — METHYLERGONOVINE MALEATE 0.2 MG/ML
200 INJECTION INTRAVENOUS
Status: DISCONTINUED | OUTPATIENT
Start: 2021-12-16 | End: 2021-12-17

## 2021-12-16 RX ORDER — HYDRALAZINE HYDROCHLORIDE 20 MG/ML
10 INJECTION INTRAMUSCULAR; INTRAVENOUS
Status: DISCONTINUED | OUTPATIENT
Start: 2021-12-16 | End: 2021-12-21 | Stop reason: HOSPADM

## 2021-12-16 RX ORDER — NALOXONE HYDROCHLORIDE 0.4 MG/ML
0.2 INJECTION, SOLUTION INTRAMUSCULAR; INTRAVENOUS; SUBCUTANEOUS
Status: DISCONTINUED | OUTPATIENT
Start: 2021-12-16 | End: 2021-12-17

## 2021-12-16 RX ORDER — LIDOCAINE 40 MG/G
CREAM TOPICAL
Status: DISCONTINUED | OUTPATIENT
Start: 2021-12-16 | End: 2021-12-21 | Stop reason: HOSPADM

## 2021-12-16 RX ORDER — MAGNESIUM SULFATE HEPTAHYDRATE 40 MG/ML
2 INJECTION, SOLUTION INTRAVENOUS
Status: DISCONTINUED | OUTPATIENT
Start: 2021-12-16 | End: 2021-12-21 | Stop reason: HOSPADM

## 2021-12-16 RX ORDER — PROCHLORPERAZINE MALEATE 10 MG
10 TABLET ORAL EVERY 6 HOURS PRN
Status: DISCONTINUED | OUTPATIENT
Start: 2021-12-16 | End: 2021-12-17

## 2021-12-16 RX ORDER — CITRIC ACID/SODIUM CITRATE 334-500MG
30 SOLUTION, ORAL ORAL ONCE
Status: DISCONTINUED | OUTPATIENT
Start: 2021-12-16 | End: 2021-12-17

## 2021-12-16 RX ORDER — SODIUM CHLORIDE, SODIUM LACTATE, POTASSIUM CHLORIDE, CALCIUM CHLORIDE 600; 310; 30; 20 MG/100ML; MG/100ML; MG/100ML; MG/100ML
INJECTION, SOLUTION INTRAVENOUS CONTINUOUS PRN
Status: DISCONTINUED | OUTPATIENT
Start: 2021-12-16 | End: 2021-12-17

## 2021-12-16 RX ORDER — OXYTOCIN/0.9 % SODIUM CHLORIDE 30/500 ML
1-24 PLASTIC BAG, INJECTION (ML) INTRAVENOUS CONTINUOUS
Status: DISCONTINUED | OUTPATIENT
Start: 2021-12-16 | End: 2021-12-17

## 2021-12-16 RX ORDER — MAGNESIUM SULFATE HEPTAHYDRATE 40 MG/ML
4 INJECTION, SOLUTION INTRAVENOUS
Status: DISCONTINUED | OUTPATIENT
Start: 2021-12-16 | End: 2021-12-21 | Stop reason: HOSPADM

## 2021-12-16 RX ORDER — MAGNESIUM SULFATE HEPTAHYDRATE 500 MG/ML
10 INJECTION, SOLUTION INTRAMUSCULAR; INTRAVENOUS
Status: DISCONTINUED | OUTPATIENT
Start: 2021-12-16 | End: 2021-12-21 | Stop reason: HOSPADM

## 2021-12-16 RX ORDER — NIFEDIPINE 10 MG/1
10-20 CAPSULE ORAL
Status: DISCONTINUED | OUTPATIENT
Start: 2021-12-16 | End: 2021-12-21 | Stop reason: HOSPADM

## 2021-12-16 RX ORDER — ONDANSETRON 2 MG/ML
4 INJECTION INTRAMUSCULAR; INTRAVENOUS EVERY 6 HOURS PRN
Status: DISCONTINUED | OUTPATIENT
Start: 2021-12-16 | End: 2021-12-17

## 2021-12-16 RX ORDER — MAGNESIUM SULFATE HEPTAHYDRATE 40 MG/ML
4 INJECTION, SOLUTION INTRAVENOUS ONCE
Status: COMPLETED | OUTPATIENT
Start: 2021-12-16 | End: 2021-12-16

## 2021-12-16 RX ADMIN — DIPHENHYDRAMINE HYDROCHLORIDE 25 MG: 25 CAPSULE ORAL at 23:07

## 2021-12-16 RX ADMIN — NIFEDIPINE 10 MG: 10 CAPSULE ORAL at 16:58

## 2021-12-16 RX ADMIN — Medication 2 MILLI-UNITS/MIN: at 17:52

## 2021-12-16 RX ADMIN — MAGNESIUM SULFATE HEPTAHYDRATE 4 G: 40 INJECTION, SOLUTION INTRAVENOUS at 17:52

## 2021-12-16 RX ADMIN — LABETALOL HYDROCHLORIDE 20 MG: 5 INJECTION, SOLUTION INTRAVENOUS at 20:31

## 2021-12-16 RX ADMIN — SERTRALINE HYDROCHLORIDE 50 MG: 50 TABLET ORAL at 22:19

## 2021-12-16 RX ADMIN — ACETAMINOPHEN 650 MG: 325 TABLET, FILM COATED ORAL at 20:19

## 2021-12-16 RX ADMIN — METOCLOPRAMIDE 10 MG: 10 TABLET ORAL at 23:07

## 2021-12-16 RX ADMIN — SODIUM CHLORIDE, POTASSIUM CHLORIDE, SODIUM LACTATE AND CALCIUM CHLORIDE: 600; 310; 30; 20 INJECTION, SOLUTION INTRAVENOUS at 17:49

## 2021-12-16 RX ADMIN — MAGNESIUM SULFATE HEPTAHYDRATE 2 G/HR: 40 INJECTION, SOLUTION INTRAVENOUS at 18:23

## 2021-12-16 ASSESSMENT — MIFFLIN-ST. JEOR
SCORE: 1889.93
SCORE: 1887.09
SCORE: 1794.67

## 2021-12-16 ASSESSMENT — PAIN SCALES - GENERAL: PAINLEVEL: NO PAIN (0)

## 2021-12-16 NOTE — H&P
Ob/Gyn History and Physical   2021  Yue Teran  [id number]      HPI: Yue Teran is a 31 year old  at 37w0d by 8w1d US who presents from clinic for elevated BP.     She had a clinic appt today, BP was severe range so she was sent over for evaluation. She is feeling well. She has had an intermittent headache over the past few weeks that have improved with caffeine and rest. Denies vision changes. She is feeling quite swollen in her legs, some in her hands and face too. She denies current headache, vision changes, chest pain, shortness of breath, fever, chills, nausea, vomiting or other systemic complaints. She denies vaginal bleeding or loss of fluid and is feeling normal fetal movement.     Her pregnancy has been complicated by:  - s/p exam indicated Olivas cerclage, removed 21  - desires permanent sterilization  - anxiety, depression  - gHTN, now with preE w/SF    ROS: No headaches, vision changes, nausea, vomiting, fevers, chills, chest pain, SOB, abdominal pain, constipation, diarrhea, dysuria, changes in vaginal discharge or edema in extremities noted.     OBHX:   OB History    Para Term  AB Living   1 0 0 0 0 0   SAB IAB Ectopic Multiple Live Births   0 0 0 0 0      # Outcome Date GA Lbr Andres/2nd Weight Sex Delivery Anes PTL Lv   1 Current                Past Medical History:   Diagnosis Date    Anxiety     Depressive disorder        Past Surgical History:   Procedure Laterality Date    APPENDECTOMY  2011    BONE CYST EXCISION      CERCLAGE CERVICAL N/A 2021    Procedure: CERCLAGE, CERVIX, VAGINAL APPROACH;  Surgeon: Miladys Contreras MD;  [facility]    CERCLAGE CERVICAL  2021    REMOVE CERCLAGE N/A 2021    Procedure: REMOVAL, CERCLAGE SUTURE;  Surgeon: Lurdes Taho MD;  [facility]: [facility]    TONSILLECTOMY  2009    TONSILLECTOMY      WISDOM TOOTH EXTRACTION         Medications:   No current facility-administered medications on file prior  to encounter.  docusate sodium (COLACE) 100 MG capsule, Take 100 mg by mouth daily  Prenatal Vit-Fe Fumarate-FA (PRENATAL MULTIVITAMIN W/IRON) 27-0.8 MG tablet, Take 1 tablet by mouth daily  sertraline (ZOLOFT) 50 MG tablet, Take 1 tablet (50 mg) by mouth daily        Allergies   Allergen Reactions    Egg [Egg Shells] Unknown    Amoxicillin Rash    Cefzil [Cefprozil] Rash       History reviewed. No pertinent family history.    SocialHX:   Social History     Tobacco Use    Smoking status: Never Smoker    Smokeless tobacco: Never Used   Substance Use Topics    Alcohol use: Never    Drug use: Never       ROS: 10-point ROS negative except as indicated in HPI.    Physical Exam:    Vitals:    12/16/21 1935 12/16/21 1951 12/16/21 2008   BP: (!) 158/96 (!) 145/78 (!) 168/101   Resp:      Temp:      SpO2:  100%        General: alert, oriented female, resting in bed in NAD  CV: regular rate and rhythm  Lungs: clear bilaterally, no crackles or wheezes  Abdomen: soft, gravid, non-tender, EFW 7#.  Extremities: bilateral lower extremities non-tender with 3+ pitting edema to knees. 2+ reflexes bicep and patellar, 1 beat of clonus bilaterally    SVE: 3/80/-2  Membranes: intact    Presentation: cephalic by BSUS    FHT: baseline 140, moderate variability, + accelerations, no decelerations  [place]: no contractions in 10 minutes.     Prenatal Labs:   Lab Results   Component Value Date    AS Negative 12/16/2021    CHPCRT Negative 08/23/2021    GCPCRT Negative 08/23/2021    HGB 11.6 (L) 12/16/2021       GBS Status:   No results found for: GBS      Labs:   Results for orders placed or performed during the hospital encounter of 12/16/21 (from the past 24 hour(s))   CBC with platelets   Result Value Ref Range    WBC Count 10.1 4.0 - 11.0 10e3/uL    RBC Count 3.85 3.80 - 5.20 10e6/uL    Hemoglobin 11.6 (L) 11.7 - 15.7 g/dL    Hematocrit 33.2 (L) 35.0 - 47.0 %    MCV 86 78 - 100 fL    MCH 30.1 26.5 - 33.0 pg    MCHC 34.9 31.5 - 36.5 g/dL    RDW  13.4 10.0 - 15.0 %    Platelet Count 165 150 - 450 10e3/uL   AST   Result Value Ref Range    AST 29 0 - 45 U/L   ALT   Result Value Ref Range    ALT 29 0 - 50 U/L   Creatinine   Result Value Ref Range    Creatinine 0.60 0.52 - 1.04 mg/dL    GFR Estimate >90 >60 mL/min/1.73m2   ABO/Rh type and screen    Narrative    The following orders were created for panel order ABO/Rh type and screen.  Procedure                               Abnormality         Status                     ---------                               -----------         ------                     Adult Type and Screen[[id number]]                            Final result                 Please view results for these tests on the individual orders.   Adult Type and Screen   Result Value Ref Range    ABO/RH(D) B POS     Antibody Screen Negative Negative    SPECIMEN EXPIRATION DATE [id number]        Assessment/Plan: 31 year old  at 37w0d by 8w1d US, here for elevated BP in clinic. Pregnancy complicated by: s/p Olivas cerclage    # Pre-eclampsia w/severe features  - Serial BP monitoring   - IV Antihypertensives prn for sustained severe range blood pressures (>160/>110), she required PO nifedipine 10mg IR on arrival  - Labs: HELLP labs and UPC pending  - Daily weights, strict I&Os  - Magnesium: 4g loading dose, followed by 2g per hour   - Mg checks q4h   - Will continue for 24hrs post-delivery  - discussed that this is an indication for delivery at this time, will proceed with IOL as below    # Induction of Labor  - Admit for IOL in the setting of preE w/SF as above  - Cervix favorable, Bishops score 8   - Given above Bishops score, will plan to start pitocin  - Membranes: intact  - discussed methods of induction including pitocin and AROM  - Labs: CBC, T&S, RPR  - GBS negative; Antibiotics not indicated.  - Pain Control: Per patient request; Discussed options, desires epidural  - PPH Meds: all medications in the room, avoid pitocin    # anxiety,  depression  - continue PTA sertraline    # satisfied parity  - planning PPTL  - FTP signed 10/28/21    # Fetal well-being  - Category 1 FHT  - continuous fetal monitoring  - intrauterine resuscitation PRN    Patient discussed with Dr. Vasquez.    Dong Tucker MD  OB/GYN PGY-2  12/16/2021 6:25 PM    Appreciate note by Dr. Tucker. Patient has been seen and examined by me separate from the resident, agree with above note.     Lorena Vasquez MD  8:20 PM

## 2021-12-16 NOTE — PROGRESS NOTES
Subjective:     31 year old  at 37w0d presents for a routine prenatal appointment.  No vaginal bleeding,  leakage of fluid, or change in vaginal discharge.  Worsening contractions, about 7 mins apart at times. Very tired.     Patient concerns: Feeling well overall.   - 20lb weight gain in past week. Does feel more swollen.   - Having some headaches on and off for past week. This morning it was worse than ever but improved after caffeine. No visual changes, RUQ or epigastric pain.     - Fetal movement feel not as strong as usual, feels like little bumps instead of big kicks and turns. Called yesterday reporting some movement but not as much.       - Cerclage removed 21, more pelvic pressure since then.   - Reviewed GBS negative. Recent hgb 11.9.     Objective:  Vitals:    21 1519 21 1520 21 1521 21 1527   BP: (!) 163/91 (!) 162/105 (!) 154/103    Pulse:       Weight:    113.2 kg (249 lb 9.6 oz)   Height:        See OB flowsheet    Assessment/Plan     Encounter Diagnoses   Name Primary?     High-risk pregnancy in third trimester Yes     Cervical insufficiency during pregnancy, antepartum      No orders of the defined types were placed in this encounter.    Reviewed blood pressures indicate hypertensive disorder of pregnancy. Discussed that delivery is indicated, but plan of care will be determined after more Bps and labs are completed. Transport called immediately [facility].     Notified [facility] that patient was on the way. Pt has seen MDs and CNMs. Given severe range blood pressures now, patient may be better served transferring to MD care on admission.     KUSHAL Bettencourt CNM

## 2021-12-16 NOTE — LETTER
2021       RE: Yue Teran  [address] [place] [address]     Dear Colleague,    Thank you for referring your patient, Yue Teran, to the [facility] [place] at [facility]. Please see a copy of my visit note below.    Subjective:     31 year old  at 37w0d presents for a routine prenatal appointment.  No vaginal bleeding,  leakage of fluid, or change in vaginal discharge.  Worsening contractions, about 7 mins apart at times. Very tired.     Patient concerns: Feeling well overall.   - 20lb weight gain in past week. Does feel more swollen.   - Having some headaches on and off for past week. This morning it was worse than ever but improved after caffeine. No visual changes, RUQ or epigastric pain.     - Fetal movement feel not as strong as usual, feels like little bumps instead of big kicks and turns. Called yesterday reporting some movement but not as much.       - Cerclage removed 21, more pelvic pressure since then.   - Reviewed GBS negative. Recent hgb 11.9.     Objective:  Vitals:    21 1519 21 1520 21 1521 21 1527   BP: (!) 163/91 (!) 162/105 (!) 154/103    Pulse:       Weight:    113.2 kg (249 lb 9.6 oz)   Height:        See OB flowsheet    Assessment/Plan     Encounter Diagnoses   Name Primary?     High-risk pregnancy in third trimester Yes     Cervical insufficiency during pregnancy, antepartum      No orders of the defined types were placed in this encounter.    Reviewed blood pressures indicate hypertensive disorder of pregnancy. Discussed that delivery is indicated, but plan of care will be determined after more Bps and labs are completed. Transport called immediately [facility].     Notified [facility] that patient was on the way. Pt has seen MDs and CNMs. Given severe range blood pressures now, patient may be better served transferring to MD care  on admission.     KUSHAL Bettencourt CNM

## 2021-12-17 ENCOUNTER — ANESTHESIA (OUTPATIENT)
Dept: OBGYN | Facility: CLINIC | Age: 31
End: 2021-12-17
Payer: COMMERCIAL

## 2021-12-17 ENCOUNTER — ANESTHESIA EVENT (OUTPATIENT)
Dept: OBGYN | Facility: CLINIC | Age: 31
End: 2021-12-17
Payer: COMMERCIAL

## 2021-12-17 LAB
ALT SERPL W P-5'-P-CCNC: 28 U/L (ref 0–50)
AST SERPL W P-5'-P-CCNC: 27 U/L (ref 0–45)
CREAT SERPL-MCNC: 0.6 MG/DL (ref 0.52–1.04)
GFR SERPL CREATININE-BSD FRML MDRD: >90 ML/MIN/1.73M2
HGB BLD-MCNC: 11.7 G/DL (ref 11.7–15.7)
PLATELET # BLD AUTO: 182 10E3/UL (ref 150–450)
T PALLIDUM AB SER QL: NONREACTIVE

## 2021-12-17 PROCEDURE — 120N000002 HC R&B MED SURG/OB UMMC

## 2021-12-17 PROCEDURE — 250N000009 HC RX 250: Performed by: STUDENT IN AN ORGANIZED HEALTH CARE EDUCATION/TRAINING PROGRAM

## 2021-12-17 PROCEDURE — 85018 HEMOGLOBIN: CPT | Performed by: STUDENT IN AN ORGANIZED HEALTH CARE EDUCATION/TRAINING PROGRAM

## 2021-12-17 PROCEDURE — 250N000011 HC RX IP 250 OP 636: Performed by: STUDENT IN AN ORGANIZED HEALTH CARE EDUCATION/TRAINING PROGRAM

## 2021-12-17 PROCEDURE — 82565 ASSAY OF CREATININE: CPT | Performed by: STUDENT IN AN ORGANIZED HEALTH CARE EDUCATION/TRAINING PROGRAM

## 2021-12-17 PROCEDURE — 0UQMXZZ REPAIR VULVA, EXTERNAL APPROACH: ICD-10-PCS | Performed by: OBSTETRICS & GYNECOLOGY

## 2021-12-17 PROCEDURE — 250N000011 HC RX IP 250 OP 636: Performed by: ANESTHESIOLOGY

## 2021-12-17 PROCEDURE — 250N000013 HC RX MED GY IP 250 OP 250 PS 637: Performed by: STUDENT IN AN ORGANIZED HEALTH CARE EDUCATION/TRAINING PROGRAM

## 2021-12-17 PROCEDURE — 00HU33Z INSERTION OF INFUSION DEVICE INTO SPINAL CANAL, PERCUTANEOUS APPROACH: ICD-10-PCS | Performed by: ANESTHESIOLOGY

## 2021-12-17 PROCEDURE — 84460 ALANINE AMINO (ALT) (SGPT): CPT | Performed by: STUDENT IN AN ORGANIZED HEALTH CARE EDUCATION/TRAINING PROGRAM

## 2021-12-17 PROCEDURE — 3E0R3BZ INTRODUCTION OF ANESTHETIC AGENT INTO SPINAL CANAL, PERCUTANEOUS APPROACH: ICD-10-PCS | Performed by: ANESTHESIOLOGY

## 2021-12-17 PROCEDURE — 36415 COLL VENOUS BLD VENIPUNCTURE: CPT | Performed by: STUDENT IN AN ORGANIZED HEALTH CARE EDUCATION/TRAINING PROGRAM

## 2021-12-17 PROCEDURE — 258N000003 HC RX IP 258 OP 636: Performed by: STUDENT IN AN ORGANIZED HEALTH CARE EDUCATION/TRAINING PROGRAM

## 2021-12-17 PROCEDURE — 0HQ9XZZ REPAIR PERINEUM SKIN, EXTERNAL APPROACH: ICD-10-PCS | Performed by: OBSTETRICS & GYNECOLOGY

## 2021-12-17 PROCEDURE — 84450 TRANSFERASE (AST) (SGOT): CPT | Performed by: STUDENT IN AN ORGANIZED HEALTH CARE EDUCATION/TRAINING PROGRAM

## 2021-12-17 PROCEDURE — 10907ZC DRAINAGE OF AMNIOTIC FLUID, THERAPEUTIC FROM PRODUCTS OF CONCEPTION, VIA NATURAL OR ARTIFICIAL OPENING: ICD-10-PCS | Performed by: OBSTETRICS & GYNECOLOGY

## 2021-12-17 PROCEDURE — 3E033VJ INTRODUCTION OF OTHER HORMONE INTO PERIPHERAL VEIN, PERCUTANEOUS APPROACH: ICD-10-PCS | Performed by: OBSTETRICS & GYNECOLOGY

## 2021-12-17 PROCEDURE — 85049 AUTOMATED PLATELET COUNT: CPT | Performed by: STUDENT IN AN ORGANIZED HEALTH CARE EDUCATION/TRAINING PROGRAM

## 2021-12-17 PROCEDURE — 250N000011 HC RX IP 250 OP 636

## 2021-12-17 PROCEDURE — 370N000003 HC ANESTHESIA WARD SERVICE

## 2021-12-17 PROCEDURE — 722N000001 HC LABOR CARE VAGINAL DELIVERY SINGLE

## 2021-12-17 PROCEDURE — 250N000009 HC RX 250: Performed by: ANESTHESIOLOGY

## 2021-12-17 PROCEDURE — 59410 OBSTETRICAL CARE: CPT | Mod: GC | Performed by: OBSTETRICS & GYNECOLOGY

## 2021-12-17 RX ORDER — BUPIVACAINE HYDROCHLORIDE 2.5 MG/ML
10 INJECTION, SOLUTION EPIDURAL; INFILTRATION; INTRACAUDAL ONCE
Status: DISCONTINUED | OUTPATIENT
Start: 2021-12-17 | End: 2021-12-17

## 2021-12-17 RX ORDER — NALBUPHINE HYDROCHLORIDE 10 MG/ML
2.5-5 INJECTION, SOLUTION INTRAMUSCULAR; INTRAVENOUS; SUBCUTANEOUS EVERY 6 HOURS PRN
Status: DISCONTINUED | OUTPATIENT
Start: 2021-12-17 | End: 2021-12-17

## 2021-12-17 RX ORDER — OXYTOCIN/0.9 % SODIUM CHLORIDE 30/500 ML
340 PLASTIC BAG, INJECTION (ML) INTRAVENOUS CONTINUOUS PRN
Status: DISCONTINUED | OUTPATIENT
Start: 2021-12-17 | End: 2021-12-21 | Stop reason: HOSPADM

## 2021-12-17 RX ORDER — FENTANYL/BUPIVACAINE/NS/PF 2-1250MCG
PLASTIC BAG, INJECTION (ML) INJECTION CONTINUOUS PRN
Status: DISCONTINUED | OUTPATIENT
Start: 2021-12-17 | End: 2021-12-17

## 2021-12-17 RX ORDER — OXYTOCIN 10 [USP'U]/ML
INJECTION, SOLUTION INTRAMUSCULAR; INTRAVENOUS
Status: DISCONTINUED
Start: 2021-12-17 | End: 2021-12-17 | Stop reason: HOSPADM

## 2021-12-17 RX ORDER — MISOPROSTOL 200 UG/1
800 TABLET ORAL
Status: DISCONTINUED | OUTPATIENT
Start: 2021-12-17 | End: 2021-12-21 | Stop reason: HOSPADM

## 2021-12-17 RX ORDER — ACETAMINOPHEN 325 MG/1
975 TABLET ORAL ONCE
Status: COMPLETED | OUTPATIENT
Start: 2021-12-17 | End: 2021-12-18

## 2021-12-17 RX ORDER — TRANEXAMIC ACID 10 MG/ML
1 INJECTION, SOLUTION INTRAVENOUS EVERY 30 MIN PRN
Status: DISCONTINUED | OUTPATIENT
Start: 2021-12-17 | End: 2021-12-21 | Stop reason: HOSPADM

## 2021-12-17 RX ORDER — MISOPROSTOL 200 UG/1
400 TABLET ORAL
Status: DISCONTINUED | OUTPATIENT
Start: 2021-12-17 | End: 2021-12-21 | Stop reason: HOSPADM

## 2021-12-17 RX ORDER — LIDOCAINE HYDROCHLORIDE 10 MG/ML
INJECTION, SOLUTION EPIDURAL; INFILTRATION; INTRACAUDAL; PERINEURAL
Status: DISCONTINUED
Start: 2021-12-17 | End: 2021-12-17 | Stop reason: HOSPADM

## 2021-12-17 RX ORDER — IBUPROFEN 800 MG/1
800 TABLET, FILM COATED ORAL EVERY 6 HOURS PRN
Status: DISCONTINUED | OUTPATIENT
Start: 2021-12-17 | End: 2021-12-21 | Stop reason: HOSPADM

## 2021-12-17 RX ORDER — DOCUSATE SODIUM 100 MG/1
100 CAPSULE, LIQUID FILLED ORAL DAILY
Status: DISCONTINUED | OUTPATIENT
Start: 2021-12-17 | End: 2021-12-21 | Stop reason: HOSPADM

## 2021-12-17 RX ORDER — MODIFIED LANOLIN
OINTMENT (GRAM) TOPICAL
Status: DISCONTINUED | OUTPATIENT
Start: 2021-12-17 | End: 2021-12-21 | Stop reason: HOSPADM

## 2021-12-17 RX ORDER — METHYLERGONOVINE MALEATE 0.2 MG/ML
200 INJECTION INTRAVENOUS
Status: DISCONTINUED | OUTPATIENT
Start: 2021-12-17 | End: 2021-12-21 | Stop reason: HOSPADM

## 2021-12-17 RX ORDER — KETOROLAC TROMETHAMINE 30 MG/ML
INJECTION, SOLUTION INTRAMUSCULAR; INTRAVENOUS
Status: COMPLETED
Start: 2021-12-17 | End: 2021-12-17

## 2021-12-17 RX ORDER — OXYTOCIN 10 [USP'U]/ML
10 INJECTION, SOLUTION INTRAMUSCULAR; INTRAVENOUS
Status: DISCONTINUED | OUTPATIENT
Start: 2021-12-17 | End: 2021-12-21 | Stop reason: HOSPADM

## 2021-12-17 RX ORDER — ACETAMINOPHEN 325 MG/1
650 TABLET ORAL EVERY 4 HOURS PRN
Status: DISCONTINUED | OUTPATIENT
Start: 2021-12-17 | End: 2021-12-21 | Stop reason: HOSPADM

## 2021-12-17 RX ORDER — CARBOPROST TROMETHAMINE 250 UG/ML
250 INJECTION, SOLUTION INTRAMUSCULAR
Status: DISCONTINUED | OUTPATIENT
Start: 2021-12-17 | End: 2021-12-21 | Stop reason: HOSPADM

## 2021-12-17 RX ORDER — LIDOCAINE HYDROCHLORIDE AND EPINEPHRINE 15; 5 MG/ML; UG/ML
INJECTION, SOLUTION EPIDURAL PRN
Status: DISCONTINUED | OUTPATIENT
Start: 2021-12-17 | End: 2021-12-17

## 2021-12-17 RX ORDER — BISACODYL 10 MG
10 SUPPOSITORY, RECTAL RECTAL DAILY PRN
Status: DISCONTINUED | OUTPATIENT
Start: 2021-12-17 | End: 2021-12-21 | Stop reason: HOSPADM

## 2021-12-17 RX ORDER — SODIUM CHLORIDE, SODIUM LACTATE, POTASSIUM CHLORIDE, CALCIUM CHLORIDE 600; 310; 30; 20 MG/100ML; MG/100ML; MG/100ML; MG/100ML
INJECTION, SOLUTION INTRAVENOUS CONTINUOUS
Status: DISCONTINUED | OUTPATIENT
Start: 2021-12-17 | End: 2021-12-18

## 2021-12-17 RX ORDER — MISOPROSTOL 200 UG/1
TABLET ORAL
Status: DISCONTINUED
Start: 2021-12-17 | End: 2021-12-17 | Stop reason: HOSPADM

## 2021-12-17 RX ORDER — HYDROCORTISONE 2.5 %
CREAM (GRAM) TOPICAL 3 TIMES DAILY PRN
Status: DISCONTINUED | OUTPATIENT
Start: 2021-12-17 | End: 2021-12-21 | Stop reason: HOSPADM

## 2021-12-17 RX ORDER — FENTANYL CITRATE-0.9 % NACL/PF 10 MCG/ML
100 PLASTIC BAG, INJECTION (ML) INTRAVENOUS EVERY 5 MIN PRN
Status: DISCONTINUED | OUTPATIENT
Start: 2021-12-17 | End: 2021-12-17

## 2021-12-17 RX ORDER — CITRIC ACID/SODIUM CITRATE 334-500MG
30 SOLUTION, ORAL ORAL ONCE
Status: COMPLETED | OUTPATIENT
Start: 2021-12-17 | End: 2021-12-18

## 2021-12-17 RX ORDER — FENTANYL/ROPIVACAINE/NS/PF 2MCG/ML-.1
PLASTIC BAG, INJECTION (ML) EPIDURAL
Status: DISCONTINUED
Start: 2021-12-17 | End: 2021-12-17 | Stop reason: HOSPADM

## 2021-12-17 RX ADMIN — Medication 340 ML/HR: at 09:34

## 2021-12-17 RX ADMIN — SODIUM CHLORIDE, POTASSIUM CHLORIDE, SODIUM LACTATE AND CALCIUM CHLORIDE 75 ML/HR: 600; 310; 30; 20 INJECTION, SOLUTION INTRAVENOUS at 20:44

## 2021-12-17 RX ADMIN — LABETALOL HYDROCHLORIDE 20 MG: 5 INJECTION, SOLUTION INTRAVENOUS at 12:23

## 2021-12-17 RX ADMIN — Medication: at 01:57

## 2021-12-17 RX ADMIN — SERTRALINE HYDROCHLORIDE 50 MG: 50 TABLET ORAL at 21:08

## 2021-12-17 RX ADMIN — MAGNESIUM SULFATE HEPTAHYDRATE 2 G/HR: 40 INJECTION, SOLUTION INTRAVENOUS at 14:23

## 2021-12-17 RX ADMIN — Medication: at 08:13

## 2021-12-17 RX ADMIN — LIDOCAINE HYDROCHLORIDE 10 ML: 10 INJECTION, SOLUTION EPIDURAL; INFILTRATION; INTRACAUDAL; PERINEURAL at 09:37

## 2021-12-17 RX ADMIN — ACETAMINOPHEN 650 MG: 325 TABLET, FILM COATED ORAL at 00:53

## 2021-12-17 RX ADMIN — IBUPROFEN 800 MG: 800 TABLET ORAL at 23:02

## 2021-12-17 RX ADMIN — DOCUSATE SODIUM 100 MG: 100 CAPSULE, LIQUID FILLED ORAL at 16:48

## 2021-12-17 RX ADMIN — Medication: at 08:16

## 2021-12-17 RX ADMIN — SODIUM CHLORIDE, POTASSIUM CHLORIDE, SODIUM LACTATE AND CALCIUM CHLORIDE: 600; 310; 30; 20 INJECTION, SOLUTION INTRAVENOUS at 01:48

## 2021-12-17 RX ADMIN — ACETAMINOPHEN 650 MG: 325 TABLET, FILM COATED ORAL at 20:49

## 2021-12-17 RX ADMIN — LABETALOL HYDROCHLORIDE 40 MG: 5 INJECTION, SOLUTION INTRAVENOUS at 12:46

## 2021-12-17 RX ADMIN — Medication 10 ML/HR: at 01:36

## 2021-12-17 RX ADMIN — KETOROLAC TROMETHAMINE 30 MG: 30 INJECTION, SOLUTION INTRAMUSCULAR; INTRAVENOUS at 10:53

## 2021-12-17 RX ADMIN — LIDOCAINE HYDROCHLORIDE,EPINEPHRINE BITARTRATE 3 ML: 15; .005 INJECTION, SOLUTION EPIDURAL; INFILTRATION; INTRACAUDAL; PERINEURAL at 01:36

## 2021-12-17 RX ADMIN — ACETAMINOPHEN 650 MG: 325 TABLET, FILM COATED ORAL at 08:53

## 2021-12-17 RX ADMIN — ACETAMINOPHEN 650 MG: 325 TABLET, FILM COATED ORAL at 04:52

## 2021-12-17 RX ADMIN — ACETAMINOPHEN 650 MG: 325 TABLET, FILM COATED ORAL at 16:47

## 2021-12-17 RX ADMIN — Medication 6 ML: at 01:36

## 2021-12-17 RX ADMIN — IBUPROFEN 800 MG: 800 TABLET ORAL at 16:48

## 2021-12-17 NOTE — PLAN OF CARE
VSS ex for HTN (re: flowsheets). MD paged, IV labetalol given X2 with pressures decreasing.     Postpartum assessments WDL. Fundus firm at U with light bleeding. Pt on mag, clonus absent and reflexes +2, denies any symptoms. Strict I&O. Bonding well with baby. Plans to exclusively breastfeed, has pump at home already. Pain management includes ibuprofen and tylenol, pt complaining of some cramping and soreness. , Red, at bedside and supportive. Continue per POC

## 2021-12-17 NOTE — PROGRESS NOTES
"OBGYN Progress Note    S: Pushing, comfortable with an epidural.     O:   BP (!) 144/88 (BP Location: Left arm)   Temp 97.4  F (36.3  C) (Oral)   Resp 16   Ht 1.702 m (5' 7\")   Wt 112.9 kg (249 lb)   LMP 2021   SpO2 97%   BMI 39.00 kg/m      Gen: NAD  SVE: complete/+3, MILE  FHT: 125/moderate/+accels/+one isolated variable decel with contraction  [place]: q2-3m    A/P:    at 37w1d here for IOL 2/2 pre-e with SF.    Pushing well with good progress and good effort. FHT category 2 but overall reassuring with excellent variability, no concern for acidemia at this time. Anticipate .     Shirin Hahn MD, MSCI    [facility]/OBGYN  "

## 2021-12-17 NOTE — ANESTHESIA PROCEDURE NOTES
Epidural catheter Procedure Note    Pre-Procedure   Staff -        Anesthesiologist:  Adali Gonzales MD       Performed By: anesthesiologist       Location: OB       Procedure Start/Stop Times: 12/17/2021 1:30 AM and 12/17/2021 2:02 AM       Pre-Anesthestic Checklist: patient identified, IV checked, risks and benefits discussed, informed consent, monitors and equipment checked, pre-op evaluation, at physician/surgeon's request and post-op pain management  Timeout:       Correct Patient: Yes        Correct Procedure: Yes        Correct Site: Yes        Correct Position: Yes   Procedure Documentation  Procedure: epidural catheter       Patient Position: sitting       Skin prep: Chloraprep       Insertion Site: L3-4. (midline approach).       Technique: LORT saline        Needle Type: People[place]ubey needle       Needle Gauge: 17.        Needle Length (Inches): 3.5        Catheter: 19 G.         Catheter threaded easily.         4 cm epidural space.         Threaded 12 cm at skin.         # of attempts: 1 and  # of redirects:  0    Assessment/Narrative         Paresthesias: No.      Test dose of 3 mL at 01:40 CST.         Test dose not negative, 3 minutes after injection, for signs of intravascular, subdural, or intrathecal injection.       Insertion/Infusion Method: LORT saline       No aspiration negative for Heme or CSF via Epidural Catheter.    Medication(s) Administered   0.125% Bupivacaine + 2 mcg/mL Fentanyl via CADD (Epidural), 10 mL  Medication Administration Time: 12/17/2021 1:44 AM

## 2021-12-17 NOTE — PLAN OF CARE
Pt arrived from clinic for IOL for GHTN. Pt reports have severe range BP in clinic. HA intermittently for the past few weeks that improve with caffeine and rest. Denies vision changes, RUQ, chest pain, shortness of breath, N/V, LOF and vaginal bleeding. Has significant swelling in her legs and feet and some swelling in her hands. Feeling baby move a little less than a couple weeks ago but is still feeling her move daily. Pt leaving urine sample and will sit for 10 minutes prior to checking vitals. Paged Dr. Tucker to inform of patient arrival and to come to bedside to discuss IOL.

## 2021-12-17 NOTE — PROGRESS NOTES
Data: Yue Teran transferred to 7142 via wheelchair at 1145. Baby transferred via parent's arms.  Action: Receiving unit notified of transfer: Yes. Patient and family notified of room change. Report given to Morena RODRIGUEZ at 1130. Belongings sent to receiving unit. Accompanied by Registered Nurse. Oriented patient to surroundings. Call light within reach. ID bands double-checked with receiving RN.  Response: Patient tolerated transfer and is stable.

## 2021-12-17 NOTE — PROGRESS NOTES
Labor Note    S: Comfortable with epidural. Denies headache.   O:   Vitals:    21 0156 21 0200 21 0202   BP: 125/67 126/65 (!) 149/82   Resp: 18 16 18   Temp:      SpO2:   97%     FHT: 135 baseline, moderate variability, accels present, decels absent, cat 1  TOCO: Ctx Q3-5min  SVe: 4/90/-1 AROM performed clear fluid    A/P: 31 year old  @37w1d IOL for preeclampsia with SF  IOL: On pitocin @18 mu/min, continue to titrate for adequate labor  Pre-e: On magnesium, s/p PO nifedipine and IV labetalol x1. Continue to monitor closely.   GBS: Negative  Anticipate NVSD    Lorena Vasquez MD

## 2021-12-17 NOTE — PROGRESS NOTES
[facility]  Labor Progress/Magnesium Check Note    Subjective:  Patient is doing well. Denies headache, vision changes, chest pain, SOB, RUQ pain, or worsening edema. She endorses edema that has increased in the past couple weeks and is somewhat painful when she moves her legs.    Objective:   Patient Vitals for the past 8 hrs:   BP Temp Temp src Resp   21 1839 (!) 147/92 -- -- 18   21 1823 (!) 163/97 -- -- 18   21 1817 (!) 143/86 -- -- 16   21 1812 (!) 153/87 -- -- 16   21 1807 (!) 156/88 -- -- 16   21 1802 (!) 149/85 -- -- 18   21 1753 (!) 146/84 -- -- --   21 1743 (!) 144/83 -- -- --   21 1733 138/80 -- -- 16   21 1724 134/78 -- -- 16   21 1712 (!) 172/101 -- -- 18   21 1652 (!) 165/89 -- -- 18   21 1637 (!) 166/94 98.4  F (36.9  C) Oral 18   21 1604 (!) 157/96 -- -- 18       Gen: appears well, NAD  Resp: CTAB  CV: RRR with no murmurs  Abdomen: soft, gravid, non-tender  Ext: warm, well perfused, 2+ edema  Neuro: 2+ biceps and brachioradialis reflexes    SVE: deferred  Membranes: intact    FHT: Baseline 130, mod variability, + accelerations, no decelerations  [place]: 1-2 contractions in 10 minutes    Assessment/Plan:  Ms. Yue Teran is a 31 year old  at 37w0d by 8w1d US admitted for IOL for preE with SF. Pregnancy c/b exam-indicated [place] cerclage (removed ) and anxiety/depression. She desires a postpartum tubal ligation and signed FTP on 10/28.    # Labor  - Cervical ripening: 3/80/-2, Sp[place] score 8 on admission  - Augmentation   - IV pitocin, currently at 4 mu/min, continue to titrate per protocol  - Membranes: intact  - Pain: per maternal request. Plans epidural in active labor  - Anticipate     # Pre-eclampsia with SF (BP)  - Serial BP monitoring  - IV Antihypertensives prn for sustained severe range blood pressures (>160/>110)   - s/p PO nifedipine 10 mg IR (9963)  - Labs: HELLP  labs wnl, UPC 0.25  - Daily weights, strict I&Os   - UOP adequate, 450 mL out since admission  - Magnesium: 4g loading dose (1752) > 2g per hour until 24 hours postpartum   - Clinical Mg checks q4h  - No signs of worsening preE or Mg toxicity    # Fetal Well Being  - Category I FHT. Reactive and reassuring  - Cephalic by BSUS. EFW 7#  - Continue EFM and [place]    # Postpartum - plans PPTL, FTP signed 10/28    Esther Rico MD  OB/GYN, PGY-2  12/16/2021, 7:33 PM

## 2021-12-17 NOTE — L&D DELIVERY NOTE
Delivery Summary    Yue Teran MRN# [id number]   Age: 31 year old YOB: 1990     Delivery Summary    Yue Teran is a 31 year old now  at 37w1d, admitted for IOL for preeclampsia with severe features. She was induced with pitocin and augmented with AROM. She received an epidural for pain control, progressed to complete dilation, pushed and with good maternal effort delivered a female infant on 2021 at 0923 from the OA position. Shoulders delivered easily. Infant with spontaneous cry. Placed on mother's chest. Cord clamping delayed 1min. Apgars 8/9, weight 3100g. IV pitocin started. Placenta delivered with fundal massage, gentle cord traction and suprapubic countertraction; noted to be intact with 3 vessel cord. 1st degree perineal laceration and right labial laceration, repaired with 3-0 Vicryl. Fundus firm and lochia minimal at the end of the case. .    Dr. Hahn was present and gloved for entire delivery and repair.    Rosemarie Sin MD  Obstetrics & Gynecology, PGY-2  2021 12:21 PM       [place] Female-Yue [[id number]]    Labor Event Times    Dilation complete date: 21 Complete time:  8:21 AM   Start pushing date/time: 2021 0827      Labor Length    2nd Stage (hrs): 1 (min): 2   3rd Stage (hrs): 0 (min): 15      Labor Events     labor?: No   steroids: None  Labor Type: Induction/Cervical ripening  Predominate monitoring during 1st stage: continuous electronic fetal monitoring     Antibiotics received during labor?: No     Rupture identifier: Sac 1  Rupture date/time: 21 0203   Rupture type: Artificial Rupture of Membranes  Fluid color: Clear  Fluid odor: Normal     Induction: Oxytocin  Induction date/time:     Cervical ripening date/time:        Augmentation: AROM  Indications for augmentation: Preeclampsia  1:1 continuous labor support provided by?: none Labor partogram used?: no      Delivery/Placenta Date and Time    Delivery  "Date: 21 Delivery Time:  9:23 AM   Placenta Date/Time: 2021  9:38 AM  Oxytocin given at the time of delivery: after delivery of baby  Delivering clinician: Shirin Hahn MD   Other personnel present at delivery:  Provider Role   Rosemarie Sin MD Resident   Shirin Hahn MD Obstetrician   Valentine Quiles Medical Student         Vaginal Counts     Initial count performed by 2 team members:  Two Team Members   Dr. Jovani Kearney, RN       Needles Suture Needles Sponges (RETIRED) Instruments   Initial counts 2  5    Added to count  2 5    Relief counts       Final counts 2 2 10          Placed during labor Accounted for at the end of labor   FSE No    IUPC No    Cervadil No               Final count performed by 2 team members:  Two Team Members   Dr. Jovani Kearney RN      Final count correct?: Yes     Apgars    Living status: Living   1 Minute 5 Minute 10 Minute 15 Minute 20 Minute   Skin color: 1  1       Heart rate: 2  2       Reflex irritability: 2  2       Muscle tone: 2  2       Respiratory effort: 1  2       Total: 8  9       Apgars assigned by: TALIA RN     Cord    Vessels: 3 Vessels    Cord Complications: None               Gases Sent?: No    Stem cell collection?: No       [place] Resuscitation    [place] Care at Delivery: Infant dried and stimulated at mother's abdomen, spontaneous cry.      Measurements    Weight: 6 lb 13.4 oz Length: 1' 8.5\"   Head circumference: 33 cm       Labor Events and Shoulder Dystocia    Fetal Tracing Prior to Delivery: Category 1  Shoulder dystocia present?: Neg     Delivery (Maternal) (Provider to Complete) ([id number])    Episiotomy: None  Perineal lacerations: 1st Repaired?: Yes   Labial laceration: right Repaired?: Yes   Repair suture: 3-0 Vicryl  Genital tract inspection done: Pos     Blood Loss  Mother: Yue Teran #[id number]   Start of Mother's Information    Delivery Blood Loss  21 2123 - 21 1221    Delivery QBL (mL) " Hospital Encounter 437 mL    Total  437 mL         End of Mother's Information  Mother: Yue Teran #[id number]          Delivery - Provider to Complete ([id number])    Delivering clinician: Shirin Hahn MD  Attempted Delivery Types (Choose all that apply): Spontaneous Vaginal Delivery  Delivery Type (Choose the 1 that will go to the Birth History): Vaginal, Spontaneous                   Other personnel:  Provider Role   Rosemarie Sin MD Resident   Shirin Hahn MD Obstetrician   Valentine Quiles Medical Student                Placenta    Date/Time: 12/17/2021  9:38 AM  Removal: Spontaneous  Disposition: Hospital disposal           Anesthesia    Method: Epidural  Cervical dilation at placement: 4-7                Presentation and Position    Presentation: Vertex     Occiput Anterior                 Rosemarie Sin MD

## 2021-12-17 NOTE — PLAN OF CARE
Tylenol given for HA patient was rating 6/10. Pain then decreased to 2/10. Patient now stating HA is slowly starting to get worse again, rating it currently 4/10. BP slowly rising again. BP has been responsive to both nifedipine PO and Labetalol IV. Patient reporting contractions are getting stronger. Currently ctxing Q 6-9 minutes palpating moderate. Pitocin currently at 10mL/hr.   FHT's Category 1. No LOF. No vaginal bleeding. All other VS WNL. LE edema slightly worse since admission. Reflexes normal with 1 beat clonus.

## 2021-12-17 NOTE — PLAN OF CARE
Patient arrived to [facility] unit via wheelchair at 1150,with belongings, accompanied by spouse/ significant other, with infant in arms. Received report from Chaparrita RODRIGUEZ and Mulu RODRIGUEZ and checked bands. Unit and room orientation started. Call light within arms reach; no concerns present at this time. Continue with plan of care.      Elevated BP upon arrival and recheck, resident MD paged (re: provider notification note)

## 2021-12-17 NOTE — PROGRESS NOTES
[facility]  Labor Progress/Magnesium Check Note    Subjective:  Patient is doing well, comfortable with epidural. Feeling constant pressure, requests cervical exam. Headache improved with Tylenol. She has been able to get some rest. Denies vision changes, chest pain, SOB, RUQ pain, or worsening edema. She endorses hand edema and leg edema up past her knees, not sure if it has changed.    Objective:   Patient Vitals for the past 8 hrs:   BP Temp Temp src Resp SpO2   21 0246 (!) 146/84 97.7  F (36.5  C) -- 16 96 %   21 0233 137/87 -- -- 16 97 %   21 0202 (!) 149/82 -- -- 18 97 %   21 0200 126/65 -- -- 16 --   21 0156 125/67 -- -- 18 --   21 0143 137/78 -- -- 16 98 %   21 0138 138/82 -- -- 16 --   21 0036 (!) 156/96 -- -- -- 99 %   21 2338 (!) 154/93 -- -- 16 99 %   21 2309 (!) 145/93 -- -- 18 99 %   21 2238 (!) 147/87 -- -- -- 98 %   21 2223 (!) 147/81 -- -- -- 99 %   21 (!) 159/88 -- -- 16 --   21 (!) 156/89 -- -- -- --   213 (!) 157/92 97.8  F (36.6  C) Oral 16 --   21 (!) 157/85 -- -- -- 100 %   21 (!) 155/85 -- -- -- 100 %       Gen: appears well, NAD  Resp: CTAB  CV: RRR with no murmurs  Abdomen: soft, gravid, non-tender  Ext: warm, well perfused, 2+ edema  Neuro: 2+ biceps and brachioradialis reflexes    SVE: 6/90/-1  Membranes: s/p AROM    FHT: Baseline 125, mod variability, + accelerations, no decelerations  [place]: 1-2 contractions in 10 minutes    I/O last 3 completed shifts:  In: 1877.65 [P.O.:1475; I.V.:402.65]  Out: 1050 [Urine:1050]    Assessment/Plan:  Ms. Yue Teran is a 31 year old  at 37w1d by 8w1d US admitted for IOL for preE with SF. Pregnancy c/b exam-indicated [place] cerclage (removed ) and anxiety/depression. She desires a postpartum tubal ligation and signed FTP on 10/28.    # Induction of Labor  - Augmentation   - IV pitocin,  currently at 22 mu/min, continue to titrate per protocol  - Membranes: s/p AROM at 0203  - Pain: epidural in place, working well  - Anticipate     # Pre-eclampsia with SF (BP)  - Serial BP monitoring  - BP currently normal to mild range  - IV Antihypertensives prn for sustained severe range blood pressures (>160/>110)   - s/p PO nifedipine 10 mg IR (), labetalol 20 ()  - Labs: HELLP labs wnl, UPC 0.25  - Daily weights, strict I&Os   - UOP adequate, 80 ml/hr  - Magnesium: 4g loading dose () > 2g per hour until 24 hours postpartum   - Clinical Mg checks q4h  - No signs of worsening preE or Mg toxicity    # Fetal Well Being  - Category I FHT. Reactive and reassuring  - Cephalic by BSUS. EFW 7#  - Continue EFM and [place]    # Postpartum - plans PPTL, FTP signed 10/28    Esther Rico MD  OB/GYN, PGY-2  2021, 5:16 AM

## 2021-12-17 NOTE — PROGRESS NOTES
*Documentation delayed due to patient care*    [facility]  Labor Progress/Magnesium Check Note    Subjective:  Patient is doing well, comfortable with epidural. Feeling constant pressure, requests cervical exam. Headache present improved with Tylenol. Denies vision changes, chest pain, SOB, RUQ pain, or worsening edema.     Objective:   Patient Vitals for the past 8 hrs:   BP Temp Temp src Pulse Resp SpO2   21 1212 (!) 165/106 97.6  F (36.4  C) Oral 74 16 --   21 1154 (!) 169/101 -- -- -- -- --   21 1110 136/86 -- -- -- -- 100 %   21 1055 126/85 -- -- -- -- 98 %   21 1025 (!) 152/85 -- -- -- -- 98 %   21 1010 (!) 154/82 -- -- -- -- 99 %   21 0955 (!) 148/84 -- -- -- -- 99 %   21 0939 (!) 144/81 97.8  F (36.6  C) Oral -- 16 --   21 0842 (!) 158/93 -- -- -- -- 96 %   21 0750 (!) 144/88 97.4  F (36.3  C) Oral -- 16 97 %   21 0646 136/84 97.5  F (36.4  C) Oral -- 16 97 %   21 0548 123/74 97.7  F (36.5  C) Oral -- 16 96 %   21 0436 -- 97.4  F (36.3  C) Oral -- -- --       Gen: appears well, NAD  Resp: CTAB  CV: RRR with no murmurs  Abdomen: soft, gravid, non-tender  Ext: warm, well perfused, 2+ edema  Neuro: 2+ brachioradialis reflexes, 1+ clonus b/l    SVE: 10/100/0  Membranes: s/p AROM    FHT: Baseline 125, mod variability, + accelerations, no decelerations  [place]: 4 contractions in 10 minutes    I/O last 3 completed shifts:  In: 2996.75 [P.O.:1975; I.V.:1021.75]  Out:  [Urine:]    Assessment/Plan:  Ms. Yue Teran is a 31 year old  at 37w1d by 8w1d US admitted for IOL for preE with SF. Pregnancy c/b exam-indicated [place] cerclage (removed ) and anxiety/depression. She desires a postpartum tubal ligation and signed FTP on 10/28.    # Induction of Labor  - Augmentation   - IV pitocin continue to titrate per protocol. Will start pushing now.  - Membranes: s/p AROM at 0203  - Pain: epidural in  place, working well  - Anticipate     # Pre-eclampsia with SF (BP)  - Serial BP monitoring  - BP currently normal to mild range  - IV Antihypertensives prn for sustained severe range blood pressures (>160/>110)   - s/p PO nifedipine 10 mg IR (), labetalol 20 ()  - Labs: HELLP labs wnl, UPC 0.25  - Daily weights, strict I&Os   - UOP adequate  - Magnesium: 4g loading dose () > 2g per hour until 24 hours postpartum   - Clinical Mg checks q4h  - No signs of worsening preE or Mg toxicity    # Fetal Well Being  - Category I FHT. Reactive and reassuring  - Cephalic by BSUS. EFW 7#  - Continue EFM and [place]    # Postpartum - plans PPTL, FTP signed 10/28    Rosemarie Sin MD  Obstetrics & Gynecology, PGY-2  2021 12:15 PM

## 2021-12-17 NOTE — PROGRESS NOTES
"Brief Gyn Progress Note     S: Patient is doing well. She is having some increased blood pressures in the postpartum period and is being started on magnesium. Gyn team consulted for postpartum tubal ligation. Patient states she is still sure that she would like this procedure done.     O:   Vital signs:  Temp: 97.6  F (36.4  C) Temp src: Oral BP: (!) 161/94 Pulse: 78   Resp: 16 SpO2: 100 %     Height: 170.2 cm (5' 7\") Weight: 112.9 kg (249 lb)  Estimated body mass index is 39 kg/m  as calculated from the following:    Height as of this encounter: 1.702 m (5' 7\").    Weight as of this encounter: 112.9 kg (249 lb).    Gen: [place], NAD  CV: well perfused  Lungs: no increased work of breathing   Extremities: trace edema     A/P   #Satisfied Parity  -Federal papers signed 10/2021. I have verified that these papers are correct and in chart. Discussed risks and benefits of procedure with patient including risks of failure, regret, and future ectopic pregnancies. Patient expresses agreement and wishes to proceed with surgery.   -Plan for postpartum bilateral tubal ligation, will call OR to get on schedule for tomorrow 12/18    Melisa Morejon MD MPH  12/17/21  12:57 PM    "

## 2021-12-17 NOTE — ANESTHESIA PREPROCEDURE EVALUATION
Anesthesia Pre-Procedure Evaluation    Patient: Yue Teran   MRN: [id number] : 1990        Preoperative Diagnosis: * No surgery found *    Procedure :           Past Medical History:   Diagnosis Date     Anxiety      Depressive disorder       Past Surgical History:   Procedure Laterality Date     APPENDECTOMY  2011     BONE CYST EXCISION  2012     CERCLAGE CERVICAL N/A 2021    Procedure: CERCLAGE, CERVIX, VAGINAL APPROACH;  Surgeon: Miladys Contreras MD;  Location: [facility]     CERCLAGE CERVICAL  2021     REMOVE CERCLAGE N/A 2021    Procedure: REMOVAL, CERCLAGE SUTURE;  Surgeon: Lurdes Thao MD;  Location: [facility]     TONSILLECTOMY  2009     TONSILLECTOMY       WISDOM TOOTH EXTRACTION        Allergies   Allergen Reactions     Egg [Egg Shells] Unknown     Amoxicillin Rash     Cefzil [Cefprozil] Rash      Social History     Tobacco Use     Smoking status: Never Smoker     Smokeless tobacco: Never Used   Substance Use Topics     Alcohol use: Never      Wt Readings from Last 1 Encounters:   21 112.9 kg (249 lb)        Anesthesia Evaluation   Pt has had prior anesthetic. Type: General and Regional.        ROS/MED HX  ENT/Pulmonary:       Neurologic:       Cardiovascular:     (+) hypertension-----    METS/Exercise Tolerance:     Hematologic:       Musculoskeletal:       GI/Hepatic:       Renal/Genitourinary:       Endo:       Psychiatric/Substance Use:       Infectious Disease:       Malignancy:       Other:            Physical Exam    Airway        Mallampati: II   TM distance: > 3 FB   Neck ROM: full   Mouth opening: > 3 cm    Respiratory Devices and Support         Dental  no notable dental history         Cardiovascular   cardiovascular exam normal          Pulmonary   pulmonary exam normal                OUTSIDE LABS:  CBC:   Lab Results   Component Value Date    WBC 10.1 2021    WBC 10.6 2021    HGB 11.6 (L) 2021    HGB 11.9 2021    HCT 33.2 (L)  12/16/2021    HCT 35.0 12/04/2021     12/16/2021     12/04/2021     BMP:   Lab Results   Component Value Date     12/09/2021     12/04/2021    POTASSIUM 3.8 12/09/2021    POTASSIUM 3.4 12/04/2021    CHLORIDE 109 12/09/2021    CHLORIDE 106 12/04/2021    CO2 25 12/09/2021    CO2 22 12/04/2021    BUN 11 12/09/2021    BUN 11 12/04/2021    CR 0.60 12/16/2021    CR 0.70 12/09/2021    GLC 76 12/09/2021    GLC 94 12/04/2021     COAGS: No results found for: PTT, INR, FIBR  POC:   Lab Results   Component Value Date    HCGS Negative 09/30/2019     HEPATIC:   Lab Results   Component Value Date    ALBUMIN 2.4 (L) 12/04/2021    PROTTOTAL 6.0 (L) 12/04/2021    ALT 29 12/16/2021    AST 29 12/16/2021    ALKPHOS 166 (H) 12/04/2021    BILITOTAL 0.2 12/04/2021     OTHER:   Lab Results   Component Value Date    CAROLINE 8.6 12/09/2021    PHOS 3.9 12/09/2021    MAG 1.5 (L) 12/09/2021    TSH 1.80 06/25/2019       Anesthesia Plan    ASA Status:  2   NPO Status:  NPO Appropriate    Anesthesia Type: Epidural.              Consents    Anesthesia Plan(s) and associated risks, benefits, and realistic alternatives discussed. Questions answered and patient/representative(s) expressed understanding.    - Discussed:     - Discussed with:  Patient, Spouse         Postoperative Care            Comments:    Other Comments: Patient does not  Want residents to provide her care. I explained to her the department policy on this but apparently had a traumatic experience last week.            Adali Parra MD

## 2021-12-17 NOTE — PROVIDER NOTIFICATION
12/17/21 1219   Provider Notification   Provider Name/Title Merrick   Method of Notification Electronic Page   Request Evaluate-Remote   Notification Reason Vital Signs Change  (FYI: /101, recheck 165/106. Giving 20mg IV labetalol)

## 2021-12-17 NOTE — PROGRESS NOTES
"[facility]  Labor Progress/Magnesium Check Note    Subjective:  Patient is doing okay. Headache has increased, currently 7/10. It improved with the Tylenol, then worsened and did not improve with Reglan/Benadryl. Denies vision changes, although upon initial admission she did see some bright spots in her vision. Denies chest pain, SOB, RUQ pain, or worsening edema. She endorses hand edema and leg edema up past her knees.    Objective:   Patient Vitals for the past 8 hrs:   BP Temp Temp src Resp SpO2 Height Weight   12/16/21 2338 (!) 154/93 -- -- 16 99 % -- --   12/16/21 2309 (!) 145/93 -- -- 18 99 % -- --   12/16/21 2238 (!) 147/87 -- -- -- 98 % -- --   12/16/21 2223 (!) 147/81 -- -- -- 99 % -- --   12/16/21 2208 (!) 159/88 -- -- 16 -- -- --   12/16/21 2200 (!) 156/89 -- -- -- -- -- --   12/16/21 2143 (!) 157/92 97.8  F (36.6  C) Oral 16 -- -- --   12/16/21 2128 (!) 157/85 -- -- -- 100 % -- --   12/16/21 2120 (!) 155/85 -- -- -- 100 % -- --   12/16/21 2109 (!) 156/89 -- -- -- 100 % -- --   12/16/21 2058 (!) 142/74 -- -- -- 99 % -- --   12/16/21 2049 (!) 154/92 -- -- 16 100 % -- --   12/16/21 2024 (!) 161/94 -- -- -- 100 % -- --   12/16/21 2008 (!) 168/101 -- -- -- -- -- --   12/16/21 1951 (!) 145/78 -- -- -- 100 % -- --   12/16/21 1935 (!) 158/96 -- -- -- -- -- --   12/16/21 1915 (!) 161/93 -- -- -- -- -- --   12/16/21 1839 (!) 147/92 -- -- 18 -- -- --   12/16/21 1823 (!) 163/97 -- -- 18 -- -- --   12/16/21 1817 (!) 143/86 -- -- 16 -- -- --   12/16/21 1812 (!) 153/87 -- -- 16 -- -- --   12/16/21 1807 (!) 156/88 -- -- 16 -- -- --   12/16/21 1802 (!) 149/85 -- -- 18 -- -- --   12/16/21 1753 (!) 146/84 -- -- -- -- -- --   12/16/21 1743 (!) 144/83 -- -- -- -- -- --   12/16/21 1733 138/80 -- -- 16 -- -- --   12/16/21 1724 134/78 -- -- 16 -- -- --   12/16/21 1712 (!) 172/101 -- -- 18 -- -- --   12/16/21 1652 (!) 165/89 -- -- 18 -- 1.702 m (5' 7\") 112.9 kg (249 lb)   12/16/21 1637 (!) 166/94 98.4 "  F (36.9  C) Oral 18 -- -- --       Gen: appears well, NAD  Resp: CTAB  CV: RRR with no murmurs  Abdomen: soft, gravid, non-tender  Ext: warm, well perfused, 2+ edema  Neuro: 2+ biceps and brachioradialis reflexes    SVE: 4/90/-2, well-applied  Membranes: intact    FHT: Baseline 135, mod variability, + accelerations, no decelerations  Ringoes: 1-2 contractions in 10 minutes    Assessment/Plan:  Ms. Yue Teran is a 31 year old  at 37w1d by 8w1d US admitted for IOL for preE with SF. Pregnancy c/b exam-indicated Olivas cerclage (removed ) and anxiety/depression. She desires a postpartum tubal ligation and signed FTP on 10/28.    # Labor  - Cervical ripening: Spicer score 8 on admission  - Augmentation   - IV pitocin, currently at 16 mu/min, continue to titrate per protocol  - Membranes: intact, plan to AROM after epidural  - Pain: desires epidural now to prepare for AROM  - Anticipate     # Pre-eclampsia with SF (BP)  - Serial BP monitoring  - BP currently mild range  - IV Antihypertensives prn for sustained severe range blood pressures (>160/>110)   - s/p PO nifedipine 10 mg IR (), labetalol 20 ()  - Labs: HELLP labs wnl, UPC 0.25  - Daily weights, strict I&Os   - UOP adequate, 1400 mL out since admission  - Magnesium: 4g loading dose (175) > 2g per hour until 24 hours postpartum   - Clinical Mg checks q4h  - No signs of worsening preE or Mg toxicity    # Fetal Well Being  - Category I FHT. Reactive and reassuring  - Cephalic by BSUS. EFW 7#  - Continue EFM and Ringoes    # Postpartum - plans PPTL, FTP signed 10/28    Esther Rico MD  OB/GYN, PGY-2  2021, 12:36 AM

## 2021-12-17 NOTE — PLAN OF CARE
Yue is progressing in labor. BP 120s-150s/60s-90s, afebrile. See flowsheet for FHT and UC documentaion. Pain managed with epidural. AROM for clear fluids at 0203. 0514 SVE, 6/90/-1. Tolerating 2g/hr of Magnesium, Bilateral 2+ reflex with 2 beats of clonus. Pitocin infusing at 22ml/hr. Adequate urine output. Spouse at bedside and supportive. Will continue to monitor closely.

## 2021-12-17 NOTE — PROGRESS NOTES
Vaginal Delivery Note   of viable Female with Dr. Hahn  in attendance.  NICU/Nursery RN Naomi Cardozo present.  Infant with spontaneous cry, to mother's abdomen, dried and stimulated.  APGAR at 1 minute:  8 and APGAR at 5 minutes:  9.  Placenta delivered with out complication, none, 1st degree laceration,  with repair, cynthia cares provided.  Mother and baby in stable condition.

## 2021-12-17 NOTE — PLAN OF CARE
BPs WNL this evening. Magnesium continues to infuse @ 2gm/hr. Pt denies HA, epigastric pain or vision changes. +2 reflexes & no clonus bilaterally. Up to the bathroom with minimal to no assist, tolerated very well. Breastfeeding baby with staff assist.  here & very supportive. Bonding well with baby.

## 2021-12-18 ENCOUNTER — ANESTHESIA EVENT (OUTPATIENT)
Dept: SURGERY | Facility: CLINIC | Age: 31
End: 2021-12-18
Payer: COMMERCIAL

## 2021-12-18 ENCOUNTER — ANESTHESIA (OUTPATIENT)
Dept: SURGERY | Facility: CLINIC | Age: 31
End: 2021-12-18
Payer: COMMERCIAL

## 2021-12-18 LAB
ALT SERPL W P-5'-P-CCNC: 21 U/L (ref 0–50)
AST SERPL W P-5'-P-CCNC: 28 U/L (ref 0–45)
CREAT SERPL-MCNC: 0.67 MG/DL (ref 0.52–1.04)
ERYTHROCYTE [DISTWIDTH] IN BLOOD BY AUTOMATED COUNT: 14.1 % (ref 10–15)
GFR SERPL CREATININE-BSD FRML MDRD: >90 ML/MIN/1.73M2
HCT VFR BLD AUTO: 30.3 % (ref 35–47)
HGB BLD-MCNC: 10.2 G/DL (ref 11.7–15.7)
MCH RBC QN AUTO: 30.1 PG (ref 26.5–33)
MCHC RBC AUTO-ENTMCNC: 33.7 G/DL (ref 31.5–36.5)
MCV RBC AUTO: 89 FL (ref 78–100)
PLATELET # BLD AUTO: 152 10E3/UL (ref 150–450)
RBC # BLD AUTO: 3.39 10E6/UL (ref 3.8–5.2)
WBC # BLD AUTO: 11.6 10E3/UL (ref 4–11)

## 2021-12-18 PROCEDURE — 250N000009 HC RX 250: Performed by: OBSTETRICS & GYNECOLOGY

## 2021-12-18 PROCEDURE — 85027 COMPLETE CBC AUTOMATED: CPT | Performed by: STUDENT IN AN ORGANIZED HEALTH CARE EDUCATION/TRAINING PROGRAM

## 2021-12-18 PROCEDURE — 120N000002 HC R&B MED SURG/OB UMMC

## 2021-12-18 PROCEDURE — 88302 TISSUE EXAM BY PATHOLOGIST: CPT | Mod: TC | Performed by: OBSTETRICS & GYNECOLOGY

## 2021-12-18 PROCEDURE — 82565 ASSAY OF CREATININE: CPT | Performed by: STUDENT IN AN ORGANIZED HEALTH CARE EDUCATION/TRAINING PROGRAM

## 2021-12-18 PROCEDURE — 250N000011 HC RX IP 250 OP 636: Performed by: NURSE ANESTHETIST, CERTIFIED REGISTERED

## 2021-12-18 PROCEDURE — 250N000013 HC RX MED GY IP 250 OP 250 PS 637: Performed by: STUDENT IN AN ORGANIZED HEALTH CARE EDUCATION/TRAINING PROGRAM

## 2021-12-18 PROCEDURE — 250N000011 HC RX IP 250 OP 636: Performed by: ANESTHESIOLOGY

## 2021-12-18 PROCEDURE — 84460 ALANINE AMINO (ALT) (SGPT): CPT | Performed by: STUDENT IN AN ORGANIZED HEALTH CARE EDUCATION/TRAINING PROGRAM

## 2021-12-18 PROCEDURE — 250N000009 HC RX 250: Performed by: NURSE ANESTHETIST, CERTIFIED REGISTERED

## 2021-12-18 PROCEDURE — 84450 TRANSFERASE (AST) (SGOT): CPT | Performed by: STUDENT IN AN ORGANIZED HEALTH CARE EDUCATION/TRAINING PROGRAM

## 2021-12-18 PROCEDURE — 58605 DIVISION OF FALLOPIAN TUBE: CPT | Mod: GC | Performed by: OBSTETRICS & GYNECOLOGY

## 2021-12-18 PROCEDURE — 36415 COLL VENOUS BLD VENIPUNCTURE: CPT | Performed by: STUDENT IN AN ORGANIZED HEALTH CARE EDUCATION/TRAINING PROGRAM

## 2021-12-18 PROCEDURE — 250N000009 HC RX 250: Performed by: ANESTHESIOLOGY

## 2021-12-18 PROCEDURE — 250N000013 HC RX MED GY IP 250 OP 250 PS 637: Performed by: ANESTHESIOLOGY

## 2021-12-18 PROCEDURE — 370N000017 HC ANESTHESIA TECHNICAL FEE, PER MIN: Performed by: OBSTETRICS & GYNECOLOGY

## 2021-12-18 PROCEDURE — 710N000010 HC RECOVERY PHASE 1, LEVEL 2, PER MIN: Performed by: OBSTETRICS & GYNECOLOGY

## 2021-12-18 PROCEDURE — 999N000141 HC STATISTIC PRE-PROCEDURE NURSING ASSESSMENT: Performed by: OBSTETRICS & GYNECOLOGY

## 2021-12-18 PROCEDURE — 272N000001 HC OR GENERAL SUPPLY STERILE: Performed by: OBSTETRICS & GYNECOLOGY

## 2021-12-18 PROCEDURE — 250N000025 HC SEVOFLURANE, PER MIN: Performed by: OBSTETRICS & GYNECOLOGY

## 2021-12-18 PROCEDURE — 258N000003 HC RX IP 258 OP 636: Performed by: NURSE ANESTHETIST, CERTIFIED REGISTERED

## 2021-12-18 PROCEDURE — 360N000075 HC SURGERY LEVEL 2, PER MIN: Performed by: OBSTETRICS & GYNECOLOGY

## 2021-12-18 PROCEDURE — 0UT70ZZ RESECTION OF BILATERAL FALLOPIAN TUBES, OPEN APPROACH: ICD-10-PCS | Performed by: OBSTETRICS & GYNECOLOGY

## 2021-12-18 RX ORDER — FENTANYL CITRATE 50 UG/ML
INJECTION, SOLUTION INTRAMUSCULAR; INTRAVENOUS PRN
Status: DISCONTINUED | OUTPATIENT
Start: 2021-12-18 | End: 2021-12-18

## 2021-12-18 RX ORDER — CITRIC ACID/SODIUM CITRATE 334-500MG
30 SOLUTION, ORAL ORAL ONCE
Status: DISCONTINUED | OUTPATIENT
Start: 2021-12-18 | End: 2021-12-21 | Stop reason: HOSPADM

## 2021-12-18 RX ORDER — SIMETHICONE 80 MG
80 TABLET,CHEWABLE ORAL EVERY 6 HOURS PRN
Status: DISCONTINUED | OUTPATIENT
Start: 2021-12-18 | End: 2021-12-21 | Stop reason: HOSPADM

## 2021-12-18 RX ORDER — PROPOFOL 10 MG/ML
INJECTION, EMULSION INTRAVENOUS PRN
Status: DISCONTINUED | OUTPATIENT
Start: 2021-12-18 | End: 2021-12-18

## 2021-12-18 RX ORDER — KETOROLAC TROMETHAMINE 30 MG/ML
INJECTION, SOLUTION INTRAMUSCULAR; INTRAVENOUS PRN
Status: DISCONTINUED | OUTPATIENT
Start: 2021-12-18 | End: 2021-12-18

## 2021-12-18 RX ORDER — SODIUM CHLORIDE, SODIUM LACTATE, POTASSIUM CHLORIDE, CALCIUM CHLORIDE 600; 310; 30; 20 MG/100ML; MG/100ML; MG/100ML; MG/100ML
INJECTION, SOLUTION INTRAVENOUS CONTINUOUS
Status: DISCONTINUED | OUTPATIENT
Start: 2021-12-18 | End: 2021-12-21 | Stop reason: HOSPADM

## 2021-12-18 RX ORDER — FENTANYL CITRATE 50 UG/ML
25 INJECTION, SOLUTION INTRAMUSCULAR; INTRAVENOUS EVERY 5 MIN PRN
Status: DISCONTINUED | OUTPATIENT
Start: 2021-12-18 | End: 2021-12-21 | Stop reason: HOSPADM

## 2021-12-18 RX ORDER — OXYCODONE HYDROCHLORIDE 5 MG/1
5 TABLET ORAL EVERY 4 HOURS PRN
Status: CANCELLED | OUTPATIENT
Start: 2021-12-18

## 2021-12-18 RX ORDER — ALBUTEROL SULFATE 0.83 MG/ML
2.5 SOLUTION RESPIRATORY (INHALATION) ONCE
Status: COMPLETED | OUTPATIENT
Start: 2021-12-18 | End: 2021-12-18

## 2021-12-18 RX ORDER — NIFEDIPINE 30 MG/1
30 TABLET, EXTENDED RELEASE ORAL DAILY
Status: DISCONTINUED | OUTPATIENT
Start: 2021-12-18 | End: 2021-12-18

## 2021-12-18 RX ORDER — ONDANSETRON 2 MG/ML
4 INJECTION INTRAMUSCULAR; INTRAVENOUS EVERY 30 MIN PRN
Status: DISCONTINUED | OUTPATIENT
Start: 2021-12-18 | End: 2021-12-21 | Stop reason: HOSPADM

## 2021-12-18 RX ORDER — PROPOFOL 10 MG/ML
INJECTION, EMULSION INTRAVENOUS CONTINUOUS PRN
Status: DISCONTINUED | OUTPATIENT
Start: 2021-12-18 | End: 2021-12-18

## 2021-12-18 RX ORDER — FUROSEMIDE 10 MG/ML
INJECTION INTRAMUSCULAR; INTRAVENOUS PRN
Status: DISCONTINUED | OUTPATIENT
Start: 2021-12-18 | End: 2021-12-18

## 2021-12-18 RX ORDER — LIDOCAINE HYDROCHLORIDE 20 MG/ML
INJECTION, SOLUTION INFILTRATION; PERINEURAL PRN
Status: DISCONTINUED | OUTPATIENT
Start: 2021-12-18 | End: 2021-12-18

## 2021-12-18 RX ORDER — HYDROMORPHONE HYDROCHLORIDE 1 MG/ML
0.2 INJECTION, SOLUTION INTRAMUSCULAR; INTRAVENOUS; SUBCUTANEOUS EVERY 5 MIN PRN
Status: DISCONTINUED | OUTPATIENT
Start: 2021-12-18 | End: 2021-12-21 | Stop reason: HOSPADM

## 2021-12-18 RX ORDER — SODIUM CHLORIDE, SODIUM LACTATE, POTASSIUM CHLORIDE, CALCIUM CHLORIDE 600; 310; 30; 20 MG/100ML; MG/100ML; MG/100ML; MG/100ML
INJECTION, SOLUTION INTRAVENOUS CONTINUOUS PRN
Status: DISCONTINUED | OUTPATIENT
Start: 2021-12-18 | End: 2021-12-18

## 2021-12-18 RX ORDER — OXYCODONE HYDROCHLORIDE 5 MG/1
5 TABLET ORAL EVERY 4 HOURS PRN
Status: DISCONTINUED | OUTPATIENT
Start: 2021-12-18 | End: 2021-12-18 | Stop reason: HOSPADM

## 2021-12-18 RX ORDER — DEXAMETHASONE SODIUM PHOSPHATE 4 MG/ML
INJECTION, SOLUTION INTRA-ARTICULAR; INTRALESIONAL; INTRAMUSCULAR; INTRAVENOUS; SOFT TISSUE PRN
Status: DISCONTINUED | OUTPATIENT
Start: 2021-12-18 | End: 2021-12-18

## 2021-12-18 RX ORDER — ONDANSETRON 2 MG/ML
INJECTION INTRAMUSCULAR; INTRAVENOUS PRN
Status: DISCONTINUED | OUTPATIENT
Start: 2021-12-18 | End: 2021-12-18

## 2021-12-18 RX ORDER — ONDANSETRON 4 MG/1
4 TABLET, ORALLY DISINTEGRATING ORAL EVERY 30 MIN PRN
Status: DISCONTINUED | OUTPATIENT
Start: 2021-12-18 | End: 2021-12-21 | Stop reason: HOSPADM

## 2021-12-18 RX ORDER — LIDOCAINE HYDROCHLORIDE 10 MG/ML
INJECTION, SOLUTION INFILTRATION; PERINEURAL PRN
Status: DISCONTINUED | OUTPATIENT
Start: 2021-12-18 | End: 2021-12-18 | Stop reason: HOSPADM

## 2021-12-18 RX ORDER — NIFEDIPINE 30 MG/1
30 TABLET, EXTENDED RELEASE ORAL DAILY
Status: DISCONTINUED | OUTPATIENT
Start: 2021-12-18 | End: 2021-12-19

## 2021-12-18 RX ADMIN — ACETAMINOPHEN 650 MG: 325 TABLET, FILM COATED ORAL at 15:32

## 2021-12-18 RX ADMIN — MIDAZOLAM 1 MG: 1 INJECTION INTRAMUSCULAR; INTRAVENOUS at 09:27

## 2021-12-18 RX ADMIN — DOCUSATE SODIUM 100 MG: 100 CAPSULE, LIQUID FILLED ORAL at 12:53

## 2021-12-18 RX ADMIN — FENTANYL CITRATE 25 MCG: 50 INJECTION, SOLUTION INTRAMUSCULAR; INTRAVENOUS at 10:58

## 2021-12-18 RX ADMIN — IBUPROFEN 800 MG: 800 TABLET ORAL at 16:39

## 2021-12-18 RX ADMIN — Medication 100 MG: at 09:35

## 2021-12-18 RX ADMIN — HYDROMORPHONE HYDROCHLORIDE 0.2 MG: 1 INJECTION, SOLUTION INTRAMUSCULAR; INTRAVENOUS; SUBCUTANEOUS at 11:35

## 2021-12-18 RX ADMIN — ACETAMINOPHEN 975 MG: 325 TABLET, FILM COATED ORAL at 11:15

## 2021-12-18 RX ADMIN — FENTANYL CITRATE 50 MCG: 50 INJECTION, SOLUTION INTRAMUSCULAR; INTRAVENOUS at 09:40

## 2021-12-18 RX ADMIN — DEXAMETHASONE SODIUM PHOSPHATE 4 MG: 4 INJECTION, SOLUTION INTRAMUSCULAR; INTRAVENOUS at 09:51

## 2021-12-18 RX ADMIN — PROPOFOL 75 MCG/KG/MIN: 10 INJECTION, EMULSION INTRAVENOUS at 09:40

## 2021-12-18 RX ADMIN — LIDOCAINE HYDROCHLORIDE 80 MG: 20 INJECTION, SOLUTION INFILTRATION; PERINEURAL at 09:35

## 2021-12-18 RX ADMIN — ALBUTEROL SULFATE 2.5 MG: 2.5 SOLUTION RESPIRATORY (INHALATION) at 10:50

## 2021-12-18 RX ADMIN — ACETAMINOPHEN 650 MG: 325 TABLET, FILM COATED ORAL at 19:40

## 2021-12-18 RX ADMIN — FUROSEMIDE 10 MG: 10 INJECTION, SOLUTION INTRAVENOUS at 10:45

## 2021-12-18 RX ADMIN — OXYCODONE HYDROCHLORIDE 5 MG: 5 TABLET ORAL at 11:18

## 2021-12-18 RX ADMIN — IBUPROFEN 800 MG: 800 TABLET ORAL at 23:17

## 2021-12-18 RX ADMIN — NIFEDIPINE 30 MG: 30 TABLET, FILM COATED, EXTENDED RELEASE ORAL at 01:57

## 2021-12-18 RX ADMIN — SODIUM CHLORIDE, POTASSIUM CHLORIDE, SODIUM LACTATE AND CALCIUM CHLORIDE: 600; 310; 30; 20 INJECTION, SOLUTION INTRAVENOUS at 09:30

## 2021-12-18 RX ADMIN — PROPOFOL 250 MG: 10 INJECTION, EMULSION INTRAVENOUS at 09:35

## 2021-12-18 RX ADMIN — KETOROLAC TROMETHAMINE 30 MG: 30 INJECTION, SOLUTION INTRAMUSCULAR at 10:30

## 2021-12-18 RX ADMIN — ACETAMINOPHEN 650 MG: 325 TABLET, FILM COATED ORAL at 04:19

## 2021-12-18 RX ADMIN — FENTANYL CITRATE 50 MCG: 50 INJECTION, SOLUTION INTRAMUSCULAR; INTRAVENOUS at 09:51

## 2021-12-18 RX ADMIN — DOCUSATE SODIUM 100 MG: 100 CAPSULE, LIQUID FILLED ORAL at 19:39

## 2021-12-18 RX ADMIN — SODIUM CHLORIDE, POTASSIUM CHLORIDE, SODIUM LACTATE AND CALCIUM CHLORIDE: 600; 310; 30; 20 INJECTION, SOLUTION INTRAVENOUS at 10:30

## 2021-12-18 RX ADMIN — SODIUM CITRATE AND CITRIC ACID MONOHYDRATE 30 ML: 500; 334 SOLUTION ORAL at 09:24

## 2021-12-18 RX ADMIN — ACETAMINOPHEN 650 MG: 325 TABLET, FILM COATED ORAL at 00:15

## 2021-12-18 RX ADMIN — FENTANYL CITRATE 25 MCG: 50 INJECTION, SOLUTION INTRAMUSCULAR; INTRAVENOUS at 11:08

## 2021-12-18 RX ADMIN — SIMETHICONE 80 MG: 80 TABLET, CHEWABLE ORAL at 22:56

## 2021-12-18 RX ADMIN — ONDANSETRON 4 MG: 2 INJECTION INTRAMUSCULAR; INTRAVENOUS at 09:47

## 2021-12-18 RX ADMIN — HYDROMORPHONE HYDROCHLORIDE 0.2 MG: 1 INJECTION, SOLUTION INTRAMUSCULAR; INTRAVENOUS; SUBCUTANEOUS at 11:23

## 2021-12-18 RX ADMIN — SERTRALINE HYDROCHLORIDE 50 MG: 50 TABLET ORAL at 22:05

## 2021-12-18 ASSESSMENT — ACTIVITIES OF DAILY LIVING (ADL)
ADLS_ACUITY_SCORE: 5

## 2021-12-18 ASSESSMENT — MIFFLIN-ST. JEOR: SCORE: 1842.18

## 2021-12-18 NOTE — DISCHARGE SUMMARY
[facility] Discharge Summary    Yue Trean MRN# [id number]   Age: 31 year old YOB: 1990     Date of Admission:  2021  Date of Discharge:  2021  Admitting Physician:  Lorena Vasquez MD  Discharge Physician:  Lurdes Smith MD     Admit Dx:   -  at 37w0d  - S/p exam indicated Olivas cerclage, removed 21  - Desires permanent sterilization  - Anxiety, depression  - gHTN, now with preE w/SF    Discharge Dx:  - Same as above, now  s/p     Procedures:  - Spontaneous vaginal delivery  - Epidural analgesia    Admit HPI/Labor Course:   She is feeling well. She has had an intermittent headache over the past few weeks that have improved with caffeine and rest. Denies vision changes. She is feeling quite swollen in her legs, some in her hands and face too. She denies current headache, vision changes, chest pain, shortness of breath, fever, chills, nausea, vomiting or other systemic complaints. She denies vaginal bleeding or loss of fluid and is feeling normal fetal movement.     Yue Teran is a 31 year old now  at 37w1d who presented had a clinic appt with severe range BP and was diagnosed with preeclampsia with severe features after evaluation in triage.She was admitted for IOL for preeclampsia with severe features. She was induced with pitocin and augmented with AROM. She received an epidural for pain control, progressed to complete dilation, pushed and with good maternal effort delivered a female infant on 2021 at 0923 from the OA position. Shoulders delivered easily. Infant with spontaneous cry. Placed on mother's chest. Cord clamping delayed 1min. Apgars 8/9, weight 3100g. IV pitocin started. Placenta delivered with fundal massage, gentle cord traction and suprapubic countertraction; noted to be intact with 3 vessel cord. 1st degree perineal laceration and right labial laceration, repaired with 3-0 Vicryl. Fundus firm and  lochia minimal at the end of the case. .    Please see her Admission H&P and Delivery Summary for further details.    Postpartum Course:  Her postpartum course was complicated by pre-eclampsia with severe features. She was continued on IV Magnesium for 24 hours postpartum, she received IV antihypertensives immediately postpartum. She was started on Nifedipine and hydrochlorothiazide postpartum and increased to a regimen of Nifedipine XL 90mg, labetalol 200 mg tid, and hydrochlorothiazide 25mg for 7 days. Blood pressures remained normal to mild range for 24 hours following this regimen. Repeat HELLP labs on PPD#1 and PPD#3 were normal.    On PPD#4, she was meeting all of her postpartum goals and deemed stable for discharge. She was voiding without difficulty, tolerating a regular diet without nausea and vomiting, her pain was well controlled on oral pain medicines and her lochia was appropriate. Her hemoglobin prior to delivery was 11.7 and after delivery was 10.2. Her Rh status was positive, and Rhogam was not indicated. She was discharged home with an Rx for a blood pressure cuff and symptoms/BP levels to watch out for. She was advised to follow up in clinic in 1 week for a blood pressure check.     Discharge Medications:     Review of your medicines        START taking        Dose / Directions   acetaminophen 325 MG tablet  Commonly known as: TYLENOL  Used for:  (normal spontaneous vaginal delivery)      Dose: 650 mg  Take 2 tablets (650 mg) by mouth every 6 hours as needed for mild pain Start after Delivery.  Quantity: 100 tablet  Refills: 0     Blood Pressure Cuff Misc      Dose: 1 Units  1 Units daily  Quantity: 1 each  Refills: 0     ferrous sulfate 325 (65 Fe) MG EC tablet  Commonly known as: FE TABS  Used for:  (normal spontaneous vaginal delivery)      Dose: 325 mg  Take 1 tablet (325 mg) by mouth daily  Quantity: 90 tablet  Refills: 0     hydrochlorothiazide 25 MG tablet  Commonly known as:  HYDRODIURIL  Used for: Pre-eclampsia in postpartum period      Dose: 25 mg  Start taking on: 2021  Take 1 tablet (25 mg) by mouth daily  Quantity: 4 tablet  Refills: 0     ibuprofen 600 MG tablet  Commonly known as: ADVIL/MOTRIN  Used for:  (normal spontaneous vaginal delivery)      Dose: 600 mg  Take 1 tablet (600 mg) by mouth every 6 hours as needed for moderate pain Start after delivery  Quantity: 60 tablet  Refills: 0     labetalol 200 MG tablet  Commonly known as: NORMODYNE  Used for: Pre-eclampsia in postpartum period      Dose: 200 mg  Take 1 tablet (200 mg) by mouth every 8 hours for 14 days  Quantity: 42 tablet  Refills: 0     NIFEdipine ER 90 MG 24 hr tablet  Commonly known as: ADALAT CC  Used for: Pre-eclampsia in postpartum period      Dose: 90 mg  Start taking on: 2021  Take 1 tablet (90 mg) by mouth daily for 13 days  Quantity: 13 tablet  Refills: 0     senna-docusate 8.6-50 MG tablet  Commonly known as: SENOKOT-S/PERICOLACE  Used for:  (normal spontaneous vaginal delivery)      Dose: 1 tablet  Take 1 tablet by mouth daily Start after delivery.  Quantity: 100 tablet  Refills: 0            CONTINUE these medicines which have NOT CHANGED        Dose / Directions   docusate sodium 100 MG capsule  Commonly known as: COLACE      Dose: 100 mg  Take 100 mg by mouth daily  Refills: 0     prenatal multivitamin w/iron 27-0.8 MG tablet      Dose: 1 tablet  Take 1 tablet by mouth daily  Refills: 0     sertraline 50 MG tablet  Commonly known as: ZOLOFT      Dose: 50 mg  Take 1 tablet (50 mg) by mouth daily  Quantity: 90 tablet  Refills: 3               Where to get your medicines        These medications were sent to [facility] [place], [place] - [address]  [address] [address]      Phone: [phone]   acetaminophen 325 MG tablet  Blood Pressure Cuff Misc  ferrous sulfate 325 (65 Fe) MG EC tablet  hydrochlorothiazide 25 MG  tablet  ibuprofen 600 MG tablet  labetalol 200 MG tablet  NIFEdipine ER 90 MG 24 hr tablet  senna-docusate 8.6-50 MG tablet         Discharge/Disposition:  Yue Teran was discharged to home in stable condition with the following instructions/medications:  1) Call for temperature > 100.4, bright red vaginal bleeding >1 pad an hour x 2 hours, foul smelling vaginal discharge, pain not controlled by usual oral pain meds, persistent nausea and vomiting not controlled on medications  2) She received a tubal ligation for contraception.  3) For feeding she decided to breast feed.  4) She was instructed to follow-up with her primary OB in 6 weeks for a routine postpartum visit and in 1 week for a blood pressure check.      Janessa Trujillo MD  OB/GYN Resident PGY-2  4:05 PM December 21, 2021    I saw and evaluated the patient. I agree with the   findings and the plan of care as documented in the resident's note.  MD Otoniel

## 2021-12-18 NOTE — OR NURSING
Dr. Lee was asked if oxycodone and dilaudid were safe to give patient while breastfeeding.  Dr. Lee reported no contraindications and stated the meds could be given if needed.

## 2021-12-18 NOTE — PROGRESS NOTES
"Postpartum Progress Note  Yue Teran  [id number]    Subjective:   Patient is feeling fine this AM. She is quite tired after not getting sleep for several days. She is tearful intermittently. Ready for her postpartum tubal ligation, no additional questions. She is currently NPO.  Ambulating without difficulty, no lightheadedness. Pain is adequately controlled.  Voiding without difficulty.    Objective:  BP (!) 157/104   Pulse 69   Temp 97.6  F (36.4  C) (Oral)   Resp 16   Ht 1.702 m (5' 7\")   Wt 108.5 kg (239 lb 1.6 oz)   LMP 2021   SpO2 100%   Breastfeeding Unknown   BMI 37.45 kg/m      I/O last 3 completed shifts:  In: 4441.7 [P.O.:3235; I.V.:1206.7]  Out: 5287 [Urine:4850; Blood:437]    General: appropriately interactive, NAD, comfortable  Heart: regular rate, extremities warm and well-perfused  Lungs: breathing comfortably, symmetric chest rise  Abdomen: Soft, non-tender, non-distended; fundus firm and below umbilicus  Extremities: 1+ edema in BLE  Neuro: hyperreflexia with no clonus    Labs:  Hemoglobin   Date Value Ref Range Status   2021 10.2 (L) 11.7 - 15.7 g/dL Final     Hemoglobin   Date Value Ref Range Status   2021 10.2 (L) 11.7 - 15.7 g/dL Final   2021 11.7 11.7 - 15.7 g/dL Final     Assessment/Plan: 31 year old  who is PPD#1 s/p .  Currently stable and doing well. Pregnancy was complicated by preeclampsia with severe features. Doing well. No s/s of worsening preeclampsia or magnesium toxicity. Planning for PPTL this AM.     #Preeclampsia with SF  - BPs: Persistently high mild range  - Medications: Nifedipine 30 mg XL started at 0200 today   - Symptoms: None  - Labs: AM HELLP labs ordered   - Weights: daily   - UOP: >100 ml/hr, adequate  - Continue IV magnesium sulfate 2 g/h   - Plan to discontinue IV Magnesium at 0730 prior to scheduled PPTL   - Continue routine postpartum care    #Routine postpartum cares  - Continue routine post-partum cares.     - Heme: " Hgb 11.7 >  > 10.2  - [facility]:  [facility]  - Contraception: planning for PPTL today  - Rh positive  - Rubella immune    Dispo:DC to home likely 1-2 days have tubal liagation, postpartum day 2-3    Melisa Morejon, MPH, MS3  December 18, 2021, 8:03 AM    [facility] staff:  Appreciate note by Dr. Denis Morejon.  I have seen and examined the patient with the resident. I have reviewed, edited, and agree with the note.        Jumana Landeros MD, FACOG

## 2021-12-18 NOTE — ANESTHESIA CARE TRANSFER NOTE
Patient: Yue Teran    Procedure: Procedure(s):  Bilateral Salpingectomy       Diagnosis: Sterilization [Z30.2]  Diagnosis Additional Information: No value filed.    Anesthesia Type:   General     Note:    Oropharynx: oropharynx clear of all foreign objects and spontaneously breathing  Level of Consciousness: awake  Oxygen Supplementation: face mask  Level of Supplemental Oxygen (L/min / FiO2): 8 L  Independent Airway: airway patency satisfactory and stable  Dentition: dentition unchanged  Vital Signs Stable: post-procedure vital signs reviewed and stable  Report to RN Given: handoff report given  Patient transferred to: PACU    Handoff Report: Identifed the Patient, Identified the Reponsible Provider, Reviewed the pertinent medical history, Discussed the surgical course, Reviewed Intra-OP anesthesia mangement and issues during anesthesia, Set expectations for post-procedure period and Allowed opportunity for questions and acknowledgement of understanding      Vitals:  Vitals Value Taken Time   /104 12/18/21 1050   Temp     Pulse 96 12/18/21 1054   Resp 21 12/18/21 1054   SpO2 94 % 12/18/21 1054   Vitals shown include unvalidated device data.    Electronically Signed By: Ashley M. Mulvihill, APRN CRNA  December 18, 2021  10:55 AM

## 2021-12-18 NOTE — PLAN OF CARE
BP ranging 140-150s, started on daily Procardia 30mg. Mag infusing at 2gm/hr, discontinued at 0730. Pt had mild headache, provider aware. Denies vision changes and dizziness. +2 reflexes, clonus absent. Fundus U/1. Voiding adequately. Ambulating w/o issues. Breastfeeding w/ minimal assist and hand expressing independently. NPO since 0145. Report given to Pre-op for schedule tubal procedure. Patient and  bonding well with infant.

## 2021-12-18 NOTE — PROVIDER NOTIFICATION
12/18/21 1258   Provider Notification   Provider Name/Title Jean-Pierre FELDER   Method of Notification Electronic Page   Notification Reason Status Update  (/99, recheck 153/98)   30mg Procardia given @ 0200. There is a also a 0800 dose (held d/t PPTL), would you like me to give? Thanks!

## 2021-12-18 NOTE — OR NURSING
PACU to Inpatient Nursing Handoff    Patient Yue Teran is a 31 year old female who speaks English.   Procedure Procedure(s):  Bilateral Salpingectomy   Surgeon(s) Primary: Jumana Landeros MD  Resident - Assisting: Sangeeta Morejon MD; Esvin Ibarra MD     Allergies   Allergen Reactions     Egg [Egg Shells] Unknown     Amoxicillin Rash     Cefzil [Cefprozil] Rash       Isolation  [unfilled]     Past Medical History   has a past medical history of Anxiety and Depressive disorder.    Anesthesia Choice   Dermatome Level Dermatomes Left: T10  Dermatomes Right: T10   Preop Meds bicitra  - time given: 0924   Nerve block Not applicable   Intraop Meds dexamethasone (Decadron)  fentanyl (Sublimaze): 100 mcg total  ketorolac (Toradol): last given at 1030  ondansetron (Zofran): last given at 0951  lasix 10mg 1045   Local Meds Yes   Antibiotics Not applicable     Pain Patient Currently in Pain: yes   PACU meds  acetaminophen (Tylenol): 975 mg (total dose) last given at 1115   fentanyl (Sublimaze): 50 mcg (total dose) last given at 1108   hydromorphone (Dilaudid): 0.4 mg (total dose) last given at 1135   oxycodone (Roxicodone): 5 mg (total dose) last given at 1118    PCA / epidural No   Capnography     Telemetry ECG Rhythm: Normal sinus rhythm   Inpatient Telemetry Monitor Ordered? No        Labs Glucose Lab Results   Component Value Date    GLC 76 12/09/2021       Hgb Lab Results   Component Value Date    HGB 10.2 12/18/2021       INR No results found for: INR   PACU Imaging Not applicable     Wound/Incision Incision/Surgical Site 12/18/21 Midline Abdomen (Active)   Incision Assessment UTV 12/18/21 1050   Dorothy-Incision Assessment UTV 12/18/21 1050   Closure Sutures 12/18/21 1018   Number of days: 0      CMS        Equipment ice pack   Other LDA       IV Access Peripheral IV 12/16/21 Right;Posterior Lower forearm (Active)   Site Assessment WDL 12/18/21 1050   Line Status Infusing 12/18/21 1050    Phlebitis Scale 0-->no symptoms 12/18/21 1050   Infiltration Scale 0 12/18/21 1050   Infiltration Site Treatment Method  None 12/17/21 0750   Number of days: 2      Blood Products Not applicable EBL 2 mL   Intake/Output Date 12/18/21 0700 - 12/19/21 0659   Shift 5557-6094 8770-3103 1462-2827 24 Hour Total   INTAKE   I.V. 2563   2563   Shift Total(mL/kg) 2563(23.63)   2563(23.63)   OUTPUT   Urine 2400   2400   Blood 2   2   Shift Total(mL/kg) 2402(22.15)   2402(22.15)   Weight (kg) 108.45 108.45 108.45 108.45      Drains / Ray     Time of void PreOp Void Prior to Procedure: 0803 (12/18/21 0800)    PostOp Voided (mL): 900 mL (12/18/21 0800)  Straight cath: 400 mL (12/17/21 0445)    Diapered? No   Bladder Scan      mL (12/18/21 0100)  crackers and water     Vitals    B/P: (!) 142/96  T: 97.6  F (36.4  C)    Temp src: Axillary  P:  Pulse: 99 (12/18/21 1130)          R: 15  O2:  SpO2: 98 %    O2 Device: None (Room air) (12/18/21 1130)    Oxygen Delivery: 5 LPM (12/18/21 1100)         Family/support present significant other   Patient belongings     Patient transported on cart   DC meds/scripts (obs/outpt) Not applicable   Inpatient Pain Meds Released? Yes       Special needs/considerations None   Tasks needing completion None       Shaunna Escobedo, RN  [facility] [id number]

## 2021-12-18 NOTE — PLAN OF CARE
Pt arrived from PACU @ 1230. Initial BP elevated (G1 notified), but rechecks WNL. Pt denies HA, vision changes & epigastric pain. Pt c/o minimal pain, taking ibuprofen & tylenol pain. Declines need to any additional pain medication this afternoon. Pt up in room independently, ambulating to bathroom. Tolerating PO fluids & regular diet. Breastfeeding baby was staff assist. Bonding well with baby.  here & very supportive.

## 2021-12-18 NOTE — OP NOTE
[facility]  Obstetrics and Gynecology Operative Note    Surgery Date: 21    Patient Name: Yue Teran  MRN: [id number]    Surgeon: Jumana Landeros MD  Assistants:  - Ezequiel Ibarra MD MPH, PGY-3.  - Melisa Kat MD, PGY-1    Preoperative diagnosis: , desire for permanent sterilization   Postoperative diagnosis: Same, s/p sterilization by procedure below      Procedure:    - Post partum laparoscopic bilateral salpingectomy    Anesthesia:  GETA, local  EBL:    2 mL  IVF:    1000 mL crystalloid  UOP:    750 mL clear urine  Drains:    None  Specimens:   Left and right fallopian tube  Complications: None     Findings:  Normal appearing uterine fundus. Normal appearing fallopian tubes, fimbria, and ovaries bilaterally. Several paratubal cysts on right tube at fimbria.    Indications:   Yue Teran is a 31 year old  PPD#1 s/p  who desires permanent sterilization. The patient signed appropriate federal papers on 10/28/21. The risks of the procedure were discussed including infection, bleeding, and injury to surround organs, as well as risk of regret, and 5 in 1000 risk of failure of the procedure resulting in pregnancy. If pregnancy does occur, there is an increased risk of ectopic pregnancy. The benefits and alternatives for contraception including condoms, OCPs, NuvaRing, abstinence, interval tubal ligation, IUD, and Nexplanon were discussed and the patient continued to express desire for a postpartum tubal ligation and understands that this procedure is permanent and not reversible.    Procedure details:  The patient was taken to the operating room and placed in the dorsal supine position prior to administration of general anesthesia. She was prepped and draped in the sterile fashion. A surgical time out was performed.     4 mL of 1% ldocaine was injection into the skin overlying the area of interest. An approximately 6 cm transverse, infraumbilical skin  incision was then made with the scalpel. The incision was carried down through the underlying fascia until the peritoneum was identified and entered. The peritoneum was noted to be free of any adhesions and the incision was then extended with cautery.    The patient's left fallopian tube was identified and followed to fimbria. Using the handheld ligasure the mesosalopinx beneath the tube was sequentially cauterized and cut. This was repeated until reaching approximately 1cm from the cornua. The tube was then cut, cauterized, and transected at that point. The mesosalpinx was inspected and noted to be hemostatic. The same process was repeated on the right side.    The fascia was then closed in a single layer in simple running fashion using 0-Vicryl. The skin was then closed with 4-0  Monocryl and covered with a sterile bandage.    Instrument, sponge, and sharps counts were correct x2. Dr. Landeros was scrubbed and present for the entire procedure. The patient tolerated the procedure well.     Ezequiel Ibarra MD MPH  OB/Gyn PGY-3  12/18/21 10:14 AM    Staff:  I was scrubbed and present for entire case and agree w/ above note.    Jumana Landeros MD

## 2021-12-18 NOTE — ANESTHESIA PREPROCEDURE EVALUATION
Anesthesia Pre-Procedure Evaluation    Patient: Yue Teran   MRN: [id number] : 1990        Preoperative Diagnosis: Sterilization [Z30.2]    Procedure : Procedure(s):  LIGATION, FALLOPIAN TUBE, POSTPARTUM          Past Medical History:   Diagnosis Date     Anxiety      Depressive disorder       Past Surgical History:   Procedure Laterality Date     APPENDECTOMY  2011     BONE CYST EXCISION  2012     CERCLAGE CERVICAL N/A 2021    Procedure: CERCLAGE, CERVIX, VAGINAL APPROACH;  Surgeon: Miladys Contreras MD;  Location: [facility] L[facility]     CERCLAGE CERVICAL  2021     REMOVE CERCLAGE N/A 2021    Procedure: REMOVAL, CERCLAGE SUTURE;  Surgeon: Lurdes Thao MD;  Location: [facility]     TONSILLECTOMY  2009     TONSILLECTOMY       WISDOM TOOTH EXTRACTION        Allergies   Allergen Reactions     Egg [Egg Shells] Unknown     Amoxicillin Rash     Cefzil [Cefprozil] Rash      Social History     Tobacco Use     Smoking status: Never Smoker     Smokeless tobacco: Never Used   Substance Use Topics     Alcohol use: Never      Wt Readings from Last 1 Encounters:   21 112.9 kg (249 lb)        Anesthesia Evaluation   Pt has had prior anesthetic. Type: Regional.    History of anesthetic complications  - PONV.      ROS/MED HX  ENT/Pulmonary:     (+) JAE risk factors, obese,     Neurologic:  - neg neurologic ROS     Cardiovascular: Comment: Pre-eclampsia with the last preganacy.    (+) hypertension-----    METS/Exercise Tolerance: 4 - Raking leaves, gardening    Hematologic:     (+) anemia,     Musculoskeletal:  - neg musculoskeletal ROS     GI/Hepatic:     (+) GERD, Asymptomatic on medication,     Renal/Genitourinary:  - neg Renal ROS     Endo:     (+) Obesity,     Psychiatric/Substance Use:  - neg psychiatric ROS     Infectious Disease:  - neg infectious disease ROS     Malignancy:  - neg malignancy ROS     Other:            Physical Exam    Airway        Mallampati: III   TM distance: > 3 FB   Neck ROM:  full   Mouth opening: > 3 cm    Respiratory Devices and Support         Dental  no notable dental history         Cardiovascular   cardiovascular exam normal          Pulmonary   pulmonary exam normal                OUTSIDE LABS:  CBC:   Lab Results   Component Value Date    WBC 11.6 (H) 12/18/2021    WBC 10.1 12/16/2021    HGB 10.2 (L) 12/18/2021    HGB 11.7 12/17/2021    HCT 30.3 (L) 12/18/2021    HCT 33.2 (L) 12/16/2021     12/18/2021     12/17/2021     BMP:   Lab Results   Component Value Date     12/09/2021     12/04/2021    POTASSIUM 3.8 12/09/2021    POTASSIUM 3.4 12/04/2021    CHLORIDE 109 12/09/2021    CHLORIDE 106 12/04/2021    CO2 25 12/09/2021    CO2 22 12/04/2021    BUN 11 12/09/2021    BUN 11 12/04/2021    CR 0.60 12/17/2021    CR 0.60 12/16/2021    GLC 76 12/09/2021    GLC 94 12/04/2021     COAGS: No results found for: PTT, INR, FIBR  POC:   Lab Results   Component Value Date    HCGS Negative 09/30/2019     HEPATIC:   Lab Results   Component Value Date    ALBUMIN 2.4 (L) 12/04/2021    PROTTOTAL 6.0 (L) 12/04/2021    ALT 28 12/17/2021    AST 27 12/17/2021    ALKPHOS 166 (H) 12/04/2021    BILITOTAL 0.2 12/04/2021     OTHER:   Lab Results   Component Value Date    CAROLINE 8.6 12/09/2021    PHOS 3.9 12/09/2021    MAG 1.5 (L) 12/09/2021    TSH 1.80 06/25/2019       Anesthesia Plan    ASA Status:  3      Anesthesia Type: General.     - Airway: ETT   Induction: RSI.      Techniques and Equipment:     - Airway: Video-Laryngoscope         Consents    Anesthesia Plan(s) and associated risks, benefits, and realistic alternatives discussed. Questions answered and patient/representative(s) expressed understanding.    - Discussed:     - Discussed with:  Patient         Postoperative Care    Pain management: Multi-modal analgesia.   PONV prophylaxis: Ondansetron (or other 5HT-3), Dexamethasone or Solumedrol     Comments:    Other Comments: The patient does not want to have a spinal due to  recent labor epidural and 2 spinal anesthesia during this recent pregancy which were all painful and traumatic emotionally for her. I explained the risks and benefits of GA in the immediate postpartum period, including risk of aspiration and the patient prefers GA instead of spinal anesthesia.             Luzma Lee MD

## 2021-12-18 NOTE — ANESTHESIA PROCEDURE NOTES
Airway       Patient location during procedure: OR       Procedure Start/Stop Times: 12/18/2021 9:37 AM  Staff -        CRNA: Mulvihill, Ashley M, APRN CRNA       Performed By: CRNA  Consent for Airway        Urgency: elective  Indications and Patient Condition       Indications for airway management: cynthia-procedural       Induction type:RSI (Modified )       Mask difficulty assessment: 1 - vent by mask    Final Airway Details       Final airway type: endotracheal airway       Successful airway: ETT - single and Oral  Endotracheal Airway Details        ETT size (mm): 6.5       Cuffed: yes       Successful intubation technique: video laryngoscopy       VL Blade Size: MAC 3       Grade View of Cords: 1       Adjucts: stylet       Position: Right       Measured from: gums/teeth       Secured at (cm): 22       Bite block used: None    Post intubation assessment        Placement verified by: capnometry, equal breath sounds and chest rise        Number of attempts at approach: 1       Number of other approaches attempted: 0       Secured with: silk tape       Ease of procedure: easy       Dentition: Intact and Unchanged    Additional Comments       Cricoid pressure applied throughout induction and intubation

## 2021-12-18 NOTE — ANESTHESIA POSTPROCEDURE EVALUATION
Patient: Yue Teran    Procedure: Procedure(s):  Bilateral Salpingectomy       Diagnosis:Sterilization [Z30.2]  Diagnosis Additional Information: No value filed.    Anesthesia Type:  General    Note:  Disposition: Inpatient   Postop Pain Control: Uneventful            Sign Out: Well controlled pain   PONV: No   Neuro/Psych: Uneventful            Sign Out: Acceptable/Baseline neuro status   Airway/Respiratory: Uneventful            Sign Out: Acceptable/Baseline resp. status   CV/Hemodynamics: Uneventful            Sign Out: Acceptable CV status; No obvious hypovolemia; No obvious fluid overload   Other NRE: NONE   DID A NON-ROUTINE EVENT OCCUR? No           Last vitals:  Vitals Value Taken Time   /90 12/18/21 1215   Temp 36.8  C (98.3  F) 12/18/21 1200   Pulse 87 12/18/21 1215   Resp 14 12/18/21 1200   SpO2 97 % 12/18/21 1217   Vitals shown include unvalidated device data.    Electronically Signed By: Luzma Lee MD  December 18, 2021  12:18 PM

## 2021-12-18 NOTE — PROVIDER NOTIFICATION
12/17/21 5921   Provider Notification   Provider Name/Title Dr Stringer, G3   Method of Notification Electronic Page   Request Evaluate-Remote   Notification Reason Other  (Patient planning to have tubular procedure tmrw, still on Ma)

## 2021-12-18 NOTE — PROVIDER NOTIFICATION
12/18/21 0140   Provider Notification   Provider Name/Title Dr Stringer G3   Method of Notification Electronic Page   Request Evaluate-Remote   Notification Reason Vital Signs Change  (FYI BP has been high. First 150/102, recheck an hour later 1)     **Addendum: New order for daily Procardia 30mg

## 2021-12-18 NOTE — PROGRESS NOTES
"Yue ROCKWELL West  : 1990  MRN: [id number]    Postpartum Magnesium Check    S: Had some RUQ \"cramping\" similar to a stitch in her side that has now resolved. No headache, vision changes, chest pain or SOB.     O: Patient Vitals for the past 24 hrs:   BP Temp Temp src Pulse Resp SpO2   21 0135 (!) 159/100 -- -- 69 16 100 %   21 0115 (!) 157/101 -- -- -- -- --   21 0024 (!) 150/102 97.6  F (36.4  C) Oral 74 16 100 %   21 2205 (!) 146/94 -- -- 72 16 100 %   21 2045 (!) 148/94 97.8  F (36.6  C) Oral 79 16 99 %   21 1651 (!) 144/93 97.8  F (36.6  C) Oral 79 16 100 %   21 1400 (!) 140/81 -- -- 86 -- --   21 1350 (!) 148/88 -- -- 85 -- --   21 1340 (!) 147/87 -- -- 84 -- --   21 1330 (!) 146/89 -- -- 84 -- --   21 1320 (!) 146/97 -- -- 85 -- --   21 1310 (!) 141/88 -- -- 75 -- --   21 1258 (!) 150/89 -- -- 81 -- --   21 1241 (!) 161/94 -- -- 78 -- --   21 1212 (!) 165/106 97.6  F (36.4  C) Oral 74 16 --   21 1154 (!) 169/101 -- -- -- -- --   21 1125 (!) 159/92 -- -- -- -- 100 %   21 1110 136/86 -- -- -- -- 100 %   21 1055 126/85 -- -- -- -- 98 %   21 1025 (!) 152/85 -- -- -- -- 98 %   21 1010 (!) 154/82 -- -- -- -- 99 %   21 0955 (!) 148/84 -- -- -- -- 99 %   21 0939 (!) 144/81 97.8  F (36.6  C) Oral -- 16 --   21 0842 (!) 158/93 -- -- -- -- 96 %   21 0750 (!) 144/88 97.4  F (36.3  C) Oral -- 16 97 %   21 0646 136/84 97.5  F (36.4  C) Oral -- 16 97 %   21 0548 123/74 97.7  F (36.5  C) Oral -- 16 96 %   21 0436 -- 97.4  F (36.3  C) Oral -- -- --   21 0336 -- 97.5  F (36.4  C) Oral -- -- --       Gen: sleeping, easy to awake, NAD  Resp: CTAB  CV: RRR with no murmurs  Abdomen: soft, nontender  Ext: warm, well perfused, no edema  Neuro: 2+ patellar and biceps reflexes.       I/O  I/O last 3 completed shifts:  In: 4945.8 [P.O.:3120; I.V.:1825.8]  Out: " 4037 [Urine:3600; Blood:437]  I/O this shift:  In: 240 [P.O.:240]  Out: 800 [Urine:800]    Labs:  Recent Labs   Lab 21  1046 21  1700   HGB 11.7 11.6*    165   CR 0.60 0.60   AST 27 29   ALT 28 29     No lab results found in last 7 days.    A/P: Yue Teran is a  female PPD#1 s/p .  Pregnancy was complicated by preeclampsia with severe features. Doing well. No s/s of worsening preeclampsia or magnesium toxicity.     #Preeclampsia with SF  - BPs: Persistently high mild range  - Medications: Nifedipine 30 mg XL started at 0200 today   - Symptoms: None  - Labs: AM HELLP labs ordered   - Weights: daily   - UOP: >100 ml/hr, adequate  - Continue IV magnesium sulfate 2 g/h   - Plan to discontinue IV Magnesium at 0730 prior to scheduled PPTL   - Continue routine postpartum care    Casandra Maddox MD  Obstetrics & Gynecology, PGY-3  2021 3:29 AM

## 2021-12-18 NOTE — PROVIDER NOTIFICATION
12/18/21 0428   Provider Notification   Provider Name/Title Dr Stringer   Method of Notification Electronic Page   Request Evaluate-Remote   Notification Reason Vital Signs Change     FYI /108, recheck 157/104. Pt reports mild headache, denies vision changes and dizziness.     No new orders from resident. Continue to monitor BP.

## 2021-12-19 LAB
ALT SERPL W P-5'-P-CCNC: 32 U/L (ref 0–50)
AST SERPL W P-5'-P-CCNC: 38 U/L (ref 0–45)
CREAT SERPL-MCNC: 0.74 MG/DL (ref 0.52–1.04)
GFR SERPL CREATININE-BSD FRML MDRD: >90 ML/MIN/1.73M2
PLATELET # BLD AUTO: 193 10E3/UL (ref 150–450)

## 2021-12-19 PROCEDURE — 85049 AUTOMATED PLATELET COUNT: CPT | Performed by: STUDENT IN AN ORGANIZED HEALTH CARE EDUCATION/TRAINING PROGRAM

## 2021-12-19 PROCEDURE — 250N000013 HC RX MED GY IP 250 OP 250 PS 637

## 2021-12-19 PROCEDURE — 250N000013 HC RX MED GY IP 250 OP 250 PS 637: Performed by: STUDENT IN AN ORGANIZED HEALTH CARE EDUCATION/TRAINING PROGRAM

## 2021-12-19 PROCEDURE — 120N000002 HC R&B MED SURG/OB UMMC

## 2021-12-19 PROCEDURE — 36415 COLL VENOUS BLD VENIPUNCTURE: CPT | Performed by: STUDENT IN AN ORGANIZED HEALTH CARE EDUCATION/TRAINING PROGRAM

## 2021-12-19 PROCEDURE — 84460 ALANINE AMINO (ALT) (SGPT): CPT | Performed by: STUDENT IN AN ORGANIZED HEALTH CARE EDUCATION/TRAINING PROGRAM

## 2021-12-19 PROCEDURE — 84450 TRANSFERASE (AST) (SGOT): CPT | Performed by: STUDENT IN AN ORGANIZED HEALTH CARE EDUCATION/TRAINING PROGRAM

## 2021-12-19 PROCEDURE — 82565 ASSAY OF CREATININE: CPT | Performed by: STUDENT IN AN ORGANIZED HEALTH CARE EDUCATION/TRAINING PROGRAM

## 2021-12-19 RX ORDER — IBUPROFEN 600 MG/1
600 TABLET, FILM COATED ORAL EVERY 6 HOURS PRN
Qty: 60 TABLET | Refills: 0 | Status: SHIPPED | OUTPATIENT
Start: 2021-12-19 | End: 2022-02-03

## 2021-12-19 RX ORDER — NIFEDIPINE 30 MG/1
60 TABLET, EXTENDED RELEASE ORAL DAILY
Status: DISCONTINUED | OUTPATIENT
Start: 2021-12-20 | End: 2021-12-20

## 2021-12-19 RX ORDER — AMOXICILLIN 250 MG
1 CAPSULE ORAL DAILY
Qty: 100 TABLET | Refills: 0 | Status: SHIPPED | OUTPATIENT
Start: 2021-12-19 | End: 2022-01-07

## 2021-12-19 RX ORDER — HYDROCHLOROTHIAZIDE 25 MG/1
25 TABLET ORAL DAILY
Status: DISCONTINUED | OUTPATIENT
Start: 2021-12-19 | End: 2021-12-21 | Stop reason: HOSPADM

## 2021-12-19 RX ORDER — NIFEDIPINE 30 MG/1
30 TABLET, EXTENDED RELEASE ORAL ONCE
Status: COMPLETED | OUTPATIENT
Start: 2021-12-19 | End: 2021-12-19

## 2021-12-19 RX ORDER — ACETAMINOPHEN 325 MG/1
650 TABLET ORAL EVERY 6 HOURS PRN
Qty: 100 TABLET | Refills: 0 | Status: SHIPPED | OUTPATIENT
Start: 2021-12-19

## 2021-12-19 RX ORDER — FERROUS SULFATE 325(65) MG
325 TABLET, DELAYED RELEASE (ENTERIC COATED) ORAL DAILY
Qty: 90 TABLET | Refills: 0 | Status: SHIPPED | OUTPATIENT
Start: 2021-12-19 | End: 2022-02-03

## 2021-12-19 RX ADMIN — NIFEDIPINE 30 MG: 30 TABLET, FILM COATED, EXTENDED RELEASE ORAL at 18:49

## 2021-12-19 RX ADMIN — IBUPROFEN 800 MG: 800 TABLET ORAL at 11:22

## 2021-12-19 RX ADMIN — DOCUSATE SODIUM 100 MG: 100 CAPSULE, LIQUID FILLED ORAL at 08:34

## 2021-12-19 RX ADMIN — IBUPROFEN 800 MG: 800 TABLET ORAL at 18:27

## 2021-12-19 RX ADMIN — IBUPROFEN 800 MG: 800 TABLET ORAL at 05:03

## 2021-12-19 RX ADMIN — ACETAMINOPHEN 650 MG: 325 TABLET, FILM COATED ORAL at 01:46

## 2021-12-19 RX ADMIN — SIMETHICONE 80 MG: 80 TABLET, CHEWABLE ORAL at 18:49

## 2021-12-19 RX ADMIN — SIMETHICONE 80 MG: 80 TABLET, CHEWABLE ORAL at 11:21

## 2021-12-19 RX ADMIN — ACETAMINOPHEN 650 MG: 325 TABLET, FILM COATED ORAL at 18:27

## 2021-12-19 RX ADMIN — NIFEDIPINE 10 MG: 10 CAPSULE ORAL at 17:57

## 2021-12-19 RX ADMIN — HYDROCHLOROTHIAZIDE 25 MG: 25 TABLET ORAL at 08:35

## 2021-12-19 RX ADMIN — ACETAMINOPHEN 650 MG: 325 TABLET, FILM COATED ORAL at 11:22

## 2021-12-19 RX ADMIN — ACETAMINOPHEN 650 MG: 325 TABLET, FILM COATED ORAL at 05:03

## 2021-12-19 RX ADMIN — NIFEDIPINE 30 MG: 30 TABLET, FILM COATED, EXTENDED RELEASE ORAL at 08:35

## 2021-12-19 ASSESSMENT — MIFFLIN-ST. JEOR: SCORE: 1837.47

## 2021-12-19 NOTE — PLAN OF CARE
Data: Vital signs except BP slightly elevated and postpartum checks WDL  Reflex normal with no clonus. Patient eating and drinking normally. Patient able to empty bladder independently and is up ambulating.  Patient performing self cares and is able to care for infant. Breastfeeding on demand. Started supplementing baby with formula due to high intermediate twice.   Action: Patient medicated during the shift for pain with Tylenol and Ibuprofen with relief after 1 hour. Patient education done see education record.  Response: Positive attachment behaviors observed with infant. Support persons  present.    Plan: Continue with the plan of care

## 2021-12-19 NOTE — PROVIDER NOTIFICATION
12/19/21 0838   Provider Notification   Provider Name/Title Dr. Maddox, G3   Method of Notification Electronic Page   Request Evaluate-Remote   Notification Reason Vital Signs Change   FYI- /100. Diuril and procardia given. Will recheck in 15 minutes.

## 2021-12-19 NOTE — PLAN OF CARE
BP remains elevated but improving. Initial check was 152/100, 15 min recheck was 148/93. Procardia and hydrochlorothiazide given per orders. Repeat /83. Denies headache, vision changes, RUQ pain, and SOB. +2 edema in legs and feet. Reflexes +3, no clonus. Fundus midline, firm, and U/1. Lochia scant except for one large clot passed in bathroom, no further clots. Pain adequately managed with tylenol and ibuprofen. Voiding without difficulty. Breastfeeding with moderate assist and using formula for supplementation via SNS. Encouraged pumping after feedings. Bonding well with . Continue with plan of care.

## 2021-12-19 NOTE — PLAN OF CARE
Data: Vital signs WNL; blood pressures are mildly elevated but not severe range. Pt is edematous. +3 DTRs in bilateral LEs, no clonus noted. Postpartum checks WNL- fundus firm and midline at U/1. Perineum is healing, ice and tucks pads applied. Small amount of rubra lochia. Tubal incision is covered, dressing has old drainage circled but is otherwise intact. Patient is tolerating a regular diet. Patient is voiding and ambulating independently. Patient performing self cares and is able to care for infant. Nipples are mildly sore, lanolin cream and hydrogel pads given. Pt c/o right shoulder pain from gas and also incisional/perineal soreness. Pt managing pain with heat pack, simethicone, ibuprofen and Tylenol.  Action: Patient medicated during the shift for pain and zoloft for depression. See MAR.   Response: Patient reports pain is managed. Positive attachment behaviors observed with infant. Patient is very anxious related to concerns over breastfeeding. Pt is also very tired. This RN encouraged patient and educated patient on a variety of topics, including hands on help with breastfeeding. Encouraged FOB to be help give the patient a break to allow her to sleep. Support person is supportive and at bedside.  Plan: Continue plan of care.

## 2021-12-20 PROCEDURE — 250N000013 HC RX MED GY IP 250 OP 250 PS 637: Performed by: STUDENT IN AN ORGANIZED HEALTH CARE EDUCATION/TRAINING PROGRAM

## 2021-12-20 PROCEDURE — 120N000002 HC R&B MED SURG/OB UMMC

## 2021-12-20 PROCEDURE — 250N000013 HC RX MED GY IP 250 OP 250 PS 637

## 2021-12-20 PROCEDURE — 250N000013 HC RX MED GY IP 250 OP 250 PS 637: Performed by: OBSTETRICS & GYNECOLOGY

## 2021-12-20 RX ORDER — NIFEDIPINE 30 MG/1
90 TABLET, EXTENDED RELEASE ORAL DAILY
Status: DISCONTINUED | OUTPATIENT
Start: 2021-12-21 | End: 2021-12-21 | Stop reason: HOSPADM

## 2021-12-20 RX ORDER — NIFEDIPINE 30 MG/1
30 TABLET, EXTENDED RELEASE ORAL ONCE
Status: COMPLETED | OUTPATIENT
Start: 2021-12-20 | End: 2021-12-20

## 2021-12-20 RX ADMIN — SERTRALINE HYDROCHLORIDE 50 MG: 50 TABLET ORAL at 00:30

## 2021-12-20 RX ADMIN — DOCUSATE SODIUM 100 MG: 100 CAPSULE, LIQUID FILLED ORAL at 08:45

## 2021-12-20 RX ADMIN — SIMETHICONE 80 MG: 80 TABLET, CHEWABLE ORAL at 02:32

## 2021-12-20 RX ADMIN — ACETAMINOPHEN 650 MG: 325 TABLET, FILM COATED ORAL at 00:30

## 2021-12-20 RX ADMIN — IBUPROFEN 800 MG: 800 TABLET ORAL at 21:15

## 2021-12-20 RX ADMIN — NIFEDIPINE 60 MG: 30 TABLET, FILM COATED, EXTENDED RELEASE ORAL at 06:24

## 2021-12-20 RX ADMIN — ACETAMINOPHEN 650 MG: 325 TABLET, FILM COATED ORAL at 21:15

## 2021-12-20 RX ADMIN — IBUPROFEN 800 MG: 800 TABLET ORAL at 08:45

## 2021-12-20 RX ADMIN — SERTRALINE HYDROCHLORIDE 50 MG: 50 TABLET ORAL at 21:44

## 2021-12-20 RX ADMIN — HYDROCHLOROTHIAZIDE 25 MG: 25 TABLET ORAL at 08:45

## 2021-12-20 RX ADMIN — ACETAMINOPHEN 650 MG: 325 TABLET, FILM COATED ORAL at 09:46

## 2021-12-20 RX ADMIN — IBUPROFEN 800 MG: 800 TABLET ORAL at 14:59

## 2021-12-20 RX ADMIN — IBUPROFEN 800 MG: 800 TABLET ORAL at 00:44

## 2021-12-20 RX ADMIN — ACETAMINOPHEN 650 MG: 325 TABLET, FILM COATED ORAL at 05:12

## 2021-12-20 RX ADMIN — ACETAMINOPHEN 650 MG: 325 TABLET, FILM COATED ORAL at 14:14

## 2021-12-20 RX ADMIN — NIFEDIPINE 30 MG: 30 TABLET, FILM COATED, EXTENDED RELEASE ORAL at 15:24

## 2021-12-20 ASSESSMENT — MIFFLIN-ST. JEOR: SCORE: 1807.71

## 2021-12-20 NOTE — LACTATION NOTE
"This note was copied from a baby's chart.  Consult for first time breastfeeding mom, early term delivery infant both breastfeeding and finger feeding with formula supplements (initially started due to hunger cues in setting of high intermediate serum bilirubin).     Yue has been attempting to breastfeed but discouraged, began to cry on arrival saying it's not going well and she wants to formula feed but feels guilty for \"giving up,\" wonders if giving up too soon. Mom states this experience has been so much  Harder than she anticipated, has been frustrating with her not latching or feeding well and that she tried to pump once but felt very strange about it, somewhat \"violated\" while pumping and never wants to do that again. Amparoda tearful multiple times during discussion saying she just can't do it, didn't really want to do it but felt pressured to because that's what everyone says is best when you're pregnant and what everyone expects. She worries about backlash from others about why she's not breastfeeding if she switches to formula, her sister plans formula feeding due to breast reduction \"so everyone accepts her decision but not mine because I'm an able bodied person who should breastfeed.\" Finally she shared her feelings about formula feeding where she was skin to skin with Cristine, noted baby's relaxation and ease with finger feeding the formula and she herself felt so much relief and more relaxed. Even knowing she'd have to get up and prepare bottles in the night and clean everything, she is feeling much better about managing formula feeding than breastfeeding especially in knowing how much Cristine is getting. We discussed pros and cons of each and what Yue would like to see ideally in a month or two, fully supporting her decision in how to best feed and care for her baby.     Yue choosing to formula feed and express great relief about her decision. Reinforce her ability to feed and care for her " baby while formula feeding, she is the best comfort for and decision maker for her baby and encouraged her to feel confident in that! Taught about different formula options (ready to feed, concentrate and powder) and safe preparation and storage of each, what to expect with increasing volumes reviewing normal stomach size changes in first weeks. Ashley brought in formula feeding booklet, point out highlights to mom. Taught benefits of and how to do paced bottle feeding & demo this while mom did first bottle feeding today. Offer support and encouragement, discussed comfort and managing engorgement tips and resources to use as milk comes in and reabsorbs over next several days, encouraged self care as she recovers from delivery.

## 2021-12-20 NOTE — PROGRESS NOTES
Postpartum Progress Note  Yue Teran  [id number]    Subjective:  Patient is feeling well this morning. Ambulating without difficulty, no lightheadedness. Pain is adequately controlled. Voiding without difficulty. Erik PO without N/V.    No HA, RUQ pain, SOB, CP. Had a sustained severe range blood pressure last night. Patient very upset with recommendation to stay for 24 hours with normal to low mild range blood pressures, but understanding.    Objective:  Patient Vitals for the past 24 hrs:   BP Temp Temp src Pulse Resp Weight   12/20/21 0606 (!) 143/102 -- -- 73 18 --   12/20/21 0510 -- -- -- -- -- 105 kg (231 lb 8 oz)   12/20/21 0316 (!) 140/99 -- -- -- -- --   12/20/21 0039 -- 97.9  F (36.6  C) Oral -- 18 --   12/20/21 0030 (!) 143/100 97.9  F (36.6  C) Oral 62 18 --   12/19/21 2300 (!) 146/93 -- -- 77 -- --   12/19/21 2042 (!) 148/96 -- -- 83 -- --   12/19/21 2027 (!) 142/91 -- -- 76 -- --   12/19/21 2012 (!) 147/93 -- -- 79 -- --   12/19/21 1957 (!) 151/97 -- -- 78 -- --   12/19/21 1942 (!) 151/97 -- -- 68 -- --   12/19/21 1927 (!) 147/99 -- -- 82 -- --   12/19/21 1912 (!) 142/98 -- -- 77 -- --   12/19/21 1858 (!) 146/91 -- -- 92 -- --   12/19/21 1847 (!) 151/95 -- -- 92 -- --   12/19/21 1837 (!) 152/98 -- -- 94 -- --   12/19/21 1828 (!) 152/101 -- -- 80 -- --   12/19/21 1817 (!) 156/102 -- -- 85 -- --   12/19/21 1806 (!) 153/96 -- -- 76 -- --   12/19/21 1749 (!) 162/94 -- -- 87 -- --   12/19/21 1710 (!) 168/115 -- -- 85 -- --   12/19/21 1245 137/83 -- -- 66 -- --   12/19/21 0848 (!) 148/93 -- -- 62 -- --   12/19/21 0832 (!) 152/100 98.2  F (36.8  C) Oral 67 16 --         I/O last 3 completed shifts:  In: 2050 [P.O.:2050]  Out: 4000 [Urine:4000]    General: appropriately interactive, NAD, comfortable  Heart: regular rate, extremities warm and well-perfused  Lungs: breathing comfortably, symmetric chest rise  Abdomen: Soft, non-tender, non-distended; fundus firm and below umbilicus  Incision:  well-approximated, no erythema or drainage  Extremities: 1+ edema in BLE    Weights: 112.9 kg () > 108.5 kg () > 108kg () > 105 kg    Labs:  Hemoglobin   Date Value Ref Range Status   2021 10.2 (L) 11.7 - 15.7 g/dL Final     Hemoglobin   Date Value Ref Range Status   2021 10.2 (L) 11.7 - 15.7 g/dL Final   2021 11.7 11.7 - 15.7 g/dL Final     Assessment/Plan: 31 year old  who is PPD#3 s/p  and POD#2 from BTL.  Currently stable and doing well. Pregnancy was complicated by preeclampsia with severe features. Doing well. No s/s of worsening preeclampsia or magnesium toxicity.     Preeclampsia with SF  - BPs: Persistently high mild range, had sustained severe range yesterday at ~1800, improved with Nifedipine 10mg. Has been low to high mild since. Recommend monitoring BP until 24 hours < 150/100s. Patient is upset but agreeable. Last went up on BP medications last night, mostly mild range with 2 high mild. Will give Nifedipine 60mg  XL early, and monitor blood pressures prior to increasing regimen again.  - Medications: S/p 1D nifedipine 30mg XL > D#2 Nifedipine 60mg XL. D#2 hydrochlorothiazide.  - Symptoms: None  - Labs:  PM HELLP labs wnl   - Weights: downtrending appropriately  - UOP: 1.02 ml/kg/hr, adequate    Routine postpartum cares  - Continue routine post-partum cares.     - Heme: Hgb 11.7 >  > 10.2 > EBL 2  - Baby:  Is doing well at bedside  - Contraception: s/p BTL  - Rh positive  - Rubella immune    Dispo: Will await 24 hours of normal to mild range pressures.    Rosemarie Sin MD  Obstetrics & Gynecology, PGY-2  2021 6:25 AM

## 2021-12-20 NOTE — PLAN OF CARE
Had sustained severe range pressures requiring rapid acting medication. Due to lack of IV access, 10mg of nifedipine PO was given, which brought pressures out of severe range. MD increased procardia from 30 to 60mg/daily. IV access was re-established, but not needed. Ongoing blood pressure monitoring showed 140s/90s consistently. During one breastfeeding session, the patient declined blood pressure checks as they were interfering significantly.     She was tearful on this shift due to breastfeeding challenges paired with ongoing hypertension requiring treatment and monitoring.     Near the end of the shift, she was feeling much better, and was even able to latch her infant independently. She is feeling much more hopeful now, but it is still tenuous.     Up ad prudence with steady gait and independent with cares. Pain managed with tylenol and ibuprofen.  PIV in left forearm. Her  Red was present and incredibly supportive, but he went home to sleep, as he has a job interview in the morning.      Will continue with postpartum cares and education per plan of care.

## 2021-12-20 NOTE — PROVIDER NOTIFICATION
12/20/21 1339   Provider Notification   Provider Name/Title Dr. Hahn   Method of Notification Electronic Page   Request Evaluate-Remote   Notification Reason Vital Signs Change   AW, FYI BP's 145/101 and recheck 143/101. Know you are in the OR, just wanted to update. Thanks, Ashley [id number]

## 2021-12-20 NOTE — PROVIDER NOTIFICATION
12/19/21 1805   Provider Notification   Provider Name/Title Jean-Pierre G1   Method of Notification Electronic Page   Notification Reason Vital Signs Change  (/115, recheck 162/94)   10mg Nifedipine given now, no IV access.

## 2021-12-20 NOTE — PROVIDER NOTIFICATION
12/19/21 1841   Provider Notification   Provider Name/Title Jean-Pierre G1   Method of Notification Electronic Page   Notification Reason Status Update  (BPs now 150/100s. IV access established.)

## 2021-12-20 NOTE — PROVIDER NOTIFICATION
Paged MD PATTEN last /100. Next recheck in an hour. Denying symptoms. Thanks!    MD called back and stated we can stop frequent BPs and go up to Q4 hour checks.

## 2021-12-20 NOTE — PLAN OF CARE
Afebrile. VSS, except 0-4/10. LS clear on RA. Good PO intake, good appetite. Tubal incision site is open to air with steri strips. Reflexed +2, Clonus absent. Voiding independently with Good UOP, passing gas. Taking tylenol and IBU as needed. Bonding well with infant in room. Patient is formula feeding every 2-3 hours. Plan to continue monitoring, increasing Procardia 90mg tomorrow 12/21/21. Hourly monitoring completed, will continue to monitor.

## 2021-12-20 NOTE — PLAN OF CARE
Pt has slight elevated blood pressure of 145/99 to 145/101. Denies any headaches, vision changes or epigastric pain. Has some edema. Reflexes at +2 and no clonus. Pt was emotional this morning talking about breastfeeding experience and was tearing this afternoon when staff when back to recheck her blood pressure. Pt mention something about her emotions and wanted staff to come back. Pt has decided now to only formula feed baby and is using the bottle to feed baby. Complains of  pain at incision site. MD was paged about the slight elevated blood pressure of 145/101 and 143/10, but did not call back. Medicated pt with Tylenol and Ibuprofen for pain control. Encouraged pt to sleep while baby is sleeping. Education on formula feeding was done with pt and education book was given to pt too. Continue to monitor.

## 2021-12-20 NOTE — PLAN OF CARE
Temp: 97.9  F (36.6  C) Temp src: Oral BP: (!) 143/102 Pulse: 73   Resp: 18       PT on nifedipine 60 mg and hydrochlorothiazide. MD notified of elevated BP overnight.     PT is up independently in the room. Voiding spontaneously. Tolerating a regular diet. I&O and weight monitored. Reflexes +2 and clonus absent. PT noted mild headache overnight and continues to have edema. Reported headache went away after a nap. Denies other symptoms. MD notified during rounds of headache.   Fundus is firm and at midline. Bleeding is WDL. Steri strips to incision intact and incision appears well approximated.    PT appears to be bonding well with baby. PT's plan is to breastfeed and formula feed baby. Expresses anxiety regarding breastfeeding. Support, education, and lots of encouragement provided. Discussed principles of supply and demand. Supported with latching. PT is finger feeding formula and at times breastfeeding. Stated she felt more confident and that things were going well overnight. However,  when PT was told it was recommended to stay another day because of blood pressures she expressed feeling overwhelmed and defeated and expressed she doesn't know if she can breastfeed anymore. RN provided empathetic listening and encouraged expression of feelings. Nurse reassured PT that how to feed a baby is a very personal choice and we will support her in her decision of whatever works best for her and baby. Nurse did provide education that if she wishes to breastfeed at home, frequent stimulation of the breasts now is important to help with supply. Nurse and PT discussed PT getting more rest and to take her time with the decision, and that she doesn't need to decide now or have an all-or-nothing approach with breastfeeding. Lactation consult released for further assistance and RN reviewed lactation resources with PT.    Plan of care reviewed with PT.

## 2021-12-21 VITALS
HEIGHT: 67 IN | BODY MASS INDEX: 35.44 KG/M2 | OXYGEN SATURATION: 96 % | WEIGHT: 225.8 LBS | RESPIRATION RATE: 16 BRPM | DIASTOLIC BLOOD PRESSURE: 97 MMHG | SYSTOLIC BLOOD PRESSURE: 146 MMHG | HEART RATE: 93 BPM | TEMPERATURE: 97.9 F

## 2021-12-21 LAB
PATH REPORT.COMMENTS IMP SPEC: NORMAL
PATH REPORT.COMMENTS IMP SPEC: NORMAL
PATH REPORT.FINAL DX SPEC: NORMAL
PATH REPORT.GROSS SPEC: NORMAL
PATH REPORT.MICROSCOPIC SPEC OTHER STN: NORMAL
PATH REPORT.RELEVANT HX SPEC: NORMAL
PHOTO IMAGE: NORMAL

## 2021-12-21 PROCEDURE — 250N000013 HC RX MED GY IP 250 OP 250 PS 637

## 2021-12-21 PROCEDURE — 88302 TISSUE EXAM BY PATHOLOGIST: CPT | Mod: 26 | Performed by: PATHOLOGY

## 2021-12-21 PROCEDURE — 250N000013 HC RX MED GY IP 250 OP 250 PS 637: Performed by: STUDENT IN AN ORGANIZED HEALTH CARE EDUCATION/TRAINING PROGRAM

## 2021-12-21 PROCEDURE — 250N000013 HC RX MED GY IP 250 OP 250 PS 637: Performed by: OBSTETRICS & GYNECOLOGY

## 2021-12-21 RX ORDER — NIFEDIPINE 90 MG/1
90 TABLET, FILM COATED, EXTENDED RELEASE ORAL DAILY
Qty: 13 TABLET | Refills: 0 | Status: SHIPPED | OUTPATIENT
Start: 2021-12-22 | End: 2022-01-03

## 2021-12-21 RX ORDER — HYDROCHLOROTHIAZIDE 25 MG/1
25 TABLET ORAL DAILY
Qty: 4 TABLET | Refills: 0 | Status: SHIPPED | OUTPATIENT
Start: 2021-12-22 | End: 2022-01-07

## 2021-12-21 RX ORDER — LABETALOL 200 MG/1
200 TABLET, FILM COATED ORAL EVERY 8 HOURS
Qty: 42 TABLET | Refills: 0 | Status: SHIPPED | OUTPATIENT
Start: 2021-12-21 | End: 2022-01-03

## 2021-12-21 RX ORDER — LABETALOL 200 MG/1
200 TABLET, FILM COATED ORAL EVERY 8 HOURS SCHEDULED
Status: DISCONTINUED | OUTPATIENT
Start: 2021-12-21 | End: 2021-12-21 | Stop reason: HOSPADM

## 2021-12-21 RX ORDER — ADHESIVE BANDAGE 3/4"
1 BANDAGE TOPICAL DAILY
Qty: 1 EACH | Refills: 0 | Status: SHIPPED | OUTPATIENT
Start: 2021-12-21

## 2021-12-21 RX ADMIN — NIFEDIPINE 90 MG: 30 TABLET, FILM COATED, EXTENDED RELEASE ORAL at 08:21

## 2021-12-21 RX ADMIN — LABETALOL HYDROCHLORIDE 200 MG: 200 TABLET, FILM COATED ORAL at 14:20

## 2021-12-21 RX ADMIN — LABETALOL HYDROCHLORIDE 200 MG: 200 TABLET, FILM COATED ORAL at 06:35

## 2021-12-21 RX ADMIN — DOCUSATE SODIUM 100 MG: 100 CAPSULE, LIQUID FILLED ORAL at 11:10

## 2021-12-21 RX ADMIN — IBUPROFEN 800 MG: 800 TABLET ORAL at 14:19

## 2021-12-21 RX ADMIN — ACETAMINOPHEN 650 MG: 325 TABLET, FILM COATED ORAL at 02:37

## 2021-12-21 RX ADMIN — IBUPROFEN 800 MG: 800 TABLET ORAL at 08:21

## 2021-12-21 RX ADMIN — ACETAMINOPHEN 650 MG: 325 TABLET, FILM COATED ORAL at 06:35

## 2021-12-21 RX ADMIN — ACETAMINOPHEN 650 MG: 325 TABLET, FILM COATED ORAL at 17:52

## 2021-12-21 RX ADMIN — IBUPROFEN 800 MG: 800 TABLET ORAL at 02:37

## 2021-12-21 RX ADMIN — HYDROCHLOROTHIAZIDE 25 MG: 25 TABLET ORAL at 08:21

## 2021-12-21 ASSESSMENT — MIFFLIN-ST. JEOR: SCORE: 1781.85

## 2021-12-21 NOTE — PLAN OF CARE
-140s/80-90s throughout shift. All other VSS. Denies headache, vision changes, RUQ pain, SOB. Patient feels edema is improving in lower legs and feet. Reflexes normal, no clonus. Fundus firm, midline, and U/2. Lochia scant. Abdominal incision ROSSY with steri-strips, no drainage. Pain adequately managed with tylenol and ibuprofen. Voiding without difficulty. Independent in  cares and bonding well with . Continue with plan of care.

## 2021-12-21 NOTE — PLAN OF CARE
VSS and postpartum assessments WDL. Blood pressure elevated, but not severe range. Up ad prudence with steady gait and independent with cares.  Bonding well with infant.  Made the decision to formula feed during day shift and feels confident that was the right choice. Did have anxiety because of baby's bilirubin lights and keeping her comfortable. Baby brought to nursery during the night and patient was able to sleep. Pain managed with Tylenol and Ibuprofen.  PIV in right forearm.  Red present, but sleeping overnight.  Will continue with postpartum cares and education per plan of care.

## 2021-12-21 NOTE — PLAN OF CARE
Pt discharged to home. Instructed to take BP daily and then follow up in clinic in 1 week. All discharge instructions reviewed and copy sent home with patient along with discharge meds.

## 2021-12-21 NOTE — PROGRESS NOTES
Postpartum Progress Note  Yue Teran  [id number]    Subjective:  Patient is feeling well this morning. Ambulating without difficulty, no lightheadedness. Pain is adequately controlled. Voiding without difficulty. Erik PO without N/V.  Got a good night of sleep with nursing caring for baby.  Bottle feeding and feels good about her choice.  No HA, RUQ pain, SOB, CP.    Objective:  Patient Vitals for the past 24 hrs:   BP Temp Temp src Pulse Resp Weight   21 0640 -- -- -- -- -- 102.4 kg (225 lb 12.8 oz)   21 0415 (!) 140/92 -- -- 85 -- --   21 0352 (!) 144/101 98.3  F (36.8  C) Oral 93 18 --   21 0005 (!) 146/94 -- -- 81 -- --   21 2329 (!) 153/97 98.1  F (36.7  C) Oral 91 18 --   21 2000 (!) 137/95 98.1  F (36.7  C) Oral 82 18 --   21 1603 (!) 139/94 98.1  F (36.7  C) Oral 87 17 --   21 1330 (!) 143/101 97.7  F (36.5  C) Oral 87 17 --   21 1253 (!) 145/101 98.3  F (36.8  C) Oral 86 18 --   21 0845 (!) 145/99 97.9  F (36.6  C) Oral 85 16 --         I/O last 3 completed shifts:  In:  [P.O.:]  Out: 3750 [Urine:3750]    General: appropriately interactive, NAD, comfortable  Heart: regular rate, extremities warm and well-perfused  Lungs: breathing comfortably, symmetric chest rise  Abdomen: Soft, non-tender, non-distended; fundus firm and below umbilicus  Incision: well-approximated, no erythema or drainage  Extremities: 1+ edema in BLE    Weights: 112.9 kg () > 108.5 kg () > 108kg () > 105 kg () > 102.4 kg ()    Labs:  Hemoglobin   Date Value Ref Range Status   2021 10.2 (L) 11.7 - 15.7 g/dL Final     Hemoglobin   Date Value Ref Range Status   2021 10.2 (L) 11.7 - 15.7 g/dL Final   2021 11.7 11.7 - 15.7 g/dL Final     Assessment/Plan: 31 year old  who is PPD#4 s/p  and POD#3 from BTL.  Currently stable and doing well. Pregnancy was complicated by preeclampsia with severe features. Doing well. No s/s  of worsening preeclampsia or magnesium toxicity.     Preeclampsia with SF  - BPs: Persistently high mild range, had sustained severe range yesterday at ~1800, improved with Nifedipine 10mg. Has been low to high mild since. Added labetalol 200mg TID.  - Medications: S/p 1D nifedipine 30mg XL > 1D nifedipine 60mg XL >  D#2 Nifedipine 90mg XL. D#3 hydrochlorothiazide. D#1 labetalol 200mg TID  - Symptoms: None  - Labs: 12/19 PM HELLP labs wnl   - Weights: downtrending appropriately  - UOP: 0.6 ml/kg/hr, adequate    Routine postpartum cares  - Continue routine post-partum cares.     - Heme: Hgb 11.7 >  > 10.2 > EBL 2  - Baby:  Is doing well at bedside  - Contraception: s/p BTL  - Rh positive  - Rubella immune    Dispo: If blood pressures are controlled throughout the day,  late evening discharge.    Rosemarie Sin MD  Obstetrics & Gynecology, PGY-2  12/21/2021 6:43 AM  I saw and evaluated the patient. I agree with the   findings and the plan of care as documented in the resident's note.  MD Otoniel

## 2021-12-23 ENCOUNTER — TELEPHONE (OUTPATIENT)
Dept: OBGYN | Facility: CLINIC | Age: 31
End: 2021-12-23
Payer: COMMERCIAL

## 2021-12-23 NOTE — TELEPHONE ENCOUNTER
[facility] Call Center    Phone Message    May a detailed message be left on voicemail: yes     Reason for Call: Other: Pt called in stating that her appt today is supposed to be for next week, not this week however none of the MDs have openings. Please review and call pt back to discuss     Action Taken: Message routed to:  Other: whs    Travel Screening: Not Applicable

## 2021-12-24 NOTE — TELEPHONE ENCOUNTER
Called and spoke with patient. Confirmed appointment for nurse visit and BP check on 12/29 at 1300. Pt denies current concerns, reports she is feeling well. Encouraged her to reach out with any questions/concerns. Pt verbalized understanding and agreement.

## 2021-12-29 ENCOUNTER — TELEPHONE (OUTPATIENT)
Dept: OBGYN | Facility: CLINIC | Age: 31
End: 2021-12-29

## 2021-12-29 ENCOUNTER — ALLIED HEALTH/NURSE VISIT (OUTPATIENT)
Dept: OBGYN | Facility: CLINIC | Age: 31
End: 2021-12-29
Attending: OBSTETRICS & GYNECOLOGY
Payer: COMMERCIAL

## 2021-12-29 VITALS — BODY MASS INDEX: 35.37 KG/M2 | DIASTOLIC BLOOD PRESSURE: 86 MMHG | HEIGHT: 67 IN | SYSTOLIC BLOOD PRESSURE: 149 MMHG

## 2021-12-29 DIAGNOSIS — I10 BENIGN ESSENTIAL HYPERTENSION: Primary | ICD-10-CM

## 2021-12-29 PROCEDURE — 999N000103 HC STATISTIC NO CHARGE FACILITY FEE

## 2021-12-29 RX ORDER — HYDROCHLOROTHIAZIDE 12.5 MG/1
12.5 TABLET ORAL DAILY
Qty: 7 TABLET | Refills: 0 | Status: SHIPPED | OUTPATIENT
Start: 2021-12-29 | End: 2022-01-03

## 2021-12-29 NOTE — NURSING NOTE
Pt presents to clinic for postpartum BP check. BP in clinic 151/93, 154/87, 143/79, average 149/86. Pt states this morning at home BP was 120s/80s. States she is continuing to take labetalol 200 mg TID and nifedipine ER 90 mg daily. States she was prescribed hydrochlorothiazide 25 mg daily at discharge but was only given 4 tablets, last dose 12/25. Pt presents record of home BP readings, most 130s/80s, highest recorded reading 147/89 on 12/23. Pt denies headache, RUQ pain, reports improvement in edema, BLE edema +1 noted. Incisions healing well, no concern for infection.     Spoke with Dr. Vasquez. Reviewed BP readings and this RN's assessment. Plan to restart hydrochlorothiazide, follow up in one week for BP check/visit with Dr. Flores if available.    Reviewed recommendation with patient. Plan to call patient this afternoon to offer appointment next week. Reviewed s/s of preeclampsia and when to call clinic. Pt verbalized understanding and agreement, denied further questions/concerns.

## 2021-12-29 NOTE — TELEPHONE ENCOUNTER
Called patient and left  offering appointment with Dr. Flores 1/7/22 at 1130. Instructed patient to call back and confirm availability.

## 2021-12-29 NOTE — TELEPHONE ENCOUNTER
Message received from patient confirming appointment with Dr. Flores on 1/7/22 at 1130, states she also was able to  medication and had no questions.

## 2022-01-03 DIAGNOSIS — O09.93 HIGH-RISK PREGNANCY IN THIRD TRIMESTER: ICD-10-CM

## 2022-01-03 DIAGNOSIS — I10 BENIGN ESSENTIAL HYPERTENSION: ICD-10-CM

## 2022-01-03 RX ORDER — HYDROCHLOROTHIAZIDE 12.5 MG/1
12.5 TABLET ORAL DAILY
Qty: 5 TABLET | Refills: 0 | Status: SHIPPED | OUTPATIENT
Start: 2022-01-03

## 2022-01-03 RX ORDER — LABETALOL 200 MG/1
200 TABLET, FILM COATED ORAL EVERY 8 HOURS
Qty: 16 TABLET | Refills: 0 | Status: SHIPPED | OUTPATIENT
Start: 2022-01-03 | End: 2022-01-04

## 2022-01-03 RX ORDER — NIFEDIPINE 90 MG/1
90 TABLET, FILM COATED, EXTENDED RELEASE ORAL DAILY
Qty: 5 TABLET | Refills: 0 | Status: SHIPPED | OUTPATIENT
Start: 2022-01-03 | End: 2022-01-07

## 2022-01-03 NOTE — TELEPHONE ENCOUNTER
Pt was short HTN medication until appointment with Dr Flores by 3 days.  Filled until appointment.  Refilled sertraline per protocol.

## 2022-01-04 RX ORDER — LABETALOL 200 MG/1
200 TABLET, FILM COATED ORAL EVERY 8 HOURS
Qty: 16 TABLET | Refills: 0 | Status: SHIPPED | OUTPATIENT
Start: 2022-01-04 | End: 2022-01-07

## 2022-01-04 NOTE — TELEPHONE ENCOUNTER
[facility] was out of stock of the labetalol.  Prescription was sent to [facility] per pt request.

## 2022-01-07 ENCOUNTER — OFFICE VISIT (OUTPATIENT)
Dept: INTERNAL MEDICINE | Facility: CLINIC | Age: 32
End: 2022-01-07
Attending: INTERNAL MEDICINE
Payer: COMMERCIAL

## 2022-01-07 VITALS
HEART RATE: 87 BPM | WEIGHT: 211 LBS | DIASTOLIC BLOOD PRESSURE: 60 MMHG | BODY MASS INDEX: 33.05 KG/M2 | SYSTOLIC BLOOD PRESSURE: 92 MMHG

## 2022-01-07 PROCEDURE — 99203 OFFICE O/P NEW LOW 30 MIN: CPT | Performed by: INTERNAL MEDICINE

## 2022-01-07 PROCEDURE — G0463 HOSPITAL OUTPT CLINIC VISIT: HCPCS

## 2022-01-07 RX ORDER — NIFEDIPINE 60 MG/1
60 TABLET, EXTENDED RELEASE ORAL DAILY
Qty: 30 TABLET | Refills: 3 | Status: SHIPPED | OUTPATIENT
Start: 2022-01-07

## 2022-01-07 RX ORDER — LABETALOL 300 MG/1
300 TABLET, FILM COATED ORAL 2 TIMES DAILY
Qty: 90 TABLET | Refills: 3 | Status: SHIPPED | OUTPATIENT
Start: 2022-01-07 | End: 2022-02-03

## 2022-01-07 ASSESSMENT — PAIN SCALES - GENERAL: PAINLEVEL: NO PAIN (0)

## 2022-01-07 NOTE — PROGRESS NOTES
"  Assessment & Plan     Pre-eclampsia in postpartum period  Discussed blood pressure goals with patient.  Recommend reducing the dose of nifedipine to 60 mg.  We will consolidate the dose of labetalol to 300 mg twice a day to simplify his medical regimen.  Patient was also counseled increased risk of chronic hypertension in the future as well as increased risk for cardiovascular disease history of preeclampsia.  We will continue with tapering of medications over time.  Patient was advised that had pressure take up to 6 months to return to baseline.  - labetalol (NORMODYNE) 300 MG tablet; Take 1 tablet (300 mg) by mouth 2 times daily  - NIFEdipine ER (PROCARDIA XL) 60 MG 24 hr tablet; Take 1 tablet (60 mg) by mouth daily      I spent a total of 30 minutes on the day of the visit.   Time spent doing chart review, history and exam, documentation and further activities per the note       BMI:   Estimated body mass index is 33.05 kg/m  as calculated from the following:    Height as of 12/29/21: 1.702 m (5' 7\").    Weight as of this encounter: 95.7 kg (211 lb).           No follow-ups on file.    Margie Flores MD  [facility] [place]    Santiago Turner is a 31 year old who presents for the following health issues     HPI     Patient reports that she started to noticed lot of swelling toward the end of her pregnancy. She was seen at 37 weeks, was diagnosed with preeclampsia. She was induced for severe preeclampsia. Baby was 6 lb 13 oz. She had a tubal ligation after delivery.   She reports that she has been taking medications as prescribed. She denies medication-related side effects.     Review of Systems   Constitutional, HEENT, cardiovascular, pulmonary, GI, , musculoskeletal, neuro, skin, endocrine and psych systems are negative, except as otherwise noted.      Objective    BP 92/60   Pulse 87   Wt 95.7 kg (211 lb)   LMP 03/13/2021   Breastfeeding No   BMI 33.05 kg/m    Body " mass index is 33.05 kg/m .  Physical Exam   GENERAL: healthy, alert and no distress  EYES: Eyes grossly normal to inspection, PERRL and conjunctivae and sclerae normal  NECK: no adenopathy, no asymmetry, masses, or scars and thyroid normal to palpation  RESP: lungs clear to auscultation - no rales, rhonchi or wheezes  CV: regular rate and rhythm, normal S1 S2, no S3 or S4, no murmur, click or rub, no peripheral edema and peripheral pulses strong  MS: no gross musculoskeletal defects noted, no edema

## 2022-01-17 ENCOUNTER — MEDICAL CORRESPONDENCE (OUTPATIENT)
Dept: HEALTH INFORMATION MANAGEMENT | Facility: CLINIC | Age: 32
End: 2022-01-17
Payer: COMMERCIAL

## 2022-01-23 ENCOUNTER — TELEPHONE (OUTPATIENT)
Dept: OBGYN | Facility: CLINIC | Age: 32
End: 2022-01-23
Payer: COMMERCIAL

## 2022-01-23 DIAGNOSIS — F32.0 CURRENT MILD EPISODE OF MAJOR DEPRESSIVE DISORDER, UNSPECIFIED WHETHER RECURRENT (H): Primary | ICD-10-CM

## 2022-01-23 NOTE — TELEPHONE ENCOUNTER
Returned phone call to answering service.    Baby just had 1 month appointment. Yue had baby blues in the beginning, but feels like it has continued. Feels anxious, minimal sleep, does have support but it's not 24/7. Does have some enjoyment, but thinking meds could be adjusted to improve situation. Requests increase in zoloft dose (currently takes 25mg daily).    Had some passive SI (wishing she could disappear) but no active plans.     Hasn't seen a therapist x 1 year. [facility]greeable to [facility].     Reviewed warning signs and when to come to the ER. Has PP visit scheduled 2/3/22. Will increase zoloft now to 50mg daily and refer for therapy. Questions answered.     Shirin Hahn MD, MSCI, FACOG    [facility]

## 2022-01-24 NOTE — TELEPHONE ENCOUNTER
Pt called c/o of worsening symptoms of depression. Is on Zoloft 50 mg and wonders about increasing dose.  Plan to to increase dose to 100 mg and initiate psychotherapy.  Has PP visit within a week, will d/w Dr. Vasquez.    Lurdes Smith MD

## 2022-02-03 ENCOUNTER — OFFICE VISIT (OUTPATIENT)
Dept: OBGYN | Facility: CLINIC | Age: 32
End: 2022-02-03
Attending: OBSTETRICS & GYNECOLOGY
Payer: COMMERCIAL

## 2022-02-03 VITALS — WEIGHT: 211.2 LBS | HEIGHT: 67 IN | BODY MASS INDEX: 33.15 KG/M2

## 2022-02-03 PROCEDURE — 99207 PR POST PARTUM EXAM: CPT | Performed by: OBSTETRICS & GYNECOLOGY

## 2022-02-03 PROCEDURE — G0463 HOSPITAL OUTPT CLINIC VISIT: HCPCS

## 2022-02-03 RX ORDER — METOPROLOL SUCCINATE 100 MG/1
1 TABLET, EXTENDED RELEASE ORAL
COMMUNITY
Start: 2022-02-02

## 2022-02-03 ASSESSMENT — ANXIETY QUESTIONNAIRES
GAD7 TOTAL SCORE: 6
6. BECOMING EASILY ANNOYED OR IRRITABLE: SEVERAL DAYS
2. NOT BEING ABLE TO STOP OR CONTROL WORRYING: SEVERAL DAYS
3. WORRYING TOO MUCH ABOUT DIFFERENT THINGS: SEVERAL DAYS
5. BEING SO RESTLESS THAT IT IS HARD TO SIT STILL: NOT AT ALL
1. FEELING NERVOUS, ANXIOUS, OR ON EDGE: SEVERAL DAYS
7. FEELING AFRAID AS IF SOMETHING AWFUL MIGHT HAPPEN: SEVERAL DAYS

## 2022-02-03 ASSESSMENT — PATIENT HEALTH QUESTIONNAIRE - PHQ9
5. POOR APPETITE OR OVEREATING: SEVERAL DAYS
SUM OF ALL RESPONSES TO PHQ QUESTIONS 1-9: 9

## 2022-02-03 ASSESSMENT — MIFFLIN-ST. JEOR: SCORE: 1715.63

## 2022-02-03 NOTE — LETTER
"2/3/2022       RE: Yue Teran  [address] [place] [address]     Dear Colleague,    Thank you for referring your patient, Yue Teran, to the [facility] [place] at [facility]. Please see a copy of my visit note below.    Nursing Notes:   Julee Darden CMA  2/3/2022  2:15 PM  Sign at exiting of workspace  SUBJECTIVE:   Yue Teran is here for her 6-week postpartum checkup.     PHQ-9 score: 7  Hx of Abuse:  No    Delivery Date: 21.    Delivering provider:  Shirin Hahn MD.    Type of delivery:  .  Perineum:  tear, with repair.     Delivery complications: None  Infant gender:  girl, weight 6 pounds 13 oz.  Feeding Method:  Bottlefed.  Complications reported with feeding:  none, infant thriving .    Bleeding:  None.  Duration:  4 weeks.  Menses resumed:  No  Bowel/Urinary problems:  No    Contraception Planned:  tubal ligation  She  has had intercourse since delivery and experienced  No discomfort.  .      Patient is feeling well. Her zoloft was increased from 50 mg to 100 mg and she says this has helped with her mood significantly. She will schedule an appointment with a therapist.    She is bottle feeding with no breast pain issues.   She had a tubal ligation after delivery. She has not had vaginal bleeding in a few weeks.     For her Preeclampsia, she is seeing her primary care doctor who recently discontinued her labetalol and added metoprolol and hydrochlorothiazide.     Patient has been taking senna to regulate her BMs.     ================================================================  ROS: 10 point ROS neg other than the symptoms noted above in the HPI.     EXAM:  Ht 1.702 m (5' 7\")   Wt 95.8 kg (211 lb 3.2 oz)   LMP 2021   BMI 33.08 kg/m      General: healthy, alert and no distress  Psych: normal range of affect  Last PHQ-9 score: 7   Abdomen: Benign and No masses, organomegaly  Incision:  well healed " incision site at umbilicus  Vulva:  Normal genitalia and Bartholin's, Urethra, [place]'s normal  Vagina:  normal with good muscle tone  Cervix:  Multiparous,, no lesions and pink, moist, closed, without lesion or CMT.    Uterus:  fully involuted and non-tender      ASSESSMENT:   31 year old female  who presents for postpartum visit after    Normal postpartum exam after   Pregnancy was complicated by: preeclampsia; anxiety and depression    PLAN:  # Postpartum contraception  - Patient had tubal ligation  - Hx of PCOS. Reviewed need for menses [id number] and use of progesterone if needed.     # Preeclampsia in Postpartum Period  - medications to be managed by primary care provider    # Anxiety, Depression  - continue with Zoloft 100 mg  - patient to schedule outpatient therapy    # Constipation  - continue to take senna as need. Try to titrate down as tolerated.      Orders Placed This Encounter   Medications     metoprolol succinate ER (TOPROL-XL) 100 MG 24 hr tablet     Sig: Take 1 tablet by mouth        Follow up in 1 year or as needed.     Patient seen and staffed with Dr. Vasquez.     April Markham, MS3  [employer]    Physician Attestation   I, Lorena Vasquez, was present with the medical/[place] student who participated in the service and in the documentation of the note.  I have verified the history and personally performed the physical exam and medical decision making.  I agree with the assessment and plan of care as documented in the note.      Items personally reviewed: vitals.    Lorena Vasquez MD

## 2022-02-03 NOTE — NURSING NOTE
SUBJECTIVE:   Yue Teran is here for her 6-week postpartum checkup.     PHQ-9 score: 7  Hx of Abuse:  No    Delivery Date: 21.    Delivering provider:  Shirin Hahn MD.    Type of delivery:  .  Perineum:  tear, with repair.     Delivery complications: None  Infant gender:  girl, weight 6 pounds 13 oz.  Feeding Method:  Bottlefed.  Complications reported with feeding:  none, infant thriving .    Bleeding:  None.  Duration:  4 weeks.  Menses resumed:  No  Bowel/Urinary problems:  No    Contraception Planned:  tubal ligation  She  has had intercourse since delivery and experienced  No discomfort.  .

## 2022-02-03 NOTE — PROGRESS NOTES
"Nursing Notes:   Julee Darden, [facility]  2/3/2022  2:15 PM  Sign at exiting of workspace  SUBJECTIVE:   Yue Teran is here for her 6-week postpartum checkup.     PHQ-9 score: 7  Hx of Abuse:  No    Delivery Date: 21.    Delivering provider:  Shirin Hahn MD.    Type of delivery:  .  Perineum:  tear, with repair.     Delivery complications: None  Infant gender:  girl, weight 6 pounds 13 oz.  Feeding Method:  Bottlefed.  Complications reported with feeding:  none, infant thriving .    Bleeding:  None.  Duration:  4 weeks.  Menses resumed:  No  Bowel/Urinary problems:  No    Contraception Planned:  tubal ligation  She  has had intercourse since delivery and experienced  No discomfort.  .      Patient is feeling well. Her zoloft was increased from 50 mg to 100 mg and she says this has helped with her mood significantly. She will schedule an appointment with a therapist.    She is bottle feeding with no breast pain issues.   She had a tubal ligation after delivery. She has not had vaginal bleeding in a few weeks.     For her Preeclampsia, she is seeing her primary care doctor who recently discontinued her labetalol and added metoprolol and hydrochlorothiazide.     Patient has been taking senna to regulate her BMs.     ================================================================  ROS: 10 point ROS neg other than the symptoms noted above in the HPI.     EXAM:  Ht 1.702 m (5' 7\")   Wt 95.8 kg (211 lb 3.2 oz)   LMP 2021   BMI 33.08 kg/m      General: healthy, alert and no distress  Psych: normal range of affect  Last PHQ-9 score: 7   Abdomen: Benign and No masses, organomegaly  Incision:  well healed incision site at umbilicus  Vulva:  Normal genitalia and Bartholin's, Urethra, [place]'s normal  Vagina:  normal with good muscle tone  Cervix:  Multiparous,, no lesions and pink, moist, closed, without lesion or CMT.    Uterus:  fully involuted and non-tender      ASSESSMENT:   31 year old female  " who presents for postpartum visit after    Normal postpartum exam after   Pregnancy was complicated by: preeclampsia; anxiety and depression    PLAN:  # Postpartum contraception  - Patient had tubal ligation  - Hx of PCOS. Reviewed need for menses [id number] and use of progesterone if needed.     # Preeclampsia in Postpartum Period  - medications to be managed by primary care provider    # Anxiety, Depression  - continue with Zoloft 100 mg  - patient to schedule outpatient therapy    # Constipation  - continue to take senna as need. Try to titrate down as tolerated.      Orders Placed This Encounter   Medications     metoprolol succinate ER (TOPROL-XL) 100 MG 24 hr tablet     Sig: Take 1 tablet by mouth        Follow up in 1 year or as needed.     Patient seen and staffed with Dr. Vasquez.     April Markham, MS3  [employer]    Physician Attestation   I, Lorena Vasquez, was present with the medical/[employer] student who participated in the service and in the documentation of the note.  I have verified the history and personally performed the physical exam and medical decision making.  I agree with the assessment and plan of care as documented in the note.      Items personally reviewed: vitals.    Lorena Vasquez MD

## 2022-02-04 ASSESSMENT — ANXIETY QUESTIONNAIRES: GAD7 TOTAL SCORE: 6

## 2022-03-02 ENCOUNTER — LAB REQUISITION (OUTPATIENT)
Dept: LAB | Facility: CLINIC | Age: 32
End: 2022-03-02
Payer: COMMERCIAL

## 2022-03-02 DIAGNOSIS — I15.8 OTHER SECONDARY HYPERTENSION: ICD-10-CM

## 2022-03-02 LAB
ANION GAP SERPL CALCULATED.3IONS-SCNC: 11 MMOL/L (ref 5–18)
BUN SERPL-MCNC: 21 MG/DL (ref 8–22)
CALCIUM SERPL-MCNC: 9.9 MG/DL (ref 8.5–10.5)
CHLORIDE BLD-SCNC: 105 MMOL/L (ref 98–107)
CO2 SERPL-SCNC: 25 MMOL/L (ref 22–31)
CREAT SERPL-MCNC: 0.92 MG/DL (ref 0.6–1.1)
GFR SERPL CREATININE-BSD FRML MDRD: 86 ML/MIN/1.73M2
GLUCOSE BLD-MCNC: 99 MG/DL (ref 70–125)
POTASSIUM BLD-SCNC: 3.5 MMOL/L (ref 3.5–5)
SODIUM SERPL-SCNC: 141 MMOL/L (ref 136–145)

## 2022-03-02 PROCEDURE — 80048 BASIC METABOLIC PNL TOTAL CA: CPT | Mod: ORL | Performed by: PHYSICIAN ASSISTANT

## 2022-04-28 NOTE — PROGRESS NOTES
"Postpartum Progress Note  Yue Teran  [id number]    Subjective:  Patient is feeling much better this AM, was able to get some rest overnight.  Ambulating without difficulty, no lightheadedness. Pain is adequately controlled.  Voiding without difficulty. Her mood feels much more stable this AM. Had some shoulder and neck pain which got better with a hot pack and some anti-gas pills.     No HA, RUQ pain, SOB, CP. Is interested is discharge possibly today.     Objective:  BP (!) 148/93   Pulse 62   Temp 98.2  F (36.8  C) (Oral)   Resp 16   Ht 1.702 m (5' 7\")   Wt 108 kg (238 lb 1 oz)   LMP 2021   SpO2 96%   Breastfeeding Unknown   BMI 37.29 kg/m      I/O last 3 completed shifts:  In: 5659 [P.O.:2796; I.V.:2863]  Out: 4102 [Urine:4100; Blood:2]    General: appropriately interactive, NAD, comfortable  Heart: regular rate, extremities warm and well-perfused  Lungs: breathing comfortably, symmetric chest rise  Abdomen: Soft, non-tender, non-distended; fundus firm and below umbilicus, incision with bandage in place, dried drainage not past drawn line.   Extremities: 1+ edema in BLE    Labs:  Hemoglobin   Date Value Ref Range Status   2021 10.2 (L) 11.7 - 15.7 g/dL Final     Hemoglobin   Date Value Ref Range Status   2021 10.2 (L) 11.7 - 15.7 g/dL Final   2021 11.7 11.7 - 15.7 g/dL Final     Assessment/Plan: 31 year old  who is PPD#2 s/p  and POD#1 from BTL.  Currently stable and doing well. Pregnancy was complicated by preeclampsia with severe features. Doing well. No s/s of worsening preeclampsia or magnesium toxicity.     #Preeclampsia with SF  - BPs: Persistently high mild range  - Medications: Nifedipine 30 mg XL, will plan to start hydrochlorothiazide today  - Symptoms: None  - Labs: AM HELLP labs wnl   - Weights: daily   - UOP: >100 ml/hr, adequate  - Continue routine postpartum care    #Routine postpartum cares  - Continue routine post-partum cares.     - Heme: Hgb 11.7 " >  > 10.2 >EBL 2  - Baby:  Is doing well at bedside  - Contraception: s/p BTL  - Rh positive  - Rubella immune    Dispo: home tomorrow    Melisa Morejon, MPH, MS3  December 19, 2021, 8:03 AM  I saw and evaluated the patient. I agree with the   findings and the plan of care as documented in the resident's note.  MD Otoniel     occupational therapy

## 2022-05-02 ENCOUNTER — LAB REQUISITION (OUTPATIENT)
Dept: LAB | Facility: CLINIC | Age: 32
End: 2022-05-02
Payer: COMMERCIAL

## 2022-05-02 DIAGNOSIS — I15.8 OTHER SECONDARY HYPERTENSION: ICD-10-CM

## 2022-05-02 LAB
ALBUMIN SERPL-MCNC: 4.2 G/DL (ref 3.5–5)
ALP SERPL-CCNC: 108 U/L (ref 45–120)
ALT SERPL W P-5'-P-CCNC: 23 U/L (ref 0–45)
ANION GAP SERPL CALCULATED.3IONS-SCNC: 15 MMOL/L (ref 5–18)
AST SERPL W P-5'-P-CCNC: 16 U/L (ref 0–40)
BILIRUB SERPL-MCNC: 0.6 MG/DL (ref 0–1)
BUN SERPL-MCNC: 15 MG/DL (ref 8–22)
CALCIUM SERPL-MCNC: 9.9 MG/DL (ref 8.5–10.5)
CHLORIDE BLD-SCNC: 103 MMOL/L (ref 98–107)
CO2 SERPL-SCNC: 24 MMOL/L (ref 22–31)
CREAT SERPL-MCNC: 0.77 MG/DL (ref 0.6–1.1)
GFR SERPL CREATININE-BSD FRML MDRD: >90 ML/MIN/1.73M2
GLUCOSE BLD-MCNC: 92 MG/DL (ref 70–125)
POTASSIUM BLD-SCNC: 3.8 MMOL/L (ref 3.5–5)
PROT SERPL-MCNC: 6.9 G/DL (ref 6–8)
SODIUM SERPL-SCNC: 142 MMOL/L (ref 136–145)
T4 FREE SERPL-MCNC: 1.15 NG/DL (ref 0.7–1.8)
TSH SERPL DL<=0.005 MIU/L-ACNC: 0.02 UIU/ML (ref 0.3–5)

## 2022-05-02 PROCEDURE — 84439 ASSAY OF FREE THYROXINE: CPT | Mod: ORL | Performed by: PHYSICIAN ASSISTANT

## 2022-05-02 PROCEDURE — 84443 ASSAY THYROID STIM HORMONE: CPT | Mod: ORL | Performed by: PHYSICIAN ASSISTANT

## 2022-05-02 PROCEDURE — 80053 COMPREHEN METABOLIC PANEL: CPT | Mod: ORL | Performed by: PHYSICIAN ASSISTANT

## 2022-05-13 ENCOUNTER — LAB REQUISITION (OUTPATIENT)
Dept: LAB | Facility: CLINIC | Age: 32
End: 2022-05-13
Payer: COMMERCIAL

## 2022-05-13 DIAGNOSIS — R94.6 ABNORMAL RESULTS OF THYROID FUNCTION STUDIES: ICD-10-CM

## 2022-05-13 LAB
T3 SERPL-MCNC: 79 NG/DL (ref 60–181)
T3FREE SERPL-MCNC: 2.3 PG/ML (ref 1.6–3.9)
T4 FREE SERPL-MCNC: 0.92 NG/DL (ref 0.7–1.8)
TSH SERPL DL<=0.005 MIU/L-ACNC: 0.08 UIU/ML (ref 0.3–5)

## 2022-05-13 PROCEDURE — 84481 FREE ASSAY (FT-3): CPT | Mod: ORL | Performed by: PHYSICIAN ASSISTANT

## 2022-05-13 PROCEDURE — 84439 ASSAY OF FREE THYROXINE: CPT | Mod: ORL | Performed by: PHYSICIAN ASSISTANT

## 2022-05-13 PROCEDURE — 84480 ASSAY TRIIODOTHYRONINE (T3): CPT | Mod: ORL | Performed by: PHYSICIAN ASSISTANT

## 2022-05-13 PROCEDURE — 84443 ASSAY THYROID STIM HORMONE: CPT | Mod: ORL | Performed by: PHYSICIAN ASSISTANT

## 2022-06-29 ENCOUNTER — MEDICAL CORRESPONDENCE (OUTPATIENT)
Dept: HEALTH INFORMATION MANAGEMENT | Facility: CLINIC | Age: 32
End: 2022-06-29

## 2022-09-25 ENCOUNTER — HEALTH MAINTENANCE LETTER (OUTPATIENT)
Age: 32
End: 2022-09-25

## 2022-11-26 ENCOUNTER — LAB REQUISITION (OUTPATIENT)
Dept: LAB | Facility: CLINIC | Age: 32
End: 2022-11-26

## 2022-11-26 DIAGNOSIS — J02.9 ACUTE PHARYNGITIS, UNSPECIFIED: ICD-10-CM

## 2022-11-26 PROCEDURE — 87081 CULTURE SCREEN ONLY: CPT | Performed by: NURSE PRACTITIONER

## 2022-11-28 LAB — BACTERIA SPEC CULT: NORMAL

## 2023-02-04 ENCOUNTER — HEALTH MAINTENANCE LETTER (OUTPATIENT)
Age: 33
End: 2023-02-04

## 2023-05-01 ENCOUNTER — LAB REQUISITION (OUTPATIENT)
Dept: LAB | Facility: CLINIC | Age: 33
End: 2023-05-01

## 2023-05-01 DIAGNOSIS — E05.90 THYROTOXICOSIS, UNSPECIFIED WITHOUT THYROTOXIC CRISIS OR STORM: ICD-10-CM

## 2023-05-01 DIAGNOSIS — I10 ESSENTIAL (PRIMARY) HYPERTENSION: ICD-10-CM

## 2023-05-01 LAB
ANION GAP SERPL CALCULATED.3IONS-SCNC: 10 MMOL/L (ref 7–15)
BUN SERPL-MCNC: 16 MG/DL (ref 6–20)
CALCIUM SERPL-MCNC: 9.3 MG/DL (ref 8.6–10)
CHLORIDE SERPL-SCNC: 106 MMOL/L (ref 98–107)
CREAT SERPL-MCNC: 0.89 MG/DL (ref 0.51–0.95)
DEPRECATED HCO3 PLAS-SCNC: 25 MMOL/L (ref 22–29)
GFR SERPL CREATININE-BSD FRML MDRD: 89 ML/MIN/1.73M2
GLUCOSE SERPL-MCNC: 95 MG/DL (ref 70–99)
POTASSIUM SERPL-SCNC: 4 MMOL/L (ref 3.4–5.3)
SODIUM SERPL-SCNC: 141 MMOL/L (ref 136–145)
TSH SERPL DL<=0.005 MIU/L-ACNC: 1.76 UIU/ML (ref 0.3–4.2)

## 2023-05-01 PROCEDURE — 84443 ASSAY THYROID STIM HORMONE: CPT | Performed by: PHYSICIAN ASSISTANT

## 2023-05-01 PROCEDURE — 80048 BASIC METABOLIC PNL TOTAL CA: CPT | Performed by: PHYSICIAN ASSISTANT

## 2024-03-02 ENCOUNTER — HEALTH MAINTENANCE LETTER (OUTPATIENT)
Age: 34
End: 2024-03-02

## 2024-12-18 ENCOUNTER — LAB REQUISITION (OUTPATIENT)
Dept: LAB | Facility: CLINIC | Age: 34
End: 2024-12-18

## 2024-12-18 DIAGNOSIS — R00.1 BRADYCARDIA, UNSPECIFIED: ICD-10-CM

## 2024-12-18 DIAGNOSIS — N91.2 AMENORRHEA, UNSPECIFIED: ICD-10-CM

## 2024-12-18 DIAGNOSIS — R76.8 OTHER SPECIFIED ABNORMAL IMMUNOLOGICAL FINDINGS IN SERUM: ICD-10-CM

## 2024-12-18 PROCEDURE — 82670 ASSAY OF TOTAL ESTRADIOL: CPT | Performed by: NURSE PRACTITIONER

## 2024-12-18 PROCEDURE — 83002 ASSAY OF GONADOTROPIN (LH): CPT | Performed by: NURSE PRACTITIONER

## 2024-12-18 PROCEDURE — 84146 ASSAY OF PROLACTIN: CPT | Performed by: NURSE PRACTITIONER

## 2024-12-18 PROCEDURE — 84439 ASSAY OF FREE THYROXINE: CPT | Performed by: NURSE PRACTITIONER

## 2024-12-18 PROCEDURE — 80048 BASIC METABOLIC PNL TOTAL CA: CPT | Performed by: NURSE PRACTITIONER

## 2024-12-18 PROCEDURE — 84443 ASSAY THYROID STIM HORMONE: CPT | Performed by: NURSE PRACTITIONER

## 2024-12-18 PROCEDURE — 83001 ASSAY OF GONADOTROPIN (FSH): CPT | Performed by: NURSE PRACTITIONER

## 2024-12-19 LAB
ANION GAP SERPL CALCULATED.3IONS-SCNC: 14 MMOL/L (ref 7–15)
BUN SERPL-MCNC: 12.1 MG/DL (ref 6–20)
CALCIUM SERPL-MCNC: 9.4 MG/DL (ref 8.8–10.4)
CHLORIDE SERPL-SCNC: 104 MMOL/L (ref 98–107)
CREAT SERPL-MCNC: 0.84 MG/DL (ref 0.51–0.95)
EGFRCR SERPLBLD CKD-EPI 2021: >90 ML/MIN/1.73M2
ESTRADIOL SERPL-MCNC: 49 PG/ML
FSH SERPL IRP2-ACNC: 7.1 MIU/ML
GLUCOSE SERPL-MCNC: 112 MG/DL (ref 70–99)
HCO3 SERPL-SCNC: 24 MMOL/L (ref 22–29)
LH SERPL-ACNC: 25.2 MIU/ML
POTASSIUM SERPL-SCNC: 3.9 MMOL/L (ref 3.4–5.3)
PROLACTIN SERPL 3RD IS-MCNC: 12 NG/ML (ref 5–23)
SODIUM SERPL-SCNC: 142 MMOL/L (ref 135–145)
T4 FREE SERPL-MCNC: 1.1 NG/DL (ref 0.9–1.7)
TSH SERPL DL<=0.005 MIU/L-ACNC: 1.42 UIU/ML (ref 0.3–4.2)

## 2024-12-21 LAB — TESTOST SERPL-MCNC: 27 NG/DL (ref 8–60)

## 2025-06-18 ENCOUNTER — LAB REQUISITION (OUTPATIENT)
Dept: LAB | Facility: CLINIC | Age: 35
End: 2025-06-18

## 2025-06-18 DIAGNOSIS — Z13.220 ENCOUNTER FOR SCREENING FOR LIPOID DISORDERS: ICD-10-CM

## 2025-06-18 DIAGNOSIS — E28.2 POLYCYSTIC OVARIAN SYNDROME: ICD-10-CM

## 2025-06-18 DIAGNOSIS — E06.3 AUTOIMMUNE THYROIDITIS: ICD-10-CM

## 2025-06-18 PROCEDURE — 80061 LIPID PANEL: CPT | Performed by: STUDENT IN AN ORGANIZED HEALTH CARE EDUCATION/TRAINING PROGRAM

## 2025-06-18 PROCEDURE — 80048 BASIC METABOLIC PNL TOTAL CA: CPT | Performed by: STUDENT IN AN ORGANIZED HEALTH CARE EDUCATION/TRAINING PROGRAM

## 2025-06-18 PROCEDURE — 84443 ASSAY THYROID STIM HORMONE: CPT | Performed by: STUDENT IN AN ORGANIZED HEALTH CARE EDUCATION/TRAINING PROGRAM

## 2025-06-19 LAB
ANION GAP SERPL CALCULATED.3IONS-SCNC: 14 MMOL/L (ref 7–15)
BUN SERPL-MCNC: 13.8 MG/DL (ref 6–20)
CALCIUM SERPL-MCNC: 10 MG/DL (ref 8.8–10.4)
CHLORIDE SERPL-SCNC: 102 MMOL/L (ref 98–107)
CHOLEST SERPL-MCNC: 216 MG/DL
CREAT SERPL-MCNC: 0.97 MG/DL (ref 0.51–0.95)
EGFRCR SERPLBLD CKD-EPI 2021: 79 ML/MIN/1.73M2
FASTING STATUS PATIENT QL REPORTED: NO
FASTING STATUS PATIENT QL REPORTED: NO
GLUCOSE SERPL-MCNC: 93 MG/DL (ref 70–99)
HCO3 SERPL-SCNC: 22 MMOL/L (ref 22–29)
HDLC SERPL-MCNC: 43 MG/DL
LDLC SERPL CALC-MCNC: 147 MG/DL
NONHDLC SERPL-MCNC: 173 MG/DL
POTASSIUM SERPL-SCNC: 4.1 MMOL/L (ref 3.4–5.3)
SODIUM SERPL-SCNC: 138 MMOL/L (ref 135–145)
TRIGL SERPL-MCNC: 132 MG/DL
TSH SERPL DL<=0.005 MIU/L-ACNC: 2.47 UIU/ML (ref 0.3–4.2)

## (undated) DEVICE — LIGHT HANDLE X2

## (undated) DEVICE — GLOVE PROTEXIS BLUE W/NEU-THERA 6.0  2D73EB60

## (undated) DEVICE — GLOVE ESTEEM POWDER FREE SMT 6.0  2D72PT60

## (undated) DEVICE — SPONGE RAY-TEC 4X8" 7318

## (undated) DEVICE — DRSG MEDIPORE 3 1/2X13 3/4" 3573

## (undated) DEVICE — ESU LIGASURE OPEN SEALER/DIVIDER SM JAW 16.5MM LF1212A

## (undated) DEVICE — DRAPE POUCH IRR 1016

## (undated) DEVICE — SYR BULB IRRIG 50ML LATEX FREE 0035280

## (undated) DEVICE — SOL WATER IRRIG 1000ML BOTTLE 07139-09

## (undated) DEVICE — PREP SKIN SCRUB TRAY 4461A

## (undated) DEVICE — SU VICRYL 0 UR-6 27" J603H

## (undated) DEVICE — COVER CAMERA IN-LIGHT DISP LT-C02

## (undated) DEVICE — CATH TRAY FOLEY 16FR BARDEX W/DRAIN BAG STATLOCK 300316A

## (undated) DEVICE — DRSG STERI STRIP 1/2X4" R1547

## (undated) DEVICE — SOL NACL 0.9% IRRIG 1000ML BOTTLE 2F7124

## (undated) DEVICE — Device

## (undated) DEVICE — TUBING SUCTION MEDI-VAC 1/4"X20' N620A

## (undated) DEVICE — SUCTION TIP YANKAUER W/O VENT K86

## (undated) DEVICE — SYR 10ML FINGER CONTROL W/O NDL 309695

## (undated) DEVICE — LINEN TOWEL PACK X5 5464

## (undated) DEVICE — ESU GROUND PAD UNIVERSAL W/O CORD

## (undated) DEVICE — DRSG GAUZE 4X8" NON21842

## (undated) DEVICE — SUCTION TIP YANKAUER STR K87

## (undated) DEVICE — PREP CHLORAPREP 26ML TINTED HI-LITE ORANGE 930815

## (undated) DEVICE — SU MONOCRYL 4-0 PS-2 18" UND Y496G

## (undated) DEVICE — SU ETHILON 2 CT-2 30" D-6865

## (undated) DEVICE — NDL COUNTER 20CT 31142493

## (undated) DEVICE — LINEN GOWN X4 5410

## (undated) DEVICE — DRAPE LAP W/ARMBOARD 29410

## (undated) DEVICE — CATH TRAY FOLEY SURESTEP 16FR WDRAIN BAG STLK LATEX A300316A

## (undated) DEVICE — TUBING SUCTION 10'X3/16" N510

## (undated) DEVICE — DRAPE GYN/UROLOGY FLUID POUCH TUR 29455

## (undated) DEVICE — PREP POVIDONE IODINE USP 7.5% CLEANING SOL 64538161

## (undated) DEVICE — GLOVE PROTEXIS W/NEU-THERA 6.5  2D73TE65

## (undated) DEVICE — LUBRICATING JELLY 2.7GM T00137

## (undated) DEVICE — PREP POVIDONE IODINE USP 10% TOPICAL SOL 64537161

## (undated) DEVICE — GLOVE PROTEXIS BLUE W/NEU-THERA 7.0  2D73EB70

## (undated) DEVICE — PAD PERI INDIV WRAP 11" 2022A

## (undated) RX ORDER — SODIUM CHLORIDE, SODIUM LACTATE, POTASSIUM CHLORIDE, CALCIUM CHLORIDE 600; 310; 30; 20 MG/100ML; MG/100ML; MG/100ML; MG/100ML
INJECTION, SOLUTION INTRAVENOUS
Status: DISPENSED
Start: 2021-12-18

## (undated) RX ORDER — FUROSEMIDE 10 MG/ML
INJECTION INTRAMUSCULAR; INTRAVENOUS
Status: DISPENSED
Start: 2021-12-18

## (undated) RX ORDER — OXYCODONE HYDROCHLORIDE 5 MG/1
TABLET ORAL
Status: DISPENSED
Start: 2021-12-18

## (undated) RX ORDER — LIDOCAINE HYDROCHLORIDE 10 MG/ML
INJECTION, SOLUTION EPIDURAL; INFILTRATION; INTRACAUDAL; PERINEURAL
Status: DISPENSED
Start: 2021-12-18

## (undated) RX ORDER — HYDROMORPHONE HYDROCHLORIDE 1 MG/ML
INJECTION, SOLUTION INTRAMUSCULAR; INTRAVENOUS; SUBCUTANEOUS
Status: DISPENSED
Start: 2021-12-18

## (undated) RX ORDER — FENTANYL CITRATE 50 UG/ML
INJECTION, SOLUTION INTRAMUSCULAR; INTRAVENOUS
Status: DISPENSED
Start: 2021-12-18

## (undated) RX ORDER — ACETAMINOPHEN 325 MG/1
TABLET ORAL
Status: DISPENSED
Start: 2021-12-18

## (undated) RX ORDER — CITRIC ACID/SODIUM CITRATE 334-500MG
SOLUTION, ORAL ORAL
Status: DISPENSED
Start: 2021-12-18

## (undated) RX ORDER — ALBUTEROL SULFATE 0.83 MG/ML
SOLUTION RESPIRATORY (INHALATION)
Status: DISPENSED
Start: 2021-12-18